# Patient Record
Sex: FEMALE | Race: WHITE | NOT HISPANIC OR LATINO | Employment: OTHER | URBAN - METROPOLITAN AREA
[De-identification: names, ages, dates, MRNs, and addresses within clinical notes are randomized per-mention and may not be internally consistent; named-entity substitution may affect disease eponyms.]

---

## 2017-01-23 DIAGNOSIS — M19.90 OSTEOARTHRITIS, UNSPECIFIED OSTEOARTHRITIS TYPE, UNSPECIFIED SITE: ICD-10-CM

## 2017-01-23 DIAGNOSIS — F32.A DEPRESSION, UNSPECIFIED DEPRESSION TYPE: ICD-10-CM

## 2017-01-23 DIAGNOSIS — K21.9 GASTROESOPHAGEAL REFLUX DISEASE, ESOPHAGITIS PRESENCE NOT SPECIFIED: ICD-10-CM

## 2017-01-23 RX ORDER — OMEPRAZOLE 40 MG/1
CAPSULE, DELAYED RELEASE ORAL
Qty: 90 CAP | Refills: 0 | Status: SHIPPED | OUTPATIENT
Start: 2017-01-23 | End: 2017-07-06 | Stop reason: SDUPTHER

## 2017-01-23 RX ORDER — ETODOLAC 400 MG/1
TABLET, EXTENDED RELEASE ORAL
Qty: 270 TAB | Refills: 0 | Status: SHIPPED | OUTPATIENT
Start: 2017-01-23 | End: 2017-07-06 | Stop reason: SDUPTHER

## 2017-01-23 RX ORDER — DULOXETIN HYDROCHLORIDE 60 MG/1
60 CAPSULE, DELAYED RELEASE ORAL 2 TIMES DAILY
Qty: 180 CAP | Refills: 0 | Status: SHIPPED | OUTPATIENT
Start: 2017-01-23 | End: 2018-09-05

## 2017-01-23 NOTE — TELEPHONE ENCOUNTER
Was the patient seen in the last year in this department? Yes 08/12/2016    Does patient have an active prescription for medications requested? No     Received Request Via: Patient

## 2017-01-24 NOTE — TELEPHONE ENCOUNTER
Was the patient seen in the last year in this department? Yes     Does patient have an active prescription for medications requested? Yes     Received Request Via: Patient      Pt met protocol?: Yes    LAST OV 08/12/16

## 2017-03-14 ENCOUNTER — OFFICE VISIT (OUTPATIENT)
Dept: MEDICAL GROUP | Facility: PHYSICIAN GROUP | Age: 62
End: 2017-03-14
Payer: COMMERCIAL

## 2017-03-14 VITALS
TEMPERATURE: 98.1 F | SYSTOLIC BLOOD PRESSURE: 120 MMHG | WEIGHT: 168 LBS | HEIGHT: 63 IN | OXYGEN SATURATION: 97 % | HEART RATE: 79 BPM | DIASTOLIC BLOOD PRESSURE: 76 MMHG | BODY MASS INDEX: 29.77 KG/M2 | RESPIRATION RATE: 12 BRPM

## 2017-03-14 DIAGNOSIS — E66.3 OVERWEIGHT (BMI 25.0-29.9): ICD-10-CM

## 2017-03-14 DIAGNOSIS — J02.9 VIRAL PHARYNGITIS: Primary | ICD-10-CM

## 2017-03-14 DIAGNOSIS — E55.9 VITAMIN D DEFICIENCY: ICD-10-CM

## 2017-03-14 DIAGNOSIS — E78.2 MIXED HYPERLIPIDEMIA: ICD-10-CM

## 2017-03-14 DIAGNOSIS — K64.4 INFLAMED EXTERNAL HEMORRHOID: ICD-10-CM

## 2017-03-14 PROCEDURE — 99214 OFFICE O/P EST MOD 30 MIN: CPT | Performed by: NURSE PRACTITIONER

## 2017-03-14 RX ORDER — HYDROCORTISONE ACETATE 25 MG/1
25 SUPPOSITORY RECTAL EVERY 12 HOURS
Qty: 12 SUPPOSITORY | Refills: 0 | Status: SHIPPED | OUTPATIENT
Start: 2017-03-14 | End: 2017-08-31

## 2017-03-14 ASSESSMENT — PATIENT HEALTH QUESTIONNAIRE - PHQ9: CLINICAL INTERPRETATION OF PHQ2 SCORE: 0

## 2017-03-14 NOTE — PROGRESS NOTES
Subjective:      Shawn Westbrook is a 61 y.o. female who presents with Pharyngitis            Pharyngitis     Shawn is here today to discuss the following new problem.  She is a patient of Dr. Crane, this is her first visit with myself.    1. Viral pharyngitis  Patient reports 2-3 days of sore throat.  Denies any significant nasal congestion.  She denies fever, chills or cough.  She was concerned about the possibility of strep throat as she was recently at a neighbor's house, who had just recently been diagnosed with strep throat.  She has been taking over-the-counter cough syrup and using saline rinse with mild to moderate temporary relief.    2. Mixed hyperlipidemia  Patient states that she lost her previous lab orders and is requesting a reorder.  She needs the paper as she gets them done at [facility].  She has personal history of elevated cholesterol values, she is not taking any over-the-counter prescription medications for management.  She reports making significant changes to her diet and has been exercising consistently for the past several months.  She is optimistic her cholesterol has improved with these changes.    3. Vitamin D deficiency    4. Inflamed external hemorrhoid  Patient reports having an external hemorrhoid that is quite inflamed.  She had a bout of constipation last week lasting a couple of days.  She reports discomfort with sitting and walking.  She has not tried any over-the-counter remedies since the onset.  Denies any bleeding.    5. Overweight (BMI 25.0-29.9)    Active Ambulatory Problems     Diagnosis Date Noted   • MVP (mitral valve prolapse)    • Chronic low back pain    • Chronic neck pain 05/30/2014   • Postmenopausal symptoms 05/30/2014   • Rosacea 05/30/2014   • Heel spur 05/30/2014   • GERD (gastroesophageal reflux disease) 05/30/2014   • Allergic rhinitis 05/30/2014   • Abscess, jaw 11/26/2015   • Depression 07/05/2016   • Osteoarthritis 07/05/2016   • Mixed hyperlipidemia  "03/14/2017     Resolved Ambulatory Problems     Diagnosis Date Noted   • No Resolved Ambulatory Problems     Past Medical History   Diagnosis Date   • Pericarditis 2010       Review of Systems   HENT:        See HPI          Objective:     /76 mmHg  Pulse 79  Temp(Src) 36.7 °C (98.1 °F)  Resp 12  Ht 1.6 m (5' 2.99\")  Wt 76.204 kg (168 lb)  BMI 29.77 kg/m2  SpO2 97%     Physical Exam   Constitutional: She appears well-developed and well-nourished.   HENT:   Right Ear: Tympanic membrane is injected.   Left Ear: Tympanic membrane is injected.   Mouth/Throat: Posterior oropharyngeal erythema present. No oropharyngeal exudate or posterior oropharyngeal edema.   Eyes: Conjunctivae and EOM are normal. Pupils are equal, round, and reactive to light.   Neck: Normal range of motion. Neck supple.   Cardiovascular: Normal rate.    Pulmonary/Chest: Effort normal and breath sounds normal.   Lymphadenopathy:     She has no cervical adenopathy.   Skin: Skin is warm and dry.   Psychiatric: She has a normal mood and affect. Her behavior is normal.   Nursing note and vitals reviewed.              Assessment/Plan:     1. Viral pharyngitis     2. Mixed hyperlipidemia  LIPID PROFILE    COMP METABOLIC PANEL   3. Vitamin D deficiency  VITAMIN D,25 HYDROXY   4. Inflamed external hemorrhoid  hydrocortisone (ANUSOL-HC) 25 MG Suppos   5. Overweight (BMI 25.0-29.9)  LIPID PROFILE    COMP METABOLIC PANEL       1.  Discussed over-the-counter remedies to help symptoms while the virus runs its course.  2.  Reorder labs, orders given  3.  Trial of suppository, discussed purpose and administration.  4.  Return to clinic after labs, sooner if the above fail to improve.      "

## 2017-03-14 NOTE — MR AVS SNAPSHOT
"        Shawn Westbrook   3/14/2017 8:20 AM   Office Visit   MRN: [id number]    Department:  [facility]   Dept Phone:  [phone]    Description:  Female : 1955   Provider:  LEXUS Hobson           Reason for Visit     Pharyngitis x 3 days      Allergies as of 3/14/2017     No Known Allergies      You were diagnosed with     Viral pharyngitis   [391684]  -  Primary     Mixed hyperlipidemia   [272.2.ICD-9-CM]       Vitamin D deficiency   [0574736]       Inflamed external hemorrhoid   [151814]       Overweight (BMI 25.0-29.9)   [910016]         Vital Signs     Blood Pressure Pulse Temperature Respirations Height Weight    120/76 mmHg 79 36.7 °C (98.1 °F) 12 1.6 m (5' 2.99\") 76.204 kg (168 lb)    Body Mass Index Oxygen Saturation Smoking Status             29.77 kg/m2 97% Former Smoker         Basic Information     Date Of Birth Sex Race Ethnicity Preferred Language    1955 Female White Non- English      Problem List              ICD-10-CM Priority Class Noted - Resolved    MVP (mitral valve prolapse) I34.1   Unknown - Present    Chronic low back pain M54.5, G89.29   Unknown - Present    Chronic neck pain M54.2, G89.29   2014 - Present    Postmenopausal symptoms N95.9   2014 - Present    Rosacea L71.9   2014 - Present    Heel spur M77.30   2014 - Present    GERD (gastroesophageal reflux disease) K21.9   2014 - Present    Allergic rhinitis J30.9   2014 - Present    Abscess, jaw M27.2   2015 - Present    Depression F32.9   2016 - Present    Osteoarthritis M19.90   2016 - Present    Mixed hyperlipidemia E78.2   3/14/2017 - Present      Health Maintenance        Date Due Completion Dates    IMM DTaP/Tdap/Td Vaccine (1 - Tdap) 1973 ---    IMM ZOSTER VACCINE 2014 ---    IMM INFLUENZA (1) 2016    MAMMOGRAM 2017, 2014    PAP SMEAR 2017    COLONOSCOPY 2020 (Prv " Comp)    Override on 5/30/2010: Previously completed            Current Immunizations     Influenza TIV (IM) 9/30/2015      Below and/or attached are the medications your provider expects you to take. Review all of your home medications and newly ordered medications with your provider and/or pharmacist. Follow medication instructions as directed by your provider and/or pharmacist. Please keep your medication list with you and share with your provider. Update the information when medications are discontinued, doses are changed, or new medications (including over-the-counter products) are added; and carry medication information at all times in the event of emergency situations     Allergies:  No Known Allergies          Medications  Valid as of: March 14, 2017 -  8:50 AM    Generic Name Brand Name Tablet Size Instructions for use    Clindamycin HCl (Cap) CLEOCIN 300 MG Take 300 mg by mouth 3 times a day.        Cyclobenzaprine HCl (Tab) FLEXERIL 10 MG Take 10 mg by mouth 2 times a day as needed for Muscle Spasms.        CycloSPORINE (Emulsion) RESTASIS 0.05 % Place 1 Drop in both eyes 2 times a day.        DULoxetine HCl (Cap DR Particles) CYMBALTA 60 MG Take 1 Cap by mouth 2 times a day.        Estradiol (Tab) ESTRACE 1 MG Take 1 Tab by mouth every 48 hours.        Etodolac (TABLET SR 24 HR) LODINE  MG Take 1 tablet by mouth 3  times a day        Fluticasone Propionate (Suspension) FLONASE 50 MCG/ACT Spray 1 Spray in nose 1 time daily as needed. Indications: Hayfever        Hydrocortisone Acetate (Suppos) ANUSOL-HC 25 MG Insert 1 Suppository in rectum every 12 hours.        Loteprednol Etabonate (Suspension) Loteprednol Etabonate 0.2 % Place 1 Drop in both eyes 2 times a day as needed. Indications: Seasonal Allergic Conjunctivitis        MetroNIDAZOLE (Gel) METROGEL 0.75 % Apply 1 Application to affected area(s) 2 times a day.        Omeprazole (CAPSULE DELAYED RELEASE) PRILOSEC 40 MG Take 1 capsule by mouth   every day        PredniSONE (Tab) DELTASONE 10 MG 40mg (4 tabs) oral daily for 4 days then  20mg (2 tabs) oral daily for 4 days then  10mg (1 tab) oral daily for 4 days        Temazepam (Cap) RESTORIL 30 MG Take 1 Cap by mouth at bedtime as needed for Sleep.        .                 Medicines prescribed today were sent to:     [id number] - [place], [place] - [address]    [address] [place] [address]    Phone: [phone] Fax: [phone]    Open 24 Hours?: No    [id number] - [place], [place] - [address]    [address] [place] [address]    Phone: [phone] Fax: [phone]    Open 24 Hours?: No      Medication refill instructions:       If your prescription bottle indicates you have medication refills left, it is not necessary to call your provider’s office. Please contact your pharmacy and they will refill your medication.    If your prescription bottle indicates you do not have any refills left, you may request refills at any time through one of the following ways: The online [employer] system (except Urgent Care), by calling your provider’s office, or by asking your pharmacy to contact your provider’s office with a refill request. Medication refills are processed only during regular business hours and may not be available until the next business day. Your provider may request additional information or to have a follow-up visit with you prior to refilling your medication.   *Please Note: Medication refills are assigned a new Rx number when refilled electronically. Your pharmacy may indicate that no refills were authorized even though a new prescription for the same medication is available at the pharmacy. Please request the medicine by name with the pharmacy before contacting your provider for a refill.        Your To Do List     Future Labs/Procedures Complete By Expires    COMP METABOLIC PANEL  As directed 3/14/2018    LIPID PROFILE  As directed 3/14/2018       VITAMIN D,25 HYDROXY  As directed 3/14/2018         [employer] Access Code: Activation code not generated  Current Tink Status: Active

## 2017-04-03 ENCOUNTER — TELEPHONE (OUTPATIENT)
Dept: MEDICAL GROUP | Facility: PHYSICIAN GROUP | Age: 62
End: 2017-04-03

## 2017-04-03 DIAGNOSIS — R60.9 PERIPHERAL EDEMA: ICD-10-CM

## 2017-04-03 NOTE — TELEPHONE ENCOUNTER
1. Caller Name: Shawn Westbrook                                           Call Back Number: [phone] (home)         Patient approves a detailed voicemail message: N\A    Pt states she has discussed her leg swelling at appointments and has been asked if she wanted to try a diuretic.  She states she is ready now to try something.  She also states she will be flying in May and is asking for a note to airline stating she needs an end seat so she can get up and walk around.  Thank you

## 2017-04-05 RX ORDER — FUROSEMIDE 20 MG/1
20 TABLET ORAL
Qty: 30 TAB | Refills: 5 | Status: SHIPPED | OUTPATIENT
Start: 2017-04-05 | End: 2017-08-31

## 2017-04-11 ENCOUNTER — TELEPHONE (OUTPATIENT)
Dept: MEDICAL GROUP | Facility: PHYSICIAN GROUP | Age: 62
End: 2017-04-11

## 2017-04-11 NOTE — TELEPHONE ENCOUNTER
1. Caller Name: Shawn Westbrook                                           Call Back Number: [phone]      Patient approves a detailed voicemail message: No    Pt had a mini face lift in [place] and has appoint Mon with Siobhan for stitch removal.  She is asking since procedure was elective and she paid out of pocket will she be responsible for payment for stitch removal here.  Please only call pt on her cell phone regarding this encounter.  [phone]

## 2017-04-11 NOTE — TELEPHONE ENCOUNTER
Most likely, Yes she would be responsible for cost of stitch removal.   She could call her insurance to find out what her out of pocket costs would be.

## 2017-04-17 ENCOUNTER — OFFICE VISIT (OUTPATIENT)
Dept: MEDICAL GROUP | Facility: PHYSICIAN GROUP | Age: 62
End: 2017-04-17
Payer: COMMERCIAL

## 2017-04-17 VITALS
HEART RATE: 78 BPM | WEIGHT: 159 LBS | BODY MASS INDEX: 29.26 KG/M2 | RESPIRATION RATE: 12 BRPM | TEMPERATURE: 99.5 F | SYSTOLIC BLOOD PRESSURE: 110 MMHG | OXYGEN SATURATION: 98 % | HEIGHT: 62 IN | DIASTOLIC BLOOD PRESSURE: 80 MMHG

## 2017-04-17 DIAGNOSIS — T81.31XA DEHISCENCE OF SURGICAL WOUND, INITIAL ENCOUNTER: Primary | ICD-10-CM

## 2017-04-17 DIAGNOSIS — Z98.890 S/P COSMETIC PLASTIC SURGERY: ICD-10-CM

## 2017-04-17 PROCEDURE — 99214 OFFICE O/P EST MOD 30 MIN: CPT | Performed by: NURSE PRACTITIONER

## 2017-04-17 NOTE — MR AVS SNAPSHOT
"        Shawn Westbrook   2017 10:40 AM   Office Visit   MRN: [id number]    Department:  [facility]   Dept Phone:  [phone]    Description:  Female : 1955   Provider:  LEXUS Hobson           Reason for Visit     Suture / Staple Removal           Allergies as of 2017     No Known Allergies      Vital Signs     Blood Pressure Pulse Temperature Respirations Height Weight    110/80 mmHg 78 37.5 °C (99.5 °F) 12 1.575 m (5' 2\") 72.122 kg (159 lb)    Body Mass Index Oxygen Saturation Smoking Status             29.07 kg/m2 98% Former Smoker         Basic Information     Date Of Birth Sex Race Ethnicity Preferred Language    1955 Female White Non- English      Problem List              ICD-10-CM Priority Class Noted - Resolved    MVP (mitral valve prolapse) I34.1   Unknown - Present    Chronic low back pain M54.5, G89.29   Unknown - Present    Chronic neck pain M54.2, G89.29   2014 - Present    Postmenopausal symptoms N95.9   2014 - Present    Rosacea L71.9   2014 - Present    Heel spur M77.30   2014 - Present    GERD (gastroesophageal reflux disease) K21.9   2014 - Present    Allergic rhinitis J30.9   2014 - Present    Abscess, jaw M27.2   2015 - Present    Depression F32.9   2016 - Present    Osteoarthritis M19.90   2016 - Present    Mixed hyperlipidemia E78.2   3/14/2017 - Present      Health Maintenance        Date Due Completion Dates    IMM DTaP/Tdap/Td Vaccine (1 - Tdap) 1973 ---    IMM ZOSTER VACCINE 2014 ---    MAMMOGRAM 2017, 2014    PAP SMEAR 2017    COLONOSCOPY 2020 (Prv Comp)    Override on 2010: Previously completed            Current Immunizations     Influenza TIV (IM) 2015      Below and/or attached are the medications your provider expects you to take. Review all of your home medications and newly ordered medications with your provider " and/or pharmacist. Follow medication instructions as directed by your provider and/or pharmacist. Please keep your medication list with you and share with your provider. Update the information when medications are discontinued, doses are changed, or new medications (including over-the-counter products) are added; and carry medication information at all times in the event of emergency situations     Allergies:  No Known Allergies          Medications  Valid as of: April 17, 2017 -  3:43 PM    Generic Name Brand Name Tablet Size Instructions for use    Clindamycin HCl (Cap) CLEOCIN 300 MG Take 300 mg by mouth 3 times a day.        Cyclobenzaprine HCl (Tab) FLEXERIL 10 MG Take 10 mg by mouth 2 times a day as needed for Muscle Spasms.        CycloSPORINE (Emulsion) RESTASIS 0.05 % Place 1 Drop in both eyes 2 times a day.        DULoxetine HCl (Cap DR Particles) CYMBALTA 60 MG Take 1 Cap by mouth 2 times a day.        Estradiol (Tab) ESTRACE 1 MG Take 1 Tab by mouth every 48 hours.        Etodolac (TABLET SR 24 HR) LODINE  MG Take 1 tablet by mouth 3  times a day        Fluticasone Propionate (Suspension) FLONASE 50 MCG/ACT Spray 1 Spray in nose 1 time daily as needed. Indications: Hayfever        Furosemide (Tab) LASIX 20 MG Take 1 Tab by mouth 1 time daily as needed (leg swelling).        Hydrocortisone Acetate (Suppos) ANUSOL-HC 25 MG Insert 1 Suppository in rectum every 12 hours.        Loteprednol Etabonate (Suspension) Loteprednol Etabonate 0.2 % Place 1 Drop in both eyes 2 times a day as needed. Indications: Seasonal Allergic Conjunctivitis        MetroNIDAZOLE (Gel) METROGEL 0.75 % Apply 1 Application to affected area(s) 2 times a day.        Omeprazole (CAPSULE DELAYED RELEASE) PRILOSEC 40 MG Take 1 capsule by mouth  every day        PredniSONE (Tab) DELTASONE 10 MG 40mg (4 tabs) oral daily for 4 days then  20mg (2 tabs) oral daily for 4 days then  10mg (1 tab) oral daily for 4 days        Temazepam (Cap)  RESTORIL 30 MG Take 1 Cap by mouth at bedtime as needed for Sleep.        .                 Medicines prescribed today were sent to:     [id number] - [place], [place] - [address]    [address] [address]    Phone: [phone] Fax: [phone]    Open 24 Hours?: No    [facility] [id number]  [place], [place] - [address]    [address] [address]    Phone: [phone] Fax: [phone]    Open 24 Hours?: No      Medication refill instructions:       If your prescription bottle indicates you have medication refills left, it is not necessary to call your provider’s office. Please contact your pharmacy and they will refill your medication.    If your prescription bottle indicates you do not have any refills left, you may request refills at any time through one of the following ways: The online [employer] system (except Urgent Care), by calling your provider’s office, or by asking your pharmacy to contact your provider’s office with a refill request. Medication refills are processed only during regular business hours and may not be available until the next business day. Your provider may request additional information or to have a follow-up visit with you prior to refilling your medication.   *Please Note: Medication refills are assigned a new Rx number when refilled electronically. Your pharmacy may indicate that no refills were authorized even though a new prescription for the same medication is available at the pharmacy. Please request the medicine by name with the pharmacy before contacting your provider for a refill.           [facility] Access Code: Activation code not generated  Current [facility] Status: Active

## 2017-04-17 NOTE — PROGRESS NOTES
"Chief Complaint   Patient presents with   • Suture / Staple Removal       HISTORY OF PRESENT ILLNESS: Patient is a 61 y.o. female established patient who presents today to discuss the followin. Dehiscence of surgical wound, initial encounter  Patient is here today to have sutures removed after undergoing a facelift procedure and Sally one week ago.  She states that the surgeon felt comfortable with \"a medical professional\" removing the sutures.  She does not have an appointment to follow-up with the surgeon.  She also mentions that she was not put under anesthesia for the procedure, rather given sedative and local pain medication to perform the procedure.  Today is her first day back into town and she was told to have the sutures removed today.  She reports some swelling and tenderness on the right side of her neck, she has been applying an ice pack.  She denies any fevers or chills although she has a low-grade temperature today.  She states that the bruising is resolving.  She denies any significant pain, she is no longer using pain medication.    2. S/P cosmetic plastic surgery    Allergies:Review of patient's allergies indicates no known allergies.    Current Outpatient Prescriptions Ordered in [facility]   Medication Sig Dispense Refill   • furosemide (LASIX) 20 MG Tab Take 1 Tab by mouth 1 time daily as needed (leg swelling). 30 Tab 5   • hydrocortisone (ANUSOL-HC) 25 MG Suppos Insert 1 Suppository in rectum every 12 hours. 12 Suppository 0   • duloxetine (CYMBALTA) 60 MG Cap DR Particles delayed-release capsule Take 1 Cap by mouth 2 times a day. 180 Cap 0   • etodolac (LODINE XL) 400 MG SR tablet Take 1 tablet by mouth 3  times a day 270 Tab 0   • omeprazole (PRILOSEC) 40 MG delayed-release capsule Take 1 capsule by mouth  every day 90 Cap 0   • cyclosporin (RESTASIS) 0.05 % ophthalmic emulsion Place 1 Drop in both eyes 2 times a day.     • metronidazole (METROGEL) 0.75 % gel Apply 1 Application to " affected area(s) 2 times a day. 1 Tube 3   • clindamycin (CLEOCIN) 300 MG Cap Take 300 mg by mouth 3 times a day.     • estradiol (ESTRACE) 1 MG Tab Take 1 Tab by mouth every 48 hours. 45 Tab 3   • Loteprednol Etabonate 0.2 % Suspension Place 1 Drop in both eyes 2 times a day as needed. Indications: Seasonal Allergic Conjunctivitis     • temazepam (RESTORIL) 30 MG capsule Take 1 Cap by mouth at bedtime as needed for Sleep. 90 Cap 0   • fluticasone (FLONASE) 50 MCG/ACT nasal spray Spray 1 Spray in nose 1 time daily as needed. Indications: Hayfever 16 g 5   • predniSONE (DELTASONE) 10 MG Tab 40mg (4 tabs) oral daily for 4 days then  20mg (2 tabs) oral daily for 4 days then  10mg (1 tab) oral daily for 4 days (Patient not taking: Reported on 3/26/2016) 30 Tab 0   • cyclobenzaprine (FLEXERIL) 10 MG Tab Take 10 mg by mouth 2 times a day as needed for Muscle Spasms.       No current Epic-ordered facility-administered medications on file.       Past Medical History   Diagnosis Date   • Pericarditis    • MVP (mitral valve prolapse)    • Chronic low back pain        Social History   Substance Use Topics   • Smoking status: Former Smoker     Quit date: 1988   • Smokeless tobacco: Never Used   • Alcohol Use: 0.0 oz/week     0 Standard drinks or equivalent per week      Comment: rare       Family Status   Relation Status Death Age   • Mother  76   • Father Alive    • Sister Alive    • Sister Alive      Family History   Problem Relation Age of Onset   • Alcohol/Drug Mother    • Heart Disease Mother    • Diabetes Sister    • Diabetes Sister        ROS: see above    Review of Systems   Constitutional: Negative for fever, chills, weight loss and malaise/fatigue.   HENT: Negative for ear pain, nosebleeds, congestion, sore throat and neck pain.    Eyes: Negative for blurred vision.   Respiratory: Negative for cough, sputum production, shortness of breath and wheezing.    Cardiovascular: Negative for chest pain,  "palpitations, orthopnea and leg swelling.   Gastrointestinal: Negative for heartburn, nausea, vomiting and abdominal pain.   Genitourinary: Negative for dysuria, urgency and frequency.   Musculoskeletal: Negative for myalgias, back pain and joint pain.   Skin: Negative for rash and itching.   Neurological: Negative for dizziness, tingling, tremors, sensory change, focal weakness and headaches.   Endo/Heme/Allergies: Does not bruise/bleed easily.   Psychiatric/Behavioral: Negative for depression, suicidal ideas and memory loss.  The patient is not nervous/anxious and does not have insomnia.        Exam:  Blood pressure 110/80, pulse 78, temperature 37.5 °C (99.5 °F), resp. rate 12, height 1.575 m (5' 2\"), weight 72.122 kg (159 lb), SpO2 98 %.  General:  Well nourished, well developed female in NAD  Head is grossly normal.  Suture lines below chin, along the front and back of both ears up into the hairline.  There is right sided cervical adenopathy with tenderness and subtle warmth.  Neck: Thyroid is not enlarged.  Pulmonary: Normal effort.   Cardiovascular: Regular rate.   Extremities: no clubbing, cyanosis, or edema.  Psych:  Mood and affect are normal.  Answering questions appropriately with good eye contact.    Sutures were removed without difficulty along the right anterior ear/tragus and up into the hairline.  There is wound dehiscence on the posterior right ear, with bleeding.  No further sutures were removed to give in the wound separation and bleeding.  There was also concern in regards to possible infection as there was a right sided adenopathy with low-grade temperature.    Please note that this dictation was created using voice recognition software. I have made every reasonable attempt to correct obvious errors, but I expect that there are errors of grammar and possibly content that I did not discover before finalizing the note.    Assessment/Plan:    1. Dehiscence of surgical wound, initial encounter   " "  2. S/P cosmetic plastic surgery          1.  The left-sided sutures were not removed.  Patient was advised to go to the emergency room or go back to the surgeon's office for further evaluation as I did not feel comfortable removing any further sutures given the status of the surgical wound and possibility of infection.  Dr. Arteaga's office was contacted locally and they did not feel comfortable assessing the situation as he was not the initial surgeon.  2.  Patient returned to the nurses station after phoning the surgeon's office stating that he was not concerned about infection and would \"walk me through removing the remainder of the sutures.\"  Once again patient was advised to be seen at his office or in the ER for further evaluation as this was not appropriate to be removing further sutures with wound dehiscence and the possibility of infection.    "

## 2017-05-02 RX ORDER — ESTRADIOL 1 MG/1
TABLET ORAL
Qty: 45 TAB | Refills: 1 | Status: SHIPPED | OUTPATIENT
Start: 2017-05-02 | End: 2018-06-17 | Stop reason: SDUPTHER

## 2017-05-03 ENCOUNTER — TELEPHONE (OUTPATIENT)
Dept: MEDICAL GROUP | Facility: PHYSICIAN GROUP | Age: 62
End: 2017-05-03

## 2017-05-03 DIAGNOSIS — N64.4 BREAST PAIN, LEFT: ICD-10-CM

## 2017-05-03 NOTE — TELEPHONE ENCOUNTER
1. Caller Name: Shawn Westbrook                                           Call Back Number: [phone] (home)         Patient approves a detailed voicemail message: N\A    Pt states she needs an order for a dx mammogram.  She is having pain in left breast

## 2017-06-08 ENCOUNTER — HOSPITAL ENCOUNTER (OUTPATIENT)
Dept: RADIOLOGY | Facility: MEDICAL CENTER | Age: 62
End: 2017-06-08
Attending: FAMILY MEDICINE
Payer: COMMERCIAL

## 2017-06-08 DIAGNOSIS — Z12.31 ENCOUNTER FOR MAMMOGRAM TO ESTABLISH BASELINE MAMMOGRAM: ICD-10-CM

## 2017-06-08 PROCEDURE — G0202 SCR MAMMO BI INCL CAD: HCPCS

## 2017-07-06 ENCOUNTER — TELEPHONE (OUTPATIENT)
Dept: MEDICAL GROUP | Facility: PHYSICIAN GROUP | Age: 62
End: 2017-07-06

## 2017-07-06 DIAGNOSIS — M25.562 CHRONIC PAIN OF LEFT KNEE: ICD-10-CM

## 2017-07-06 DIAGNOSIS — G89.29 CHRONIC PAIN OF LEFT KNEE: ICD-10-CM

## 2017-07-06 RX ORDER — ETODOLAC 400 MG/1
TABLET, EXTENDED RELEASE ORAL
Qty: 270 TAB | Refills: 0 | Status: SHIPPED | OUTPATIENT
Start: 2017-07-06 | End: 2017-12-04 | Stop reason: SDUPTHER

## 2017-07-06 RX ORDER — OMEPRAZOLE 40 MG/1
CAPSULE, DELAYED RELEASE ORAL
Qty: 90 CAP | Refills: 1 | Status: SHIPPED | OUTPATIENT
Start: 2017-07-06 | End: 2017-08-31

## 2017-07-06 NOTE — TELEPHONE ENCOUNTER
1. Caller Name: Shawn Westbrook                                           Call Back Number: [phone] (home)         Patient approves a detailed voicemail message: N\A    2. SPECIFIC Action To Be Taken: Referral pending, please sign.    3. Diagnosis/Clinical Reason for Request: left knee pain (pt states she is supposed to have knee surgery)    4. Specialty & Provider Name/Lab/Imaging Location: [facility]    5. Is appointment scheduled for requested order/referral: no    Patient informed they will receive a return phone call from the office ONLY if there are any questions before processing their request. Advised to call back if they haven't received a call from the referral department in 5 days.

## 2017-08-23 ENCOUNTER — TELEPHONE (OUTPATIENT)
Dept: MEDICAL GROUP | Facility: PHYSICIAN GROUP | Age: 62
End: 2017-08-23

## 2017-08-23 NOTE — TELEPHONE ENCOUNTER
Patient has not seen Dr. Crane since 8/2016.  Last seen for wound dehiscence.  Please schedule appt with Dr. Crane for pre-operative clearance.  Thank you

## 2017-08-23 NOTE — TELEPHONE ENCOUNTER
1. Caller Name: Shawn Westbrook                                           Call Back Number: [phone] (home)         Patient approves a detailed voicemail message: N\A    Pt states she is going to have left knee replacement with Dr Cobb at [facility] and needs surgical clearance faxed to [phone]  Atten: Velma

## 2017-08-31 ENCOUNTER — OFFICE VISIT (OUTPATIENT)
Dept: MEDICAL GROUP | Facility: PHYSICIAN GROUP | Age: 62
End: 2017-08-31
Payer: COMMERCIAL

## 2017-08-31 VITALS
RESPIRATION RATE: 14 BRPM | DIASTOLIC BLOOD PRESSURE: 68 MMHG | OXYGEN SATURATION: 95 % | HEART RATE: 86 BPM | TEMPERATURE: 98.6 F | SYSTOLIC BLOOD PRESSURE: 108 MMHG | HEIGHT: 62 IN | BODY MASS INDEX: 31.83 KG/M2 | WEIGHT: 173 LBS

## 2017-08-31 DIAGNOSIS — M17.12 PRIMARY OSTEOARTHRITIS OF LEFT KNEE: ICD-10-CM

## 2017-08-31 DIAGNOSIS — I34.1 MVP (MITRAL VALVE PROLAPSE): ICD-10-CM

## 2017-08-31 DIAGNOSIS — Z23 NEED FOR TETANUS BOOSTER: ICD-10-CM

## 2017-08-31 DIAGNOSIS — Z01.818 PREOPERATIVE CLEARANCE: ICD-10-CM

## 2017-08-31 DIAGNOSIS — E78.2 MIXED HYPERLIPIDEMIA: ICD-10-CM

## 2017-08-31 PROCEDURE — 90715 TDAP VACCINE 7 YRS/> IM: CPT | Performed by: FAMILY MEDICINE

## 2017-08-31 PROCEDURE — 93000 ELECTROCARDIOGRAM COMPLETE: CPT | Performed by: FAMILY MEDICINE

## 2017-08-31 PROCEDURE — 90471 IMMUNIZATION ADMIN: CPT | Performed by: FAMILY MEDICINE

## 2017-08-31 PROCEDURE — 99214 OFFICE O/P EST MOD 30 MIN: CPT | Mod: 25 | Performed by: FAMILY MEDICINE

## 2017-08-31 RX ORDER — OMEPRAZOLE 40 MG/1
CAPSULE, DELAYED RELEASE ORAL
COMMUNITY
Start: 2017-08-21 | End: 2018-09-05

## 2017-08-31 NOTE — LETTER
August 31, 2017        RE: Shawn Westbrook      Dear Dr. Cobb,      I had the pleasure of seeing our mutual patient Shawn Westbrook.  As you probably recall she is set up to have a left total knee arthroplasty for severe OA.  She also has history of hyperlipidemia and MVP both of which are stable.  At this time there is no concern in proceeding with surgery or general anesthesia.  If you have any questions please do not hesitate to contact us.                          Sara Crane MD

## 2017-08-31 NOTE — PROGRESS NOTES
Chief Complaint   Patient presents with   • Other     surgery clearance; lft total knee replacement       HISTORY OF PRESENT ILLNESS: Patient is a 61 y.o. female new patient who presents today to discuss the following issues:    1. Primary osteoarthritis of left knee  2. Preoperative clearance  3. MVP (mitral valve prolapse)  4. Mixed hyperlipidemia    Shawn is here today for pre-operative surgical evaluation for LEFT total knee arthroplasty.  She reports that she has been in good health other than the knee pain.  She has hx of MVP and Hyperlipidemia which has been stable and asymptomatic.  Recent labs from 3/2017 has been reviewed and are acceptable.    She has no hx of MI or stroke and has never had any VTE events.  She has had the right knee replaced with typical recovery course.      Active Ambulatory Problems     Diagnosis Date Noted   • MVP (mitral valve prolapse)    • Chronic low back pain    • Chronic neck pain 05/30/2014   • Postmenopausal symptoms 05/30/2014   • Rosacea 05/30/2014   • Heel spur 05/30/2014   • GERD (gastroesophageal reflux disease) 05/30/2014   • Allergic rhinitis 05/30/2014   • Depression 07/05/2016   • Osteoarthritis 07/05/2016   • Mixed hyperlipidemia 03/14/2017     Resolved Ambulatory Problems     Diagnosis Date Noted   • Abscess, jaw 11/26/2015     Past Medical History:   Diagnosis Date   • Chronic low back pain    • MVP (mitral valve prolapse)    • Pericarditis 2010       Allergies:Review of patient's allergies indicates no known allergies.    Current Outpatient Prescriptions   Medication Sig Dispense Refill   • etodolac (LODINE XL) 400 MG SR tablet TAKE ONE TABLET BY MOUTH THREE TIMES DAILY 270 Tab 0   • duloxetine (CYMBALTA) 60 MG Cap DR Particles delayed-release capsule Take 1 Cap by mouth 2 times a day. 180 Cap 0   • temazepam (RESTORIL) 30 MG capsule Take 1 Cap by mouth at bedtime as needed for Sleep. 90 Cap 0   • fluticasone (FLONASE) 50 MCG/ACT nasal spray Spray 1 Spray in nose 1  time daily as needed. Indications: Hayfever 16 g 5   • cyclosporin (RESTASIS) 0.05 % ophthalmic emulsion Place 1 Drop in both eyes 2 times a day.     • metronidazole (METROGEL) 0.75 % gel Apply 1 Application to affected area(s) 2 times a day. 1 Tube 3   • clindamycin (CLEOCIN) 300 MG Cap Take 300 mg by mouth 3 times a day.     • cyclobenzaprine (FLEXERIL) 10 MG Tab Take 10 mg by mouth 2 times a day as needed for Muscle Spasms.     • Loteprednol Etabonate 0.2 % Suspension Place 1 Drop in both eyes 2 times a day as needed. Indications: Seasonal Allergic Conjunctivitis     • omeprazole (PRILOSEC) 40 MG delayed-release capsule      • estradiol (ESTRACE) 1 MG Tab TAKE 1 TABLET BY MOUTH EVERY 48 HOURS 45 Tab 1     No current facility-administered medications for this visit.        Social History   Substance Use Topics   • Smoking status: Former Smoker     Quit date: 1988   • Smokeless tobacco: Never Used   • Alcohol use 0.0 oz/week      Comment: rare       Family Status   Relation Status   • Mother  at age 76   • Father Alive   • Sister Alive   • Sister Alive     Family History   Problem Relation Age of Onset   • Alcohol/Drug Mother    • Heart Disease Mother    • Diabetes Sister    • Diabetes Sister      Health Maintenance Due   Topic Date Due   • IMM DTaP/Tdap/Td Vaccine (1 - Tdap) 1973   • IMM ZOSTER VACCINE  2014   • PAP SMEAR  2017   • IMM INFLUENZA (1) 2017       ROS:  Review of Systems   Constitutional: Negative for fever, chills, weight loss and malaise/fatigue.   HENT: Negative for ear pain, nosebleeds, congestion, sore throat and neck pain.    Eyes: Negative for blurred vision.   Respiratory: Negative for cough, sputum production, shortness of breath and wheezing.    Cardiovascular: Negative for chest pain, palpitations, orthopnea and leg swelling.   Gastrointestinal: Negative for heartburn, nausea, vomiting and abdominal pain.   Genitourinary: Negative for dysuria, urgency  "and frequency.   Musculoskeletal: Negative for myalgias, back pain and joint pain.   Skin: Negative for rash and itching.   Neurological: Negative for dizziness, tingling, tremors, sensory change, focal weakness and headaches.   Endo/Heme/Allergies: Does not bruise/bleed easily.   Psychiatric/Behavioral: Negative for depression, suicidal ideas and memory loss.  The patient is not nervous/anxious and does not have insomnia.      Exam:  Blood pressure 108/68, pulse 86, temperature 37 °C (98.6 °F), resp. rate 14, height 1.575 m (5' 2\"), weight 78.5 kg (173 lb), SpO2 95 %.  General:  Well nourished, well developed female in NAD  HEENT: Normocephalic and atraumatic. TMs clear bilaterally. Oropharynx is clear      without erythema and no tonsillar exudate. Nasal mucosa pink with minimal      Rhinorrhea.  EYES: EOMI.  Conjunctiva clear.    Neck: Supple without JVD or bruit. Thyroid is not enlarged.  Pulmonary: Clear to ausculation and percussion.  Normal effort. No rales, ronchi, or wheezing.  Cardiovascular: Regular rate and rhythm without murmur, no peripheral edema  Abdomen: positive bowel sounds.  Non tender, non distended, no hepatosplenomegaly.   MSK: normal ROM in upper and lower extremities bilaterally  Psych: appropriate affect and concentration, oriented to person and place  Neuro: no unilateral weakness in upper or lower extremities.  No gait disturbance    Please note that this dictation was created using voice recognition software. I have made every reasonable attempt to correct obvious errors, but I expect that there are errors of grammar and possibly content that I did not discover before finalizing the note.    Assessment/Plan:  1. Primary osteoarthritis of left knee  - EKG  Awaiting arthroplasty.   Encouraged weight reduction  - Patient identified as having weight management issue.  Appropriate orders and counseling given.    2. Preoperative clearance    - EKG  EKG Interpretation-HR is NSR, no ST/Tw " abnormalities.   - Patient identified as having weight management issue.  Appropriate orders and counseling given.    3. MVP (mitral valve prolapse)  Stable, asymptomatic     4. Mixed hyperlipidemia  Controlled.     Update Tdap today.    Return as needed post op

## 2017-12-01 DIAGNOSIS — F51.01 PRIMARY INSOMNIA: ICD-10-CM

## 2017-12-01 RX ORDER — TEMAZEPAM 30 MG/1
30 CAPSULE ORAL NIGHTLY PRN
Qty: 90 CAP | Refills: 0 | Status: SHIPPED
Start: 2017-12-01 | End: 2018-09-05

## 2017-12-01 NOTE — TELEPHONE ENCOUNTER
Was the patient seen in the last year in this department? Yes 08/31/17    Does patient have an active prescription for medications requested? No     Received Request Via: Patient

## 2017-12-06 RX ORDER — ETODOLAC 400 MG/1
TABLET, EXTENDED RELEASE ORAL
Qty: 270 TAB | Refills: 1 | Status: SHIPPED | OUTPATIENT
Start: 2017-12-06 | End: 2018-07-22 | Stop reason: SDUPTHER

## 2017-12-06 NOTE — TELEPHONE ENCOUNTER
Was the patient seen in the last year in this department? Yes     Does patient have an active prescription for medications requested? No     Received Request Via: Pharmacy      Pt met protocol?: Yes, OV 8/17

## 2018-02-06 ENCOUNTER — TELEPHONE (OUTPATIENT)
Dept: MEDICAL GROUP | Facility: PHYSICIAN GROUP | Age: 63
End: 2018-02-06

## 2018-02-06 DIAGNOSIS — L71.9 ROSACEA: ICD-10-CM

## 2018-02-06 RX ORDER — METRONIDAZOLE 7.5 MG/G
1 GEL TOPICAL 2 TIMES DAILY
Qty: 1 TUBE | Refills: 11 | Status: SHIPPED | OUTPATIENT
Start: 2018-02-06 | End: 2018-09-05

## 2018-02-06 NOTE — TELEPHONE ENCOUNTER
1. Caller Name: Shawn Westbrook                                           Call Back Number: [phone] (home)         Patient approves a detailed voicemail message: N\A    Pt states she needs refill on metronidazole 0.75% gel but states it is not working as well as it used to.  She is asking if there is a stronger strength.

## 2018-02-12 RX ORDER — OMEPRAZOLE 40 MG/1
CAPSULE, DELAYED RELEASE ORAL
Qty: 90 CAP | Refills: 1 | Status: SHIPPED | OUTPATIENT
Start: 2018-02-12 | End: 2018-09-05

## 2018-02-12 RX ORDER — OMEPRAZOLE 40 MG/1
CAPSULE, DELAYED RELEASE ORAL
Refills: 2 | OUTPATIENT
Start: 2018-02-12

## 2018-06-19 RX ORDER — ESTRADIOL 1 MG/1
TABLET ORAL
Qty: 45 TAB | Refills: 1 | Status: SHIPPED | OUTPATIENT
Start: 2018-06-19 | End: 2019-02-27 | Stop reason: SDUPTHER

## 2018-07-23 RX ORDER — ETODOLAC 400 MG/1
TABLET, EXTENDED RELEASE ORAL
Qty: 270 TAB | Refills: 0 | Status: SHIPPED | OUTPATIENT
Start: 2018-07-23 | End: 2018-11-13 | Stop reason: SDUPTHER

## 2018-07-23 NOTE — TELEPHONE ENCOUNTER
Was the patient seen in the last year in this department? Yes     Does patient have an active prescription for medications requested? No     Received Request Via: Pharmacy    Pt met protocol?: Yes     Last OV 08/2017

## 2018-09-05 ENCOUNTER — OFFICE VISIT (OUTPATIENT)
Dept: MEDICAL GROUP | Facility: PHYSICIAN GROUP | Age: 63
End: 2018-09-05
Payer: COMMERCIAL

## 2018-09-05 VITALS
WEIGHT: 162 LBS | HEIGHT: 63 IN | BODY MASS INDEX: 28.7 KG/M2 | SYSTOLIC BLOOD PRESSURE: 104 MMHG | DIASTOLIC BLOOD PRESSURE: 64 MMHG | OXYGEN SATURATION: 97 % | RESPIRATION RATE: 14 BRPM | TEMPERATURE: 97.4 F | HEART RATE: 70 BPM

## 2018-09-05 DIAGNOSIS — Z13.6 SCREENING FOR CARDIOVASCULAR CONDITION: ICD-10-CM

## 2018-09-05 DIAGNOSIS — Z01.419 WELL WOMAN EXAM WITH ROUTINE GYNECOLOGICAL EXAM: ICD-10-CM

## 2018-09-05 DIAGNOSIS — Z12.39 SCREENING FOR BREAST CANCER: ICD-10-CM

## 2018-09-05 DIAGNOSIS — Z12.4 SCREENING FOR CERVICAL CANCER: ICD-10-CM

## 2018-09-05 PROCEDURE — 99396 PREV VISIT EST AGE 40-64: CPT | Performed by: FAMILY MEDICINE

## 2018-09-05 ASSESSMENT — PATIENT HEALTH QUESTIONNAIRE - PHQ9: CLINICAL INTERPRETATION OF PHQ2 SCORE: 0

## 2018-09-05 NOTE — PROGRESS NOTES
Chief Complaint   Patient presents with   • Gynecologic Exam       HISTORY OF PRESENT ILLNESS: Patient is a 62 y.o. female est patient who presents today to discuss the following issues:    Post-menopausal female here today for annual wellness visit with routine gyne exam.   She is s/p supracervical hysterectomy with benign pathology.  She denies any  symptoms.  In addition is due for mammography and routine labs.     Active Ambulatory Problems     Diagnosis Date Noted   • MVP (mitral valve prolapse)    • Chronic low back pain    • Chronic neck pain 2014   • Postmenopausal symptoms 2014   • Rosacea 2014   • Heel spur 2014   • GERD (gastroesophageal reflux disease) 2014   • Allergic rhinitis 2014   • Depression 2016   • Osteoarthritis 2016   • Mixed hyperlipidemia 2017     Resolved Ambulatory Problems     Diagnosis Date Noted   • Abscess, jaw 2015     Past Medical History:   Diagnosis Date   • Chronic low back pain    • MVP (mitral valve prolapse)    • Pericarditis        Allergies:Patient has no known allergies.    Current Outpatient Prescriptions   Medication Sig Dispense Refill   • etodolac (LODINE XL) 400 MG SR tablet TAKE ONE TABLET BY MOUTH THREE TIMES DAILY 270 Tab 0   • estradiol (ESTRACE) 1 MG Tab TAKE ONE TABLET BY MOUTH EVERY OTHER DAY (EVERY  48  HOURS) 45 Tab 1   • Loteprednol Etabonate 0.2 % Suspension Place 1 Drop in both eyes 2 times a day as needed. Indications: Seasonal Allergic Conjunctivitis       No current facility-administered medications for this visit.        Social History   Substance Use Topics   • Smoking status: Former Smoker     Quit date: 1988   • Smokeless tobacco: Never Used   • Alcohol use 0.0 oz/week      Comment: rare       Family Status   Relation Status   • Mo  at age 76   • Fa Alive   • Sis Alive   • Sis Alive     Family History   Problem Relation Age of Onset   • Alcohol/Drug Mother    • Heart  "Disease Mother    • Diabetes Sister    • Diabetes Sister      Health Maintenance Due   Topic Date Due   • PAP SMEAR  07/01/2017   • IMM ZOSTER VACCINES (2 of 3) 11/29/2017   • MAMMOGRAM  06/08/2018   • IMM INFLUENZA (1) 09/01/2018       ROS:  Review of Systems   Constitutional: Negative for fever, chills, weight loss and malaise/fatigue.   HENT: Negative for ear pain, nosebleeds, congestion, sore throat and neck pain.    Eyes: Negative for blurred vision.   Respiratory: Negative for cough, sputum production, shortness of breath and wheezing.    Cardiovascular: Negative for chest pain, palpitations, orthopnea and leg swelling.   Gastrointestinal: Negative for heartburn, nausea, vomiting and abdominal pain.   Genitourinary: Negative for dysuria, urgency and frequency.   Musculoskeletal: Negative for myalgias, back pain and joint pain.   Skin: Negative for rash and itching.   Neurological: Negative for dizziness, tingling, tremors, sensory change, focal weakness and headaches.   Endo/Heme/Allergies: Does not bruise/bleed easily.   Psychiatric/Behavioral: Negative for depression, suicidal ideas and memory loss.  The patient is not nervous/anxious and does not have insomnia.      Exam:  Blood pressure 104/64, pulse 70, temperature 36.3 °C (97.4 °F), resp. rate 14, height 1.594 m (5' 2.75\"), weight 73.5 kg (162 lb), SpO2 97 %.  General:  Well nourished, well developed female in NAD  HEENT: Normocephalic and atraumatic. TMs clear bilaterally. Oropharynx is clear      without erythema and no tonsillar exudate. Nasal mucosa pink with minimal      Rhinorrhea.  EYES: EOMI.  Conjunctiva clear.    Neck: Supple without JVD or bruit. Thyroid is not enlarged.  Pulmonary: Clear to ausculation and percussion.  Normal effort. No rales, ronchi, or wheezing.  Cardiovascular: Regular rate and rhythm without murmur, no peripheral edema  Abdomen: positive bowel sounds.  Non tender, non distended, no hepatosplenomegaly.   MSK: normal ROM in " upper and lower extremities bilaterally  Psych: appropriate affect and concentration, oriented to person and place  Neuro: no unilateral weakness in upper or lower extremities.  No gait disturbance  Breasts: normal appearance, no skin changes, no masses, nipple discharge, or LAD  External genitalia without lesions  Vaginal mucosa pink and smooth  Minimal cervical discharge  Pap obtained      Please note that this dictation was created using voice recognition software. I have made every reasonable attempt to correct obvious errors, but I expect that there are errors of grammar and possibly content that I did not discover before finalizing the note.    Assessment/Plan:  1. Screening for breast cancer  - MA-SCREEN MAMMO W/CAD-BILAT; Future    2. Well woman exam with routine gynecological exam  - MA-SCREEN MAMMO W/CAD-BILAT; Future  - THINPREP PAP WITH HPV; Future    3. Screening for cervical cancer  - THINPREP PAP WITH HPV; Future    4. Screening for cardiovascular condition  - COMP METABOLIC PANEL; Future  - LIPID PROFILE; Future      No Follow-up on file.

## 2018-09-14 ENCOUNTER — TELEPHONE (OUTPATIENT)
Dept: MEDICAL GROUP | Facility: PHYSICIAN GROUP | Age: 63
End: 2018-09-14

## 2019-03-14 DIAGNOSIS — Z01.419 WELL WOMAN EXAM WITH ROUTINE GYNECOLOGICAL EXAM: ICD-10-CM

## 2019-03-14 DIAGNOSIS — Z12.39 SCREENING FOR BREAST CANCER: ICD-10-CM

## 2019-03-26 RX ORDER — OMEPRAZOLE 40 MG/1
CAPSULE, DELAYED RELEASE ORAL
Qty: 90 CAP | Refills: 1 | Status: SHIPPED | OUTPATIENT
Start: 2019-03-26 | End: 2020-02-06 | Stop reason: SDUPTHER

## 2019-03-26 RX ORDER — ESTRADIOL 1 MG/1
TABLET ORAL
Qty: 90 TAB | Refills: 1 | Status: SHIPPED | OUTPATIENT
Start: 2019-03-26 | End: 2020-02-06 | Stop reason: SDUPTHER

## 2019-03-26 RX ORDER — ETODOLAC 400 MG/1
TABLET, EXTENDED RELEASE ORAL
Qty: 270 TAB | Refills: 1 | Status: SHIPPED | OUTPATIENT
Start: 2019-03-26 | End: 2019-11-26 | Stop reason: SDUPTHER

## 2019-03-26 NOTE — TELEPHONE ENCOUNTER
Was the patient seen in the last year in this department? Yes    Does patient have an active prescription for medications requested? No     Received Request Via: Pharmacy      Pt met protocol?: Yes, OV 9/18, refills due next month, pt reminded to schedule appt prior to additional refills, has not done so.

## 2019-11-27 RX ORDER — ETODOLAC 400 MG/1
TABLET, EXTENDED RELEASE ORAL
Qty: 90 TAB | Refills: 0 | Status: SHIPPED | OUTPATIENT
Start: 2019-11-27 | End: 2020-02-06

## 2019-11-27 NOTE — TELEPHONE ENCOUNTER
Pt was told 10/19 to make appt and that has not been done. Please advise pt only 30 days will be sent to pharmacy and next request will be denied.

## 2020-01-09 ENCOUNTER — PATIENT MESSAGE (OUTPATIENT)
Dept: MEDICAL GROUP | Facility: PHYSICIAN GROUP | Age: 65
End: 2020-01-09

## 2020-01-09 DIAGNOSIS — M81.0 POSTMENOPAUSAL BONE LOSS: ICD-10-CM

## 2020-01-14 ENCOUNTER — TELEPHONE (OUTPATIENT)
Dept: MEDICAL GROUP | Facility: PHYSICIAN GROUP | Age: 65
End: 2020-01-14

## 2020-01-14 NOTE — TELEPHONE ENCOUNTER
Patient called and stated that she received a letter from [employer].  She said that it stated that they needed more information from us by the 14th of January.  If they did not receive it, then it would be denied.  I advised that we already had sent over the paperwork.  I asked what the dates were on the letter that [employer] sent her, she stated that she could not find the letter.  Advised that I would fax over the paperwork again.

## 2020-02-06 ENCOUNTER — OFFICE VISIT (OUTPATIENT)
Dept: MEDICAL GROUP | Facility: PHYSICIAN GROUP | Age: 65
End: 2020-02-06
Payer: MEDICARE

## 2020-02-06 VITALS
TEMPERATURE: 99.2 F | WEIGHT: 166 LBS | OXYGEN SATURATION: 96 % | SYSTOLIC BLOOD PRESSURE: 104 MMHG | HEART RATE: 68 BPM | RESPIRATION RATE: 14 BRPM | DIASTOLIC BLOOD PRESSURE: 68 MMHG | HEIGHT: 62 IN | BODY MASS INDEX: 30.55 KG/M2

## 2020-02-06 DIAGNOSIS — Z23 NEED FOR VACCINATION: ICD-10-CM

## 2020-02-06 DIAGNOSIS — M19.90 OSTEOARTHRITIS, UNSPECIFIED OSTEOARTHRITIS TYPE, UNSPECIFIED SITE: ICD-10-CM

## 2020-02-06 DIAGNOSIS — E78.5 MILD HYPERLIPIDEMIA: ICD-10-CM

## 2020-02-06 DIAGNOSIS — L71.9 ROSACEA: ICD-10-CM

## 2020-02-06 DIAGNOSIS — Z12.39 SCREENING FOR BREAST CANCER: ICD-10-CM

## 2020-02-06 DIAGNOSIS — N95.9 POSTMENOPAUSAL SYMPTOMS: ICD-10-CM

## 2020-02-06 DIAGNOSIS — H04.129 DRY EYE: ICD-10-CM

## 2020-02-06 DIAGNOSIS — Z12.31 ENCOUNTER FOR SCREENING MAMMOGRAM FOR MALIGNANT NEOPLASM OF BREAST: ICD-10-CM

## 2020-02-06 PROCEDURE — G0009 ADMIN PNEUMOCOCCAL VACCINE: HCPCS | Performed by: FAMILY MEDICINE

## 2020-02-06 PROCEDURE — 90670 PCV13 VACCINE IM: CPT | Performed by: FAMILY MEDICINE

## 2020-02-06 PROCEDURE — 99214 OFFICE O/P EST MOD 30 MIN: CPT | Mod: 25 | Performed by: FAMILY MEDICINE

## 2020-02-06 RX ORDER — MELOXICAM 7.5 MG/1
7.5-15 TABLET ORAL DAILY
Qty: 180 TAB | Refills: 3 | Status: SHIPPED | OUTPATIENT
Start: 2020-02-06 | End: 2020-06-23 | Stop reason: SINTOL

## 2020-02-06 RX ORDER — ESTRADIOL 1 MG/1
TABLET ORAL
Qty: 90 TAB | Refills: 3 | Status: SHIPPED | OUTPATIENT
Start: 2020-02-06 | End: 2022-03-07 | Stop reason: SDUPTHER

## 2020-02-06 RX ORDER — OMEPRAZOLE 40 MG/1
CAPSULE, DELAYED RELEASE ORAL
Qty: 90 CAP | Refills: 3 | Status: SHIPPED | OUTPATIENT
Start: 2020-02-06 | End: 2020-10-13

## 2020-02-06 RX ORDER — METRONIDAZOLE 7.5 MG/G
GEL TOPICAL
Qty: 1 TUBE | Refills: 1 | Status: SHIPPED | OUTPATIENT
Start: 2020-02-06 | End: 2020-08-04

## 2020-02-06 RX ORDER — METHYLPREDNISOLONE 4 MG/1
TABLET ORAL
COMMUNITY
Start: 2020-01-16 | End: 2020-05-19 | Stop reason: SDUPTHER

## 2020-02-06 RX ORDER — CYCLOSPORINE 0.5 MG/ML
1 EMULSION OPHTHALMIC 2 TIMES DAILY
Qty: 90 EACH | Refills: 3 | Status: SHIPPED | OUTPATIENT
Start: 2020-02-06 | End: 2020-02-18 | Stop reason: SDUPTHER

## 2020-02-06 ASSESSMENT — PATIENT HEALTH QUESTIONNAIRE - PHQ9
6. FEELING BAD ABOUT YOURSELF - OR THAT YOU ARE A FAILURE OR HAVE LET YOURSELF OR YOUR FAMILY DOWN: NOT AL ALL
3. TROUBLE FALLING OR STAYING ASLEEP OR SLEEPING TOO MUCH: NOT AT ALL
1. LITTLE INTEREST OR PLEASURE IN DOING THINGS: NOT AT ALL
7. TROUBLE CONCENTRATING ON THINGS, SUCH AS READING THE NEWSPAPER OR WATCHING TELEVISION: NOT AT ALL
4. FEELING TIRED OR HAVING LITTLE ENERGY: NOT AT ALL
SUM OF ALL RESPONSES TO PHQ9 QUESTIONS 1 AND 2: 0
2. FEELING DOWN, DEPRESSED, IRRITABLE, OR HOPELESS: NOT AT ALL
5. POOR APPETITE OR OVEREATING: NOT AT ALL
8. MOVING OR SPEAKING SO SLOWLY THAT OTHER PEOPLE COULD HAVE NOTICED. OR THE OPPOSITE, BEING SO FIGETY OR RESTLESS THAT YOU HAVE BEEN MOVING AROUND A LOT MORE THAN USUAL: NOT AT ALL
9. THOUGHTS THAT YOU WOULD BE BETTER OFF DEAD, OR OF HURTING YOURSELF: NOT AT ALL
SUM OF ALL RESPONSES TO PHQ QUESTIONS 1-9: 0

## 2020-02-06 NOTE — PROGRESS NOTES
Chief Complaint   Patient presents with   • Requesting Labs       HISTORY OF PRESENT ILLNESS: Patient is a 64 y.o. female established patient here today for the following concerns:     Osteoarthritis, unspecified osteoarthritis type, unspecified site  She has a history of arthritis and has been managing her pain with antiinflammatories which has been helping. She is here wanting to discuss additional NSAIDs to manage her pain.    Postmenopausal symptoms  She has recurrent symptoms of hot flashes occurring during the night while sleeping on her estradiol. She wants to discuss dosing adjustments to better control this. She is due for her bone density scan and mammogram.     Dry eye  She has a history of this and is well controlled on Restasis. She is requesting a refill of this medication.    Rosacea  She has a history of rosacea and is well controlled on metronidazole. She uses it approximately 3-4 times per day with good results.     Mild hyperlipidemia  She will continue to manage this through her own efforts. She notes that she has not been closely watching her diet and has been eating red meat.       Past Medical, Social, and Family history reviewed and updated in EPIC    Allergies:Patient has no known allergies.    Current Outpatient Medications   Medication Sig Dispense Refill   • meloxicam (MOBIC) 7.5 MG Tab Take 1-2 Tabs by mouth every day. 180 Tab 3   • estradiol (ESTRACE) 1 MG Tab TAKE 1 TABLET BY MOUTH EVERY DAY 90 Tab 3   • omeprazole (PRILOSEC) 40 MG delayed-release capsule TAKE 1 CAPSULE BY MOUTH ONCE DAILY 90 Cap 3   • cyclosporin (RESTASIS) 0.05 % ophthalmic emulsion Place 1 Drop in both eyes 2 times a day. Individua vials 90 Each 3   • metronidazole (METROGEL) 0.75 % gel Apply a thin layer topically to affected area twice daily 1 Tube 1   • Loteprednol Etabonate 0.2 % Suspension Place 1 Drop in both eyes 2 times a day as needed. Indications: Seasonal Allergic Conjunctivitis       No current  "facility-administered medications for this visit.          ROS:  Review of Systems   Constitutional: Negative for fever, chills, weight loss and malaise/fatigue.   HENT: Negative for ear pain, nosebleeds, congestion, sore throat and neck pain.    Eyes: Negative for blurred vision.   Respiratory: Negative for cough, sputum production, shortness of breath and wheezing.    Cardiovascular: Negative for chest pain, palpitations,  and leg swelling.   Gastrointestinal: Negative for heartburn, nausea, vomiting, diarrhea and abdominal pain.   Genitourinary: Negative for dysuria, urgency and frequency.   Musculoskeletal: General arthritic pain, Negative for myalgias, back pain   Skin: Negative for rash and itching.   Neurological: Negative for dizziness, tingling, tremors, sensory change, focal weakness and headaches.   Endo/Heme/Allergies: Does not bruise/bleed easily.   Psychiatric/Behavioral: Negative for depression, anxiety, suicidal ideas, insomnia and memory loss.      Exam:  /68   Pulse 68   Temp 37.3 °C (99.2 °F)   Resp 14   Ht 1.581 m (5' 2.25\")   Wt 75.3 kg (166 lb)   SpO2 96%     General:  Well nourished, well developed in NAD  Head is grossly normal.  Neck: Supple without JVD   Pulmonary: Clear to ausculation and percussion.  Normal effort. No rales, ronchi, or wheezing.  Cardiovascular: Regular rate and rhythm without murmur  Extremities: no clubbing, cyanosis, or edema.  Psych: affect appropriate    Please note that this dictation was created using voice recognition software. I have made every reasonable attempt to correct obvious errors, but I expect that there are errors of grammar and possibly content that I did not discover before finalizing the note.    Assessment/Plan:    1. Screening for breast cancer  Patient is due. Screening lab work ordered.   - MA-SCREENING MAMMO BILAT W/TOMOSYNTHESIS W/CAD; Future    2. Need for vaccination  Vaccine was administered today without adverse event.   - " Pneumococcal Conjugate Vaccine 13-Valent IM    3. Osteoarthritis, unspecified osteoarthritis type, unspecified site  Known chronic history. Patient will start on meloxicam for better pain control as requested.  - meloxicam (MOBIC) 7.5 MG Tab; Take 1-2 Tabs by mouth every day.  Dispense: 180 Tab; Refill: 3    4. Postmenopausal symptoms  Patient has new symptoms of hot flashes occurring nightly. Will adjust her dosage to prevent this.  - estradiol (ESTRACE) 1 MG Tab; TAKE 1 TABLET BY MOUTH EVERY DAY  Dispense: 90 Tab; Refill: 3    5. Rosacea  Under good control. Continue same regimen with metrogel. Refill provided.  - metronidazole (METROGEL) 0.75 % gel; Apply a thin layer topically to affected area twice daily  Dispense: 1 Tube; Refill: 1    6. Dry eye  Under good control. Continue same regimen with Restatsis. Refill provided.  - cyclosporin (RESTASIS) 0.05 % ophthalmic emulsion; Place 1 Drop in both eyes 2 times a day. Individua vials  Dispense: 90 Each; Refill: 3    7. Encounter for screening mammogram for malignant neoplasm of breast   Patient is due. Screening lab work ordered.   - MA-SCREENING MAMMO BILAT W/TOMOSYNTHESIS W/CAD; Future    8. Mild hyperlipidemia  Her lipid panel from 3/25/2019 show an elevated cholesterol at 224 and an LDL at 129. She will continue to manage this through her own efforts. I have advised the patient to increase her exercise regimen and avoid fatty foods. Labs have been ordered for the next follow up visit.     - Comp Metabolic Panel; Future  - Lipid Profile; Future       I, Jeffrey Ricci (Ketan), am scribing for, and in the presence of, Sara Crane M.D.    Electronically signed by: Jeffrey Ricci (Ketan), 2/6/2020     Sara BANUELOS M.D. personally performed the services described in this documentation, as scribed by Jeffrey Ricci in my presence, and it is both accurate and complete.

## 2020-02-18 DIAGNOSIS — H04.129 DRY EYE: ICD-10-CM

## 2020-02-18 RX ORDER — CYCLOSPORINE 0.5 MG/ML
1 EMULSION OPHTHALMIC 2 TIMES DAILY
Qty: 180 EACH | Refills: 3 | Status: SHIPPED | OUTPATIENT
Start: 2020-02-18 | End: 2022-03-07 | Stop reason: SDUPTHER

## 2020-02-18 NOTE — TELEPHONE ENCOUNTER
[id number] - [place], [place] - [address] [address]  Phone: [phone] Fax: [phone]    Pharmacy received rx for Restasis that was for 45 days.  They are requesting a 90 day rx

## 2020-03-10 DIAGNOSIS — M81.0 POSTMENOPAUSAL BONE LOSS: ICD-10-CM

## 2020-05-13 ENCOUNTER — TELEPHONE (OUTPATIENT)
Dept: MEDICAL GROUP | Facility: PHYSICIAN GROUP | Age: 65
End: 2020-05-13

## 2020-05-13 ENCOUNTER — HOSPITAL ENCOUNTER (OUTPATIENT)
Dept: LAB | Facility: MEDICAL CENTER | Age: 65
End: 2020-05-13
Attending: FAMILY MEDICINE
Payer: MEDICARE

## 2020-05-13 DIAGNOSIS — E78.5 MILD HYPERLIPIDEMIA: ICD-10-CM

## 2020-05-13 LAB
ALBUMIN SERPL BCP-MCNC: 4 G/DL (ref 3.2–4.9)
ALBUMIN/GLOB SERPL: 1.4 G/DL
ALP SERPL-CCNC: 70 U/L (ref 30–99)
ALT SERPL-CCNC: 9 U/L (ref 2–50)
ANION GAP SERPL CALC-SCNC: 7 MMOL/L (ref 7–16)
AST SERPL-CCNC: 14 U/L (ref 12–45)
BILIRUB SERPL-MCNC: 0.5 MG/DL (ref 0.1–1.5)
BUN SERPL-MCNC: 18 MG/DL (ref 8–22)
CALCIUM SERPL-MCNC: 9.5 MG/DL (ref 8.5–10.5)
CHLORIDE SERPL-SCNC: 100 MMOL/L (ref 96–112)
CHOLEST SERPL-MCNC: 234 MG/DL (ref 100–199)
CO2 SERPL-SCNC: 27 MMOL/L (ref 20–33)
CREAT SERPL-MCNC: 0.74 MG/DL (ref 0.5–1.4)
FASTING STATUS PATIENT QL REPORTED: NORMAL
GLOBULIN SER CALC-MCNC: 2.9 G/DL (ref 1.9–3.5)
GLUCOSE SERPL-MCNC: 85 MG/DL (ref 65–99)
HDLC SERPL-MCNC: 64 MG/DL
LDLC SERPL CALC-MCNC: 158 MG/DL
POTASSIUM SERPL-SCNC: 4 MMOL/L (ref 3.6–5.5)
PROT SERPL-MCNC: 6.9 G/DL (ref 6–8.2)
SODIUM SERPL-SCNC: 134 MMOL/L (ref 135–145)
TRIGL SERPL-MCNC: 62 MG/DL (ref 0–149)

## 2020-05-13 PROCEDURE — 36415 COLL VENOUS BLD VENIPUNCTURE: CPT

## 2020-05-13 PROCEDURE — 80061 LIPID PANEL: CPT

## 2020-05-13 PROCEDURE — 80053 COMPREHEN METABOLIC PANEL: CPT

## 2020-05-14 NOTE — TELEPHONE ENCOUNTER
1. Caller Name: Shawn     Call Back Number: [phone] (home)         How would the patient prefer to be contacted with a response: Phone call do NOT leave a detailed message    Informed pt of Lab results and schedule an appt for follow up

## 2020-05-14 NOTE — TELEPHONE ENCOUNTER
----- Message from Sara Crane M.D. sent at 5/13/2020  2:05 PM PDT -----  Please call patient to ensure they received their results through My Chart.  The patient requires follow up office visit.

## 2020-05-19 ENCOUNTER — TELEMEDICINE (OUTPATIENT)
Dept: MEDICAL GROUP | Facility: PHYSICIAN GROUP | Age: 65
End: 2020-05-19
Payer: MEDICARE

## 2020-05-19 VITALS
BODY MASS INDEX: 29.88 KG/M2 | WEIGHT: 162.4 LBS | HEIGHT: 62 IN | TEMPERATURE: 98 F | DIASTOLIC BLOOD PRESSURE: 73 MMHG | SYSTOLIC BLOOD PRESSURE: 101 MMHG

## 2020-05-19 DIAGNOSIS — M54.12 CERVICAL RADICULOPATHY: ICD-10-CM

## 2020-05-19 DIAGNOSIS — E78.2 MIXED HYPERLIPIDEMIA: ICD-10-CM

## 2020-05-19 DIAGNOSIS — I73.9 PERIPHERAL ARTERY DISEASE (HCC): ICD-10-CM

## 2020-05-19 PROCEDURE — 99214 OFFICE O/P EST MOD 30 MIN: CPT | Mod: 95,CR | Performed by: FAMILY MEDICINE

## 2020-05-19 RX ORDER — SIMVASTATIN 20 MG
20 TABLET ORAL EVERY EVENING
Qty: 90 TAB | Refills: 3 | Status: SHIPPED | OUTPATIENT
Start: 2020-05-19 | End: 2021-07-28 | Stop reason: SDUPTHER

## 2020-05-19 RX ORDER — METHYLPREDNISOLONE 4 MG/1
TABLET ORAL
Qty: 21 TAB | Refills: 0 | Status: SHIPPED | OUTPATIENT
Start: 2020-05-19 | End: 2020-06-04

## 2020-05-19 NOTE — PROGRESS NOTES
Chief Complaint   Patient presents with   • Hyperlipidemia     fv labs       HISTORY OF PRESENT ILLNESS: Patient is a 64 y.o. female established patient here today for the following concerns:    This encounter was conducted via Zoom .   Verbal consent was obtained. Patient's identity was verified.    1. Mixed hyperlipidemia  This is a pleasant 64-year-old female with history of persistent mild hyperlipidemia with no previous history of MI or stroke.  She has been working on diet and exercise but admits that lately she has been eating more meat and cheese than she typically would.  Denies any chest pain.    2. Peripheral artery disease (HCC)  She is very much concerned that she is noted loss of peripheral hair over her legs.  Initially thought that this might be nice however after reading an article in [employer] she is concerned that she may have some peripheral vascular disease.  She has not noted any significant claudication.  She does not have any cramping or color change in the feet.    3. Cervical radiculopathy  In addition she reports since January has been experiencing some right shoulder pain and over the last couple weeks is started to experience numbness into the arm down to the hand.  She found that when she was drinking a glass of water and extended the neck she experiences a worsening numbness.  Denies any weakness.  There is no leg weakness.  This is been disturbing her sleep        Past Medical, Social, and Family history reviewed and updated in EPIC    Allergies:Patient has no known allergies.    Current Outpatient Medications   Medication Sig Dispense Refill   • cyclosporin (RESTASIS) 0.05 % ophthalmic emulsion Place 1 Drop in both eyes 2 times a day. Individua vials 180 Each 3   • meloxicam (MOBIC) 7.5 MG Tab Take 1-2 Tabs by mouth every day. 180 Tab 3   • estradiol (ESTRACE) 1 MG Tab TAKE 1 TABLET BY MOUTH EVERY DAY 90 Tab 3   • omeprazole (PRILOSEC) 40 MG delayed-release capsule TAKE 1 CAPSULE BY MOUTH  "ONCE DAILY 90 Cap 3   • metronidazole (METROGEL) 0.75 % gel Apply a thin layer topically to affected area twice daily 1 Tube 1   • Loteprednol Etabonate 0.2 % Suspension Place 1 Drop in both eyes 2 times a day as needed. Indications: Seasonal Allergic Conjunctivitis       No current facility-administered medications for this visit.          ROS:  Review of Systems   Constitutional: Negative for fever, chills, weight loss and malaise/fatigue.   HENT: Negative for ear pain, nosebleeds, congestion, sore throat and positive neck pain.    Eyes: Negative for blurred vision.   Respiratory: Negative for cough, sputum production, shortness of breath and wheezing.    Cardiovascular: Negative for chest pain, palpitations,  and leg swelling.   Gastrointestinal: Negative for heartburn, nausea, vomiting, diarrhea and abdominal pain.   Genitourinary: Negative for dysuria, urgency and frequency.   Musculoskeletal: Negative for myalgias, back pain and joint pain.   Skin: Negative for rash and itching.   Neurological: Negative for dizziness, positive tingling, tremors, sensory change, focal weakness and headaches.   Endo/Heme/Allergies: Does not bruise/bleed easily.   Psychiatric/Behavioral: Negative for depression, anxiety, suicidal ideas, insomnia and memory loss.      Exam:  /73   Temp 36.7 °C (98 °F)   Ht 1.581 m (5' 2.25\")   Wt 73.7 kg (162 lb 6.4 oz)     General:  Well nourished, well developed in NAD  Head is grossly normal.  Neck: Supple without JVD, Trachea midline, no thyromegaly noted  Pulmonary:  Normal effort. Speaking in full sentences  Psych: affect appropriate  Skin: no Jaundice noted or facial rashes    Please note that this dictation was created using voice recognition software. I have made every reasonable attempt to correct obvious errors, but I expect that there are errors of grammar and possibly content that I did not discover before finalizing the note.    Assessment/Plan:  1. Mixed " hyperlipidemia  Start Simvastatin 20 mg daily   Recheck labs in 3months.   - Comp Metabolic Panel; Future  - Lipid Profile; Future    2. Peripheral artery disease (HCC)  Concerning for PAD, check   - US-EXTREMITY ARTERY LOWER BILAT W/EMI (COMBO); Future    3. Cervical radiculopathy  - REFERRAL TO PHYSIATRY (PMR)  - DX-CERVICAL SPINE-WITH FLEX-EXT   7+; Future  Trial of Medrol Dosepak to help with the cervical impingement    Follow-up in 3 months sooner as needed above findings

## 2020-05-20 ENCOUNTER — PATIENT MESSAGE (OUTPATIENT)
Dept: MEDICAL GROUP | Facility: PHYSICIAN GROUP | Age: 65
End: 2020-05-20

## 2020-05-25 ENCOUNTER — HOSPITAL ENCOUNTER (OUTPATIENT)
Dept: RADIOLOGY | Facility: MEDICAL CENTER | Age: 65
End: 2020-05-25
Attending: FAMILY MEDICINE
Payer: MEDICARE

## 2020-05-25 DIAGNOSIS — I73.9 PERIPHERAL ARTERY DISEASE (HCC): ICD-10-CM

## 2020-05-25 PROCEDURE — 93922 UPR/L XTREMITY ART 2 LEVELS: CPT

## 2020-05-25 PROCEDURE — 93922 UPR/L XTREMITY ART 2 LEVELS: CPT | Mod: 26 | Performed by: INTERNAL MEDICINE

## 2020-06-01 NOTE — PROGRESS NOTES
New patient note    Physiatry (physical medicine and  Rehabilitation), interventional spine and sports medicine    Date of service: See epic    Chief complaint:   Chief Complaint   Patient presents with   • New Patient     Neck pain        Referring provider: Sara Crane M.D.     HISTORY    HPI: Shawn Westbrook 64 y.o. female who presents today with Diagnoses of Cervical radiculopathy, Chronic neck pain, Right arm pain, Right arm numbness, Carpal tunnel syndrome of right wrist, and Claustrophobia were pertinent to this visit.    HPI    The patient is been having right-sided neck pain radiating down through the right arm with associated numbness.  Worse with neck movements.  Worse with neck extension.  5/10 at rest, gets up to 7/10 with neck movements.  functional deficits and she has been unable to do her home exercise program and weight training because of the pain. She has tried ice, NSAIDs, tylenol, medrol dosepak with no improvement.           Medical records review:  I reviewed the note from the referring provider Sara Crane M.D. including the note dated 5/19/2020.  X-ray ordered for cervical radiculopathy and patient was given a trial of Medrol Dosepak.  History of peripheral artery disease with ultrasound and EMI ordered.  Hyperlipidemia on simvastatin with labs ordered.      ROS:   Red Flags ROS:   Fever, Chills, Sweats: Denies  Involuntary Weight Loss: Denies  Bladder Incontinence: Denies  Bowel Incontinence: denies  Saddle Anesthesia: Denies    All other systems reviewed and negative.       PMHx:   Past Medical History:   Diagnosis Date   • Chronic low back pain    • MVP (mitral valve prolapse)    • Pericarditis 2010         Current Outpatient Medications on File Prior to Visit   Medication Sig Dispense Refill   • simvastatin (ZOCOR) 20 MG Tab Take 1 Tab by mouth every evening. 90 Tab 3   • cyclosporin (RESTASIS) 0.05 % ophthalmic emulsion Place 1 Drop in both eyes 2 times a day. Individua  vials 180 Each 3   • meloxicam (MOBIC) 7.5 MG Tab Take 1-2 Tabs by mouth every day. 180 Tab 3   • estradiol (ESTRACE) 1 MG Tab TAKE 1 TABLET BY MOUTH EVERY DAY 90 Tab 3   • omeprazole (PRILOSEC) 40 MG delayed-release capsule TAKE 1 CAPSULE BY MOUTH ONCE DAILY 90 Cap 3   • metronidazole (METROGEL) 0.75 % gel Apply a thin layer topically to affected area twice daily 1 Tube 1   • Loteprednol Etabonate 0.2 % Suspension Place 1 Drop in both eyes 2 times a day as needed. Indications: Seasonal Allergic Conjunctivitis       No current facility-administered medications on file prior to visit.         PSHx:   Past Surgical History:   Procedure Laterality Date   • ABDOMINAL HYSTERECTOMY TOTAL      cervix remains and one ovary.    • ARTHROPLASTY      knee, right   • LAMINOTOMY      L4-5, L5-S1 fusion   • OTHER ORTHOPEDIC SURGERY      spinal fusion   • OTHER ORTHOPEDIC SURGERY      right knee replacement       Family history   Family History   Problem Relation Age of Onset   • Alcohol/Drug Mother    • Heart Disease Mother    • Diabetes Sister    • Diabetes Sister          Medications: reviewed on epic.   Outpatient Medications Marked as Taking for the 6/4/20 encounter (Office Visit) with Marco Rios M.D.   Medication Sig Dispense Refill   • ALPRAZolam (XANAX) 1 MG Tab Take 1 Tab by mouth as needed for Anxiety (take one tab 1 hour prior to MRI. OK to repeat x 1. do not drive on this med) for up to 1 day. 2 Tab 0   • gabapentin (NEURONTIN) 100 MG Cap Take 1-3 Caps by mouth at bedtime as needed (pain). 90 Cap 3   • simvastatin (ZOCOR) 20 MG Tab Take 1 Tab by mouth every evening. 90 Tab 3   • cyclosporin (RESTASIS) 0.05 % ophthalmic emulsion Place 1 Drop in both eyes 2 times a day. Individua vials 180 Each 3   • meloxicam (MOBIC) 7.5 MG Tab Take 1-2 Tabs by mouth every day. 180 Tab 3   • estradiol (ESTRACE) 1 MG Tab TAKE 1 TABLET BY MOUTH EVERY DAY 90 Tab 3   • omeprazole (PRILOSEC) 40 MG delayed-release capsule TAKE 1 CAPSULE  BY MOUTH ONCE DAILY 90 Cap 3   • metronidazole (METROGEL) 0.75 % gel Apply a thin layer topically to affected area twice daily 1 Tube 1   • Loteprednol Etabonate 0.2 % Suspension Place 1 Drop in both eyes 2 times a day as needed. Indications: Seasonal Allergic Conjunctivitis          Allergies:   No Known Allergies    Social Hx:   Social History     Socioeconomic History   • Marital status:      Spouse name: Not on file   • Number of children: Not on file   • Years of education: Not on file   • Highest education level: Not on file   Occupational History     Employer: OTHER     Comment: retired   Social Needs   • Financial resource strain: Not on file   • Food insecurity     Worry: Not on file     Inability: Not on file   • Transportation needs     Medical: Not on file     Non-medical: Not on file   Tobacco Use   • Smoking status: Former Smoker     Last attempt to quit: 1988     Years since quittin.0   • Smokeless tobacco: Never Used   Substance and Sexual Activity   • Alcohol use: Yes     Alcohol/week: 0.0 oz     Comment: rare   • Drug use: No   • Sexual activity: Not Currently     Comment:    Lifestyle   • Physical activity     Days per week: Not on file     Minutes per session: Not on file   • Stress: Not on file   Relationships   • Social connections     Talks on phone: Not on file     Gets together: Not on file     Attends Islam service: Not on file     Active member of club or organization: Not on file     Attends meetings of clubs or organizations: Not on file     Relationship status: Not on file   • Intimate partner violence     Fear of current or ex partner: Not on file     Emotionally abused: Not on file     Physically abused: Not on file     Forced sexual activity: Not on file   Other Topics Concern   •  Service No   • Blood Transfusions No   • Caffeine Concern No   • Occupational Exposure No   • Hobby Hazards No   • Sleep Concern Yes     Comment: due to pain   • Stress  "Concern Yes   • Weight Concern No   • Special Diet No   • Back Care No   • Exercise Yes   • Bike Helmet No   • Seat Belt Yes   • Self-Exams Yes   Social History Narrative   • Not on file         EXAMINATION     Physical Exam:   Vitals: /72 (BP Location: Left arm, Patient Position: Sitting, BP Cuff Size: Adult)   Pulse 77   Temp 37.5 °C (99.5 °F) (Temporal)   Ht 1.6 m (5' 3\")   Wt 75.3 kg (166 lb 0.1 oz)   SpO2 96%     Constitutional:   Body Habitus: Body mass index is 29.41 kg/m².  Cooperation: Fully cooperates with exam  Appearance: Well-groomed, well-nourished, not disheveled     Eyes: No scleral icterus to suggest severe liver disease, no proptosis to suggest severe hyperthyroid    ENT -no obvious auditory deficits, no obvious tongue lesions, tongue midline, no facial droop     Skin -no rashes or lesions noted     Respiratory-  breathing comfortable on room air, no audible wheezing    Cardiovascular- capillary refills less than 2 seconds. No lower extremity edema is noted.     Gastrointestinal - no obvious abdominal masses, No tenderness to palpation in the abdomen    Psychiatric- alert and oriented ×3. Normal affect.     Gait - normal gait, no use of ambulatory device, nonantalgic.     Musculoskeletal and Neuro -     Bilateral hands:   Inspection: No swelling,  Deformities or rashes. Symmetric appearing thenar and hyperthenar regions bilaterally.  Palpation no significant tenderness to palpation throughout the bilateral hands  Range of motion is within normal limits throughout bilateral hands, fingers and wrist.  Special tests:  Carpal tunnel compression: Negative bilaterally  Phalen's test: Negative bilaterally  Finkelstein's test: Negative bilaterally     right Shoulder  Inspection: No rashes are noted on the bilateral shoulders. Humerus is not grossly high riding when comparing the right and left sides.  Palpation: No tenderness to palpation over the biceps tendon, acromioclavicular joint, scapular " spine, supraspinous, infraspinatus, greater tuberosity, lesser tuberosity, trapezius.  Range of motion: Active range of motion forward ark and lateral arc within normal limits. The patient is able to lower his arm slowly with no drop arm.  Special tests:  Neers: negative  Urbina: negative  Speeds: Negative  Jürgensen signs: Negative  Empty can: negative  O'Briens test: Negative  Crossarm test: Negative  Resisted internal rotation: Negative  Resisted external rotation: neagtive  Resisted elbow extension: Negative       Cervical spine   Inspection: No deformities of the skin over the cervical spine. No rashes or lesions.    decreased  active range of motion in all directions, with  pain      Spurling’s sign: positive right, negative left    No signs of muscular atrophy in bilateral upper extremities     Positive for tenderness to palpation on the RIGHT side in the cervical facets.       Key points for the international standards for neurological classification of spinal cord injury (ISNCSCI) to light touch.     Dermatome R L   C4 2 2   C5 2 2   C6 1 2   C7 1 2   C8 2 2   T1 2 2   T2 2 2                                     Motor Exam Upper Extremities   ? Myotome R L   Shoulder flexion C5 5 5   Elbow flexion C5 5 5   Wrist extension C6 5 5   Elbow extension C7 5 5   Finger flexion C8 5 5   Finger abduction T1 5 5     Reflexes  ?  R L   Biceps  2+ 2+   Brachioradialis  2+ 2+     Moralez’s sign negative bilaterally                MEDICAL DECISION MAKING    Medical records review: see under HPI section.     DATA    Labs:   Lab Results   Component Value Date/Time    SODIUM 134 (L) 05/13/2020 09:21 AM    POTASSIUM 4.0 05/13/2020 09:21 AM    CHLORIDE 100 05/13/2020 09:21 AM    CO2 27 05/13/2020 09:21 AM    ANION 7.0 05/13/2020 09:21 AM    GLUCOSE 85 05/13/2020 09:21 AM    BUN 18 05/13/2020 09:21 AM    CREATININE 0.74 05/13/2020 09:21 AM    CALCIUM 9.5 05/13/2020 09:21 AM    ASTSGOT 14 05/13/2020 09:21 AM    ALTSGPT 9  05/13/2020 09:21 AM    TBILIRUBIN 0.5 05/13/2020 09:21 AM    ALBUMIN 4.0 05/13/2020 09:21 AM    TOTPROTEIN 6.9 05/13/2020 09:21 AM    GLOBULIN 2.9 05/13/2020 09:21 AM    AGRATIO 1.4 05/13/2020 09:21 AM   ]    No results found for: PROTHROMBTM, INR     Lab Results   Component Value Date/Time    WBC 8.2 11/27/2015 03:39 AM    RBC 4.26 11/27/2015 03:39 AM    HEMOGLOBIN 12.8 11/27/2015 03:39 AM    HEMATOCRIT 40.1 11/27/2015 03:39 AM    MCV 94.1 11/27/2015 03:39 AM    MCH 30.0 11/27/2015 03:39 AM    MCHC 31.9 (L) 11/27/2015 03:39 AM    MPV 11.6 11/27/2015 03:39 AM    NEUTSPOLYS 82.80 (H) 11/27/2015 03:39 AM    LYMPHOCYTES 13.20 (L) 11/27/2015 03:39 AM    MONOCYTES 3.40 11/27/2015 03:39 AM    EOSINOPHILS 0.00 11/27/2015 03:39 AM    BASOPHILS 0.10 11/27/2015 03:39 AM        Lab Results   Component Value Date/Time    HBA1C 5.6 06/25/2014 11:27 AM        Imaging:   I personally reviewed following images, these are my reads  X-ray cervical spine 6/2/2020 with degenerative changes worst in the mid cervical spine at C3-4, C4-5, C5-6 with significant facet arthropathy.  No acute fractures.          IMAGING radiology reads. I reviewed the following radiology reads                                                                                           X-ray cervical spine 6/ 2/2020  1.  No compression deformity or acute fracture.     2.  There is degenerative disc disease and facet arthropathy with bilateral osseous neural foraminal narrowing.     3.  No focal instability is noted on flexion extension views.              I reviewed the imaging report from [facility] from 7/28/2011 from Dr. Abraham Matamoros.  There was noted to be multilevel posterior bulging and herniated disks with mild spinal cord impingement anteriorly with a left posterior lateral osteophyte complex at C3-4 where there was most prominent and this was mild.  No right-sided herniations or neuroforaminal stenosis were noted.  These images were not  available for my interpretation at this time.                                                                                               Diagnosis   Visit Diagnoses     ICD-10-CM   1. Cervical radiculopathy  M54.12   2. Chronic neck pain  M54.2    G89.29   3. Right arm pain  M79.601   4. Right arm numbness  R20.0   5. Carpal tunnel syndrome of right wrist  G56.01   6. Claustrophobia  F40.240           ASSESSMENT AND PLAN:  Shawn Westbrook 64 y.o. female      Shawn was seen today for new patient.    Diagnoses and all orders for this visit:    Cervical radiculopathy  -     MR-CERVICAL SPINE-W/O; Future  -     gabapentin (NEURONTIN) 100 MG Cap; Take 1-3 Caps by mouth at bedtime as needed (pain).    Chronic neck pain  -     MR-CERVICAL SPINE-W/O; Future  -     gabapentin (NEURONTIN) 100 MG Cap; Take 1-3 Caps by mouth at bedtime as needed (pain).    Right arm pain  -     MR-CERVICAL SPINE-W/O; Future  -     gabapentin (NEURONTIN) 100 MG Cap; Take 1-3 Caps by mouth at bedtime as needed (pain).    Right arm numbness  -     MR-CERVICAL SPINE-W/O; Future  -     gabapentin (NEURONTIN) 100 MG Cap; Take 1-3 Caps by mouth at bedtime as needed (pain).    Carpal tunnel syndrome of right wrist  -     gabapentin (NEURONTIN) 100 MG Cap; Take 1-3 Caps by mouth at bedtime as needed (pain).    Claustrophobia  -     ALPRAZolam (XANAX) 1 MG Tab; Take 1 Tab by mouth as needed for Anxiety (take one tab 1 hour prior to MRI. OK to repeat x 1. do not drive on this med) for up to 1 day.    #2 pills.         The patient does have positive diagnostic findings of carpal tunnel syndrome but this would not fully explain her symptoms.  I believe the majority of her pain is coming from a cervical radiculopathy superimposed on cervical facet mediated pain with the majority of her pain coming from her cervical radiculopathy likely on the right of the lower cervical spine.  There are paresthesias and pain of the C6 and C7 distribution.  There is no  weakness on exam.    PLAN  home exercise program: I provided the patient with a strengthening and stretching with a home exercise program     Diagnostic workup: 6/4/2020 I performed a limited diagnostic ultrasound of the RIGHT median nerve which shows a flattened morphology cross-sectional area of 18 mm² at the level of the carpal tunnel outlet and 7 mm² at the level of the pronator quadratus. This is consistent with carpal tunnel syndrome.  The images were uploaded to the medical record.      Medications: Gabapentin as above    I do not recommend oral steroids.  There have been several studies done show no efficacy and low back pain and oral steroids have significant side effects.  There was no pain relief in this large randomized controlled study.  The [place] medical journal did a population-based study with a significant increase in sepsis, venous thromboembolism, fractures with the use of oral steroids.  The functional changes were statistically significant but not clinically significant.  Also when there is a disc herniation and radiculopathy these areas typically have a poor blood supply so they respond better to epidurals and local steroids.    Https://[email or web address]/journals/jane/fullarticle/4888481  BMJ 2017; 357 doi: https://doi.org/10.1136/bmj.j1415 (Published 12 April 2017)      Interventional program: To be considered after the diagnostic studies      I prescribed a right neutral wrist plan to be worn nightly    Outside records requested:  The patient signed outside records request form for her outside records including outside images. This includes the records from Request images from [facility] including the MRI cervical spine.      Follow-up: After the above diagnostic studies           Please note that this dictation was created using voice recognition software. I have made every reasonable attempt to correct obvious errors but there may be errors of grammar and content that I may have  overlooked prior to finalization of this note.      Marco Rios MD  Physical Medicine and Rehabilitation  Interventional Spine and Sports Physiatry  [facility]            Sara Crane M.D.

## 2020-06-02 ENCOUNTER — HOSPITAL ENCOUNTER (OUTPATIENT)
Dept: RADIOLOGY | Facility: MEDICAL CENTER | Age: 65
End: 2020-06-02
Attending: FAMILY MEDICINE
Payer: MEDICARE

## 2020-06-02 ENCOUNTER — TELEPHONE (OUTPATIENT)
Dept: MEDICAL GROUP | Facility: PHYSICIAN GROUP | Age: 65
End: 2020-06-02

## 2020-06-02 DIAGNOSIS — M54.12 CERVICAL RADICULOPATHY: ICD-10-CM

## 2020-06-02 PROCEDURE — 72052 X-RAY EXAM NECK SPINE 6/>VWS: CPT

## 2020-06-02 NOTE — TELEPHONE ENCOUNTER
----- Message from Sara Crane M.D. sent at 6/2/2020 12:24 PM PDT -----  Lots of arthritis, degeneration, and neuro foraminal stenosis of the cervical spine.  Recommend physiatry consult.

## 2020-06-04 ENCOUNTER — OFFICE VISIT (OUTPATIENT)
Dept: PHYSICAL MEDICINE AND REHAB | Facility: MEDICAL CENTER | Age: 65
End: 2020-06-04
Payer: MEDICARE

## 2020-06-04 VITALS
TEMPERATURE: 99.5 F | BODY MASS INDEX: 29.41 KG/M2 | WEIGHT: 166.01 LBS | HEART RATE: 77 BPM | SYSTOLIC BLOOD PRESSURE: 110 MMHG | HEIGHT: 63 IN | DIASTOLIC BLOOD PRESSURE: 72 MMHG | OXYGEN SATURATION: 96 %

## 2020-06-04 DIAGNOSIS — M54.12 CERVICAL RADICULOPATHY: ICD-10-CM

## 2020-06-04 DIAGNOSIS — M54.2 CHRONIC NECK PAIN: ICD-10-CM

## 2020-06-04 DIAGNOSIS — F40.240 CLAUSTROPHOBIA: ICD-10-CM

## 2020-06-04 DIAGNOSIS — M79.601 RIGHT ARM PAIN: ICD-10-CM

## 2020-06-04 DIAGNOSIS — G56.01 CARPAL TUNNEL SYNDROME OF RIGHT WRIST: ICD-10-CM

## 2020-06-04 DIAGNOSIS — G89.29 CHRONIC NECK PAIN: ICD-10-CM

## 2020-06-04 DIAGNOSIS — R20.0 RIGHT ARM NUMBNESS: ICD-10-CM

## 2020-06-04 PROCEDURE — 99205 OFFICE O/P NEW HI 60 MIN: CPT | Mod: 25 | Performed by: PHYSICAL MEDICINE & REHABILITATION

## 2020-06-04 PROCEDURE — 76882 US LMTD JT/FCL EVL NVASC XTR: CPT | Mod: RT | Performed by: PHYSICAL MEDICINE & REHABILITATION

## 2020-06-04 RX ORDER — ALPRAZOLAM 1 MG/1
1 TABLET ORAL PRN
Qty: 2 TAB | Refills: 0 | Status: SHIPPED | OUTPATIENT
Start: 2020-06-04 | End: 2020-06-05

## 2020-06-04 RX ORDER — ALPRAZOLAM 1 MG/1
1 TABLET ORAL PRN
Qty: 2 TAB | Refills: 0 | Status: SHIPPED | OUTPATIENT
Start: 2020-06-04 | End: 2020-06-04

## 2020-06-04 RX ORDER — GABAPENTIN 100 MG/1
100-300 CAPSULE ORAL NIGHTLY PRN
Qty: 90 CAP | Refills: 3 | Status: SHIPPED | OUTPATIENT
Start: 2020-06-04 | End: 2020-08-12

## 2020-06-04 ASSESSMENT — PAIN SCALES - GENERAL: PAINLEVEL: 7=MODERATE-SEVERE PAIN

## 2020-06-04 ASSESSMENT — PATIENT HEALTH QUESTIONNAIRE - PHQ9: CLINICAL INTERPRETATION OF PHQ2 SCORE: 0

## 2020-06-17 ENCOUNTER — HOSPITAL ENCOUNTER (OUTPATIENT)
Dept: RADIOLOGY | Facility: MEDICAL CENTER | Age: 65
End: 2020-06-17
Attending: PHYSICAL MEDICINE & REHABILITATION
Payer: MEDICARE

## 2020-06-17 DIAGNOSIS — M54.12 CERVICAL RADICULOPATHY: ICD-10-CM

## 2020-06-17 DIAGNOSIS — M54.2 CHRONIC NECK PAIN: ICD-10-CM

## 2020-06-17 DIAGNOSIS — G89.29 CHRONIC NECK PAIN: ICD-10-CM

## 2020-06-17 DIAGNOSIS — M79.601 RIGHT ARM PAIN: ICD-10-CM

## 2020-06-17 DIAGNOSIS — R20.0 RIGHT ARM NUMBNESS: ICD-10-CM

## 2020-06-17 PROCEDURE — 72141 MRI NECK SPINE W/O DYE: CPT

## 2020-06-22 NOTE — TELEPHONE ENCOUNTER
----- Message from Shawn Westbrook sent at 6/21/2020  1:59 PM PDT -----  Regarding: Prescription Question  Contact: [phone]  I've been taking Meloxicam 7.5 MG. I don't like this medication. It doesn't work as well as the Etodolac did.  Can you please send in a prescription to [facility] for Etodolac 400 MG 3x daily.   Thank you  Shawn Westbrook

## 2020-06-23 RX ORDER — ETODOLAC 400 MG/1
400 TABLET, EXTENDED RELEASE ORAL 3 TIMES DAILY
Qty: 90 TAB | Refills: 0 | Status: SHIPPED | OUTPATIENT
Start: 2020-06-23 | End: 2020-07-21

## 2020-07-15 ENCOUNTER — OFFICE VISIT (OUTPATIENT)
Dept: PHYSICAL MEDICINE AND REHAB | Facility: MEDICAL CENTER | Age: 65
End: 2020-07-15
Payer: MEDICARE

## 2020-07-15 VITALS
SYSTOLIC BLOOD PRESSURE: 112 MMHG | BODY MASS INDEX: 29.65 KG/M2 | HEART RATE: 69 BPM | HEIGHT: 63 IN | DIASTOLIC BLOOD PRESSURE: 74 MMHG | WEIGHT: 167.33 LBS | TEMPERATURE: 99 F | OXYGEN SATURATION: 95 %

## 2020-07-15 DIAGNOSIS — M54.12 CERVICAL RADICULOPATHY: ICD-10-CM

## 2020-07-15 DIAGNOSIS — M54.2 CHRONIC NECK PAIN: ICD-10-CM

## 2020-07-15 DIAGNOSIS — M48.02 CERVICAL STENOSIS OF SPINAL CANAL: ICD-10-CM

## 2020-07-15 DIAGNOSIS — G89.29 CHRONIC NECK PAIN: ICD-10-CM

## 2020-07-15 DIAGNOSIS — R20.0 RIGHT ARM NUMBNESS: ICD-10-CM

## 2020-07-15 DIAGNOSIS — G56.01 CARPAL TUNNEL SYNDROME OF RIGHT WRIST: ICD-10-CM

## 2020-07-15 DIAGNOSIS — M79.601 RIGHT ARM PAIN: ICD-10-CM

## 2020-07-15 PROCEDURE — 99214 OFFICE O/P EST MOD 30 MIN: CPT | Performed by: PHYSICAL MEDICINE & REHABILITATION

## 2020-07-15 ASSESSMENT — PAIN SCALES - GENERAL: PAINLEVEL: 4=SLIGHT-MODERATE PAIN

## 2020-07-15 NOTE — PROGRESS NOTES
Follow up patient note  Interventional spine and sports physiatry, Physical medicine rehabilitation      Chief complaint:   Chief Complaint   Patient presents with   • Follow-Up     neck          HISTORY    Please see new patient note by Dr Rios,  for more details.     HPI  Patient identification: Shawn Westbrook 64 y.o. female  With Diagnoses of Cervical radiculopathy, Right arm numbness, Right arm pain, Chronic neck pain, Carpal tunnel syndrome of right wrist, and Cervical stenosis of spinal canal were pertinent to this visit.       -Still with pain radiating down from the neck to the right scapula and down the right arm with associated numbness 4-5/10 in intensity aching and burning in quality, constant, worse with neck movements.  Denies recent injuries.       ROS Red Flags :   Fever, Chills, Sweats: Denies  Involuntary Weight Loss: Denies  Bowel/Bladder Incontinence: Denies  Saddle Anesthesia: Denies        PMHx:   Past Medical History:   Diagnosis Date   • Chronic low back pain    • MVP (mitral valve prolapse)    • Pericarditis 2010       PSHx:   Past Surgical History:   Procedure Laterality Date   • ABDOMINAL HYSTERECTOMY TOTAL      cervix remains and one ovary.    • ARTHROPLASTY      knee, right   • LAMINOTOMY      L4-5, L5-S1 fusion   • OTHER ORTHOPEDIC SURGERY      spinal fusion   • OTHER ORTHOPEDIC SURGERY      right knee replacement       Family history   Family History   Problem Relation Age of Onset   • Alcohol/Drug Mother    • Heart Disease Mother    • Diabetes Sister    • Diabetes Sister          Medications:   Outpatient Medications Marked as Taking for the 7/15/20 encounter (Office Visit) with Marco Rios M.D.   Medication Sig Dispense Refill   • etodolac (LODINE XL) 400 MG SR tablet Take 1 Tab by mouth 3 times a day. 90 Tab 0   • gabapentin (NEURONTIN) 100 MG Cap Take 1-3 Caps by mouth at bedtime as needed (pain). 90 Cap 3   • simvastatin (ZOCOR) 20 MG Tab Take 1 Tab by mouth every  evening. 90 Tab 3   • cyclosporin (RESTASIS) 0.05 % ophthalmic emulsion Place 1 Drop in both eyes 2 times a day. Individua vials 180 Each 3   • estradiol (ESTRACE) 1 MG Tab TAKE 1 TABLET BY MOUTH EVERY DAY 90 Tab 3   • omeprazole (PRILOSEC) 40 MG delayed-release capsule TAKE 1 CAPSULE BY MOUTH ONCE DAILY 90 Cap 3   • metronidazole (METROGEL) 0.75 % gel Apply a thin layer topically to affected area twice daily 1 Tube 1   • Loteprednol Etabonate 0.2 % Suspension Place 1 Drop in both eyes 2 times a day as needed. Indications: Seasonal Allergic Conjunctivitis          Current Outpatient Medications on File Prior to Visit   Medication Sig Dispense Refill   • etodolac (LODINE XL) 400 MG SR tablet Take 1 Tab by mouth 3 times a day. 90 Tab 0   • gabapentin (NEURONTIN) 100 MG Cap Take 1-3 Caps by mouth at bedtime as needed (pain). 90 Cap 3   • simvastatin (ZOCOR) 20 MG Tab Take 1 Tab by mouth every evening. 90 Tab 3   • cyclosporin (RESTASIS) 0.05 % ophthalmic emulsion Place 1 Drop in both eyes 2 times a day. Individua vials 180 Each 3   • estradiol (ESTRACE) 1 MG Tab TAKE 1 TABLET BY MOUTH EVERY DAY 90 Tab 3   • omeprazole (PRILOSEC) 40 MG delayed-release capsule TAKE 1 CAPSULE BY MOUTH ONCE DAILY 90 Cap 3   • metronidazole (METROGEL) 0.75 % gel Apply a thin layer topically to affected area twice daily 1 Tube 1   • Loteprednol Etabonate 0.2 % Suspension Place 1 Drop in both eyes 2 times a day as needed. Indications: Seasonal Allergic Conjunctivitis       No current facility-administered medications on file prior to visit.          Allergies:   No Known Allergies    Social Hx:   Social History     Socioeconomic History   • Marital status:      Spouse name: Not on file   • Number of children: Not on file   • Years of education: Not on file   • Highest education level: Not on file   Occupational History     Employer: OTHER     Comment: retired   Social Needs   • Financial resource strain: Not on file   • Food insecurity  "    Worry: Not on file     Inability: Not on file   • Transportation needs     Medical: Not on file     Non-medical: Not on file   Tobacco Use   • Smoking status: Former Smoker     Last attempt to quit: 1988     Years since quittin.1   • Smokeless tobacco: Never Used   Substance and Sexual Activity   • Alcohol use: Yes     Alcohol/week: 0.0 oz     Comment: rare   • Drug use: No   • Sexual activity: Not Currently     Comment:    Lifestyle   • Physical activity     Days per week: Not on file     Minutes per session: Not on file   • Stress: Not on file   Relationships   • Social connections     Talks on phone: Not on file     Gets together: Not on file     Attends Christian service: Not on file     Active member of club or organization: Not on file     Attends meetings of clubs or organizations: Not on file     Relationship status: Not on file   • Intimate partner violence     Fear of current or ex partner: Not on file     Emotionally abused: Not on file     Physically abused: Not on file     Forced sexual activity: Not on file   Other Topics Concern   •  Service No   • Blood Transfusions No   • Caffeine Concern No   • Occupational Exposure No   • Hobby Hazards No   • Sleep Concern Yes     Comment: due to pain   • Stress Concern Yes   • Weight Concern No   • Special Diet No   • Back Care No   • Exercise Yes   • Bike Helmet No   • Seat Belt Yes   • Self-Exams Yes   Social History Narrative   • Not on file         EXAMINATION     Physical Exam:   Vitals: /74 (BP Location: Left arm, Patient Position: Sitting, BP Cuff Size: Adult)   Pulse 69   Temp 37.2 °C (99 °F) (Temporal)   Ht 1.6 m (5' 3\")   Wt 75.9 kg (167 lb 5.3 oz)   SpO2 95%     Constitutional:   Body Habitus: Body mass index is 29.64 kg/m².  Cooperation: Fully cooperates with exam  Appearance: Well-groomed no disheveled    Respiratory-  breathing comfortable on room air, no audible wheezing  Cardiovascular- capillary refills " less than 2 seconds. No lower extremity edema is noted.   Psychiatric- alert and oriented ×3. Normal affect.    MSK and Neuro: -    There are no signs of infection around the injection sites.  Spurling's maneuver is positive on the right, negative on the left.  No tenderness palpation throughout the cervical spine        Key points for the international standards for neurological classification of spinal cord injury (ISNCSCI) to light touch.     Dermatome R L   C4 2 2   C5 2 2   C6 1 2   C7 1 2   C8 2 2   T1 2 2   T2 2 2                                         Motor Exam Upper Extremities   ? Myotome R L   Shoulder flexion C5 5 5   Elbow flexion C5 5 5   Wrist extension C6 5 5   Elbow extension C7 5 5   Finger flexion C8 5 5   Finger abduction T1 5 5       There is minimal leg swelling with no tenderness and no discoloration.          MEDICAL DECISION MAKING    DATA    Labs:   Lab Results   Component Value Date/Time    SODIUM 134 (L) 05/13/2020 09:21 AM    POTASSIUM 4.0 05/13/2020 09:21 AM    CHLORIDE 100 05/13/2020 09:21 AM    CO2 27 05/13/2020 09:21 AM    GLUCOSE 85 05/13/2020 09:21 AM    BUN 18 05/13/2020 09:21 AM    CREATININE 0.74 05/13/2020 09:21 AM        No results found for: PROTHROMBTM, INR     Lab Results   Component Value Date/Time    WBC 8.2 11/27/2015 03:39 AM    RBC 4.26 11/27/2015 03:39 AM    HEMOGLOBIN 12.8 11/27/2015 03:39 AM    HEMATOCRIT 40.1 11/27/2015 03:39 AM    MCV 94.1 11/27/2015 03:39 AM    MCH 30.0 11/27/2015 03:39 AM    MCHC 31.9 (L) 11/27/2015 03:39 AM    MPV 11.6 11/27/2015 03:39 AM    NEUTSPOLYS 82.80 (H) 11/27/2015 03:39 AM    LYMPHOCYTES 13.20 (L) 11/27/2015 03:39 AM    MONOCYTES 3.40 11/27/2015 03:39 AM    EOSINOPHILS 0.00 11/27/2015 03:39 AM    BASOPHILS 0.10 11/27/2015 03:39 AM        Lab Results   Component Value Date/Time    HBA1C 5.6 06/25/2014 11:27 AM          Imaging:   I personally reviewed following images    MRI cervical spine 6/ 17/2020  There is mild to moderate central  canal stenosis at C5-6 with severe left and moderate right neuroforaminal stenosis.  There is mild central canal stenosis at C6-7 with moderate bilateral neuroforaminal stenosis.  There is facet arthropathy right at C2-3.  Also facet arthropathy right worse than left at C3-4, C4-5, C5-6.    I reviewed the following radiology reports                                 Results for orders placed during the hospital encounter of 06/17/20   MR-CERVICAL SPINE-W/O    Impression 1.  Mild spinal canal narrowing at C5-6 and C6-7    2.  Foraminal stenoses at C5-6 and C6-7    3.  Multilevel disc bulge, endplate spurring, and facet arthropathy                     Results for orders placed during the hospital encounter of 06/02/20   DX-CERVICAL SPINE-WITH FLEX-EXT   7+    Impression 1.  No compression deformity or acute fracture.    2.  There is degenerative disc disease and facet arthropathy with bilateral osseous neural foraminal narrowing.    3.  No focal instability is noted on flexion extension views.                                                                                     DIAGNOSIS   Visit Diagnoses     ICD-10-CM   1. Cervical radiculopathy  M54.12   2. Right arm numbness  R20.0   3. Right arm pain  M79.601   4. Chronic neck pain  M54.2    G89.29   5. Carpal tunnel syndrome of right wrist  G56.01   6. Cervical stenosis of spinal canal  M48.02         ASSESSMENT and PLAN:     Shawn Westbrook 64 y.o. female      Shawn was seen today for follow-up.    Diagnoses and all orders for this visit:    Cervical radiculopathy  -     REFERRAL TO PHYSICAL MEDICINE REHAB    Right arm numbness    Right arm pain    Chronic neck pain    Carpal tunnel syndrome of right wrist    Cervical stenosis of spinal canal          The patient can continue gabapentin.  She is complaining of leg swelling and with the dose being so low as she is typically using 100-200 mg of gabapentin once daily it is possible that her leg swelling is coming from  another etiology.  I advised her to follow-up with her PCP in regards to this.  I also advised her to use compression stockings nightly.  There are no signs of a DVT at this point    I have ordered a C7-T1 interlaminar epidural steroid injection    The risks benefits and alternatives to this procedure were discussed and the patient wishes to proceed with the procedure. Risks include but are not limited to damage to surrounding structures, infection, bleeding, worsening of pain which can be permanent, weakness which can be permanent. Benefits include pain relief, improved function. Alternatives includes not doing the procedure.        Follow up: After the above procedure    Thank you for allowing me to participate in the care of this patient. If you have any questions please not hesitate to contact me.             Please note that this dictation was created using voice recognition software. I have made every reasonable attempt to correct obvious errors but there may be errors of grammar and content that I may have overlooked prior to finalization of this note.      Marco Rios MD  Interventional Spine and Sports Physiatry  Physical Medicine and Rehabilitation  [facility]

## 2020-07-15 NOTE — Clinical Note
She is having leg swelling which is typical with gabapentin being such low doses.  I advised her to wear compression stockings and follow-up with you.  No signs of DVT at this time.

## 2020-07-15 NOTE — PATIENT INSTRUCTIONS
Your procedure will be at the [facility] special procedure suite.    [facility] [place] [address]       PRE-PROCEDURE INSTRUCTIONS  You may take your regular medications except:   · No Anti-inflammatories 5 days prior to your procedure. Anti-inflammatories include medicines such as  ibuprofen (Motrin, Advil), Excedrin, Naproxen (Aleve, Anaprox, Naprelan, Naprosyn), Celecoxib (Celebrex), Diclofenac (Voltaren-XR tab), and Meloxicam (Mobic).   · No Glucophage or Metformin 24 hours before your procedure. You may resume next day after your procedure.  · Call the physiatry office if you are taking or prescribed anti-biotics within five days of procedure.  · Please ask provider if you are taking any new diabetes medication.  · CONTINUE TAKING BLOOD PRESSURE MEDICATIONS AS PRESCRIBED.  · Pain medications will not be prescribed on the procedure day. Procedural pain medication may be used by your provider   · Call your doctor's office performing the procedure if you have a fever, chills, rash or new illness prior to your procedure    Anticoagulation/antiplatelet medications  No Blood thinning medications such as Coumadin or Plavix 5 days prior to procedure unless your doctor said to continue these medications. Call your doctor if a new medication is prescribed in this class.     Restrictions for eating before procedure:   · If you are getting procedural sedation, then do not eat to for 8 hours prior to procedure appointment time. Do not drink fluids for four hours prior to your procedure time.   · If you are not having procedural sedation, then Skip the meal prior to your procedure. If you have a morning procedure then skip breakfast. If you have an afternoon procedure then skip lunch.   · You may drink clear liquids up to 2 hours prior to your procedure  · You must have a  the day of procedure to accompany you home.      POST PROCEDURE INSTRUCTIONS   · No heavy lifting, strenuous bending or  strenuous exercise for 3 days after your procedure.  · No hot tubs, baths, swimming for 3 days after your procedure  · You can remove the bandage the day after the procedure.  · IF YOU RECEIVED A STEROID INJECTION. PLEASE NOTE THAT THERE MAY BE A DELAY FOR THE INJECTION TO START WORKING, THE DELAY MAY BE UP TO TWO WEEKS. IF YOU HAVE DIABETES, PLEASE NOTE THAT YOUR SUGAR LEVELS MAY BE ELEVATED FOR 1-2 DAYS AFTER A STEROID INJECTION.  THE STEROID MAY CAUSE TEMPORARY SYMPTOMS WHICH USUALLY RESOLVE ON THEIR OWN WITHIN 1 TO 2 DAYS INCLUDING FACIAL FLUSHING OR A FEELING OF WARMTH ON THE FACE, TEMPORARY INCREASES IN BLOOD SUGAR, INSOMNIA, INCREASED HUNGER  · IF YOU EXPERIENCE PROLONGED WEAKNESS LONGER THAN ONE DAY, BOWEL OR BLADDER INCONTINENCE THEN PLEASE CALL THE PHYSIATRY OFFICE.  · Your leg may feel heavy, weak and numb for up to 1-2 days. Be very careful walking.   ·  You may resume normal activities 3 days after procedure.

## 2020-07-16 NOTE — NON-PROVIDER
PAT note: PAT call made 07/16/2020 at 1319. Spoke with Shawn. Health history, allergies, medications and pre-procedure/sedation instructions reviewed with patient. Pre-procedure respiratory screening completed. Pt denies being sick/symptomatic(fever, cough, shortness of breath)  within 72 hours or having been in close contact with symptomatic individuals in the past 2 weeks.Pt was informed to contact her physician should she or any close personal contact become symptomatic prior to the procedure. Pt was told to bring a mask as she would be wearing it during the entire stay. It was recommended that the pt self isolate until the procedure and that her  would have to remain on site in vehicle til pt is d/sky. Pt verbalized understanding of instructions.

## 2020-07-20 ENCOUNTER — HOSPITAL ENCOUNTER (OUTPATIENT)
Dept: RADIOLOGY | Facility: MEDICAL CENTER | Age: 65
End: 2020-07-20
Payer: MEDICARE

## 2020-07-21 ENCOUNTER — HOSPITAL ENCOUNTER (OUTPATIENT)
Dept: PAIN MANAGEMENT | Facility: REHABILITATION | Age: 65
End: 2020-07-21
Attending: PHYSICAL MEDICINE & REHABILITATION
Payer: MEDICARE

## 2020-07-21 ENCOUNTER — HOSPITAL ENCOUNTER (OUTPATIENT)
Dept: RADIOLOGY | Facility: REHABILITATION | Age: 65
End: 2020-07-21
Attending: PHYSICAL MEDICINE & REHABILITATION
Payer: MEDICARE

## 2020-07-21 VITALS
SYSTOLIC BLOOD PRESSURE: 105 MMHG | RESPIRATION RATE: 16 BRPM | BODY MASS INDEX: 29.41 KG/M2 | HEART RATE: 72 BPM | OXYGEN SATURATION: 95 % | DIASTOLIC BLOOD PRESSURE: 66 MMHG | WEIGHT: 166.01 LBS | HEIGHT: 63 IN | TEMPERATURE: 97.2 F

## 2020-07-21 PROCEDURE — 700111 HCHG RX REV CODE 636 W/ 250 OVERRIDE (IP)

## 2020-07-21 PROCEDURE — 700117 HCHG RX CONTRAST REV CODE 255

## 2020-07-21 PROCEDURE — 62321 NJX INTERLAMINAR CRV/THRC: CPT

## 2020-07-21 RX ORDER — LIDOCAINE HYDROCHLORIDE 10 MG/ML
INJECTION, SOLUTION EPIDURAL; INFILTRATION; INTRACAUDAL; PERINEURAL
Status: COMPLETED
Start: 2020-07-21 | End: 2020-07-21

## 2020-07-21 RX ORDER — ETODOLAC 400 MG/1
TABLET, EXTENDED RELEASE ORAL
Qty: 90 TAB | Refills: 0 | Status: SHIPPED | OUTPATIENT
Start: 2020-07-21 | End: 2020-08-11

## 2020-07-21 RX ORDER — DEXAMETHASONE SODIUM PHOSPHATE 10 MG/ML
INJECTION, SOLUTION INTRAMUSCULAR; INTRAVENOUS
Status: COMPLETED
Start: 2020-07-21 | End: 2020-07-21

## 2020-07-21 RX ADMIN — DEXAMETHASONE SODIUM PHOSPHATE 10 MG: 10 INJECTION, SOLUTION INTRAMUSCULAR; INTRAVENOUS at 09:00

## 2020-07-21 RX ADMIN — LIDOCAINE HYDROCHLORIDE 10 ML: 10 INJECTION, SOLUTION EPIDURAL; INFILTRATION; INTRACAUDAL; PERINEURAL at 09:00

## 2020-07-21 RX ADMIN — IOHEXOL 3 ML: 240 INJECTION, SOLUTION INTRATHECAL; INTRAVASCULAR; INTRAVENOUS; ORAL at 09:00

## 2020-07-21 NOTE — PROCEDURES
Date of Service: 7/21/2020    Physician/s: Marco Rios MD    Pre-operative Diagnosis: Cervical radiculopathy    Post-operative Diagnosis: Cervical radiculopathy    Procedure: C7-T1  Cervical interlaminar epidural steroid injection    Description of procedure:    The risks, benefits, and alternatives of the procedure were reviewed and discussed with the patient.  Written informed consent was freely obtained. A pre-procedural time-out was conducted by the physician verifying patient’s identity, procedure to be performed, procedure site and side, and allergy verification. Appropriate equipment was determined to be in place for the procedure.       The patient's vital signs were carefully monitored before, throughout, and after the procedure.     In the fluoroscopy suite the patient was placed in a prone position, a pillow placed underneath their chest. The skin was prepped and draped in the usual sterile fashion. The fluoroscope was placed over the cervical neck at the appropriate injection AP angle view, and the target for injection was marked. A 25g needle was placed into the marked site, and approx 2cc of 1% Lidocaine was injected subcutaneously into the epidermal and dermal layers. The needle was removed. A 20g Tuohy needle was then placed and advanced under fluoroscopic guidance into the RIGHT  C7-T1 interlaminar space at both the initial position AP view and contralateral oblique at a lateral view to ensure proper location of the needle tip at all times.  The needle was advanced with fluoroscopic guidance to the superior aspect of the T1 lamina.  Then a contralateral oblique view was used to advance the needle slightly towards the epidural space, A loss-of-resistance technique was used to guide the needle into the epidural space in a lateral fluoroscopic view and confirmed with loss of resistance with sterile normal saline. In the AP and lateral views, contrast dye was used.  to highlight the epidural space  spread while the fluoroscope was running live. Following negative aspiration, 1mL of 10mg/mL of dexamethasone followed by 2 mL of sterile saline . The needle was removed intact after restyleted. The patient's back was covered with a 4x4 gauze, the area was cleansed with sterile normal saline, and a dressing was applied. There were no complications noted.     The patient was then evaluated post-procedure, and was hemodynamically stable prior to leaving the post-operative care unit.     Marco Rios MD  Physical Medicine and Rehabilitation  Interventional Spine and Sports Physiatry  [facility]        CPT  interlaminar cervical epidural: [id number]

## 2020-07-21 NOTE — NON-PROVIDER
Current meds. See medication reconciliation form. Reviewed with pt. Pt denies taking ASA,other blood thinners or anti-inflammatories. Pre-procedure assessment completed and information  communicated to procedure room RN during handoff. Pt has a ride post-procedure ( is ). Printed and verbal discharge instructions as well as education regarding infection prevention given to pt/pt's family who verbalized understanding.

## 2020-07-21 NOTE — NON-PROVIDER
Attempt 2 to schedule surgery. [employer] message sent to patient as phone is not currently taking phone calls (busy signal when calling)   Preprocedure assessment completed.  Pertinent health information MVP, Gerd communicated to physician and staff during time out.  Patient positioned preprocedure by RN, CST, and XRAY.  Pillow under knees for support.

## 2020-07-21 NOTE — NON-PROVIDER
Pt tolerated po fluid post procedure without nausea or vomiting. Did c/o slight headache. No other s/s. VSS Ambulated without difficulty. Discharged into care of responsible adult.

## 2020-07-22 ENCOUNTER — TELEPHONE (OUTPATIENT)
Dept: PHYSICAL MEDICINE AND REHAB | Facility: MEDICAL CENTER | Age: 65
End: 2020-07-22

## 2020-07-22 NOTE — TELEPHONE ENCOUNTER
Spoke to Pt in regards to her SP that was done with Dr. Rios dated 7/21/2020 for her C7-T1 interlaminar epidural steroid injection and she mentioned that she is fine.    Thank you  Maddy

## 2020-08-03 DIAGNOSIS — L71.9 ROSACEA: ICD-10-CM

## 2020-08-04 RX ORDER — METRONIDAZOLE 7.5 MG/G
GEL TOPICAL
Qty: 45 G | Refills: 5 | Status: SHIPPED | OUTPATIENT
Start: 2020-08-04 | End: 2021-10-04 | Stop reason: SDUPTHER

## 2020-08-11 ENCOUNTER — TELEPHONE (OUTPATIENT)
Dept: MEDICAL GROUP | Facility: PHYSICIAN GROUP | Age: 65
End: 2020-08-11

## 2020-08-11 RX ORDER — ETODOLAC 400 MG/1
TABLET, EXTENDED RELEASE ORAL
Qty: 90 TAB | Refills: 0 | Status: SHIPPED | OUTPATIENT
Start: 2020-08-11 | End: 2020-09-08

## 2020-08-11 NOTE — TELEPHONE ENCOUNTER
----- Message from Shawn Westbrook sent at 8/11/2020  9:21 AM PDT -----  Regarding: Prescription Question  Contact: [phone]  From [facility] I have been receiving a 1 month supply of Etodolac. Could you please change it to a 3 month supply 400 mg 3x daily.   Thank you

## 2020-08-12 ENCOUNTER — TELEPHONE (OUTPATIENT)
Dept: PHYSICAL MEDICINE AND REHAB | Facility: MEDICAL CENTER | Age: 65
End: 2020-08-12

## 2020-08-12 ENCOUNTER — OFFICE VISIT (OUTPATIENT)
Dept: PHYSICAL MEDICINE AND REHAB | Facility: MEDICAL CENTER | Age: 65
End: 2020-08-12
Payer: MEDICARE

## 2020-08-12 VITALS
HEIGHT: 63 IN | TEMPERATURE: 97.7 F | HEART RATE: 71 BPM | BODY MASS INDEX: 29.77 KG/M2 | DIASTOLIC BLOOD PRESSURE: 70 MMHG | OXYGEN SATURATION: 95 % | WEIGHT: 167.99 LBS | SYSTOLIC BLOOD PRESSURE: 110 MMHG

## 2020-08-12 DIAGNOSIS — R20.0 RIGHT ARM NUMBNESS: ICD-10-CM

## 2020-08-12 DIAGNOSIS — G89.29 CHRONIC LEFT-SIDED LOW BACK PAIN WITH LEFT-SIDED SCIATICA: ICD-10-CM

## 2020-08-12 DIAGNOSIS — G56.01 CARPAL TUNNEL SYNDROME OF RIGHT WRIST: ICD-10-CM

## 2020-08-12 DIAGNOSIS — M54.16 LUMBAR RADICULOPATHY: ICD-10-CM

## 2020-08-12 DIAGNOSIS — M54.12 CERVICAL RADICULOPATHY: ICD-10-CM

## 2020-08-12 DIAGNOSIS — M79.601 RIGHT ARM PAIN: ICD-10-CM

## 2020-08-12 DIAGNOSIS — G89.29 CHRONIC NECK PAIN: ICD-10-CM

## 2020-08-12 DIAGNOSIS — M54.42 CHRONIC LEFT-SIDED LOW BACK PAIN WITH LEFT-SIDED SCIATICA: ICD-10-CM

## 2020-08-12 DIAGNOSIS — M54.2 CHRONIC NECK PAIN: ICD-10-CM

## 2020-08-12 PROCEDURE — 99214 OFFICE O/P EST MOD 30 MIN: CPT | Performed by: PHYSICAL MEDICINE & REHABILITATION

## 2020-08-12 RX ORDER — GABAPENTIN 100 MG/1
100-400 CAPSULE ORAL NIGHTLY PRN
Qty: 120 CAP | Refills: 11 | Status: SHIPPED | OUTPATIENT
Start: 2020-08-12 | End: 2022-09-13

## 2020-08-12 RX ORDER — GABAPENTIN 100 MG/1
100-400 CAPSULE ORAL NIGHTLY PRN
Qty: 120 CAP | Refills: 11 | Status: SHIPPED | OUTPATIENT
Start: 2020-08-12 | End: 2020-08-12

## 2020-08-12 ASSESSMENT — PAIN SCALES - GENERAL: PAINLEVEL: 7=MODERATE-SEVERE PAIN

## 2020-08-12 ASSESSMENT — PATIENT HEALTH QUESTIONNAIRE - PHQ9: CLINICAL INTERPRETATION OF PHQ2 SCORE: 0

## 2020-08-12 NOTE — PROGRESS NOTES
Follow up patient note  Interventional spine and sports physiatry, Physical medicine rehabilitation      Chief complaint:   Chief Complaint   Patient presents with   • Follow-Up     Back pain          HISTORY    Please see new patient note by Dr Rios,  for more details.     HPI  Patient identification: Shawn Westbroko  1955,   female  With Diagnoses of Cervical radiculopathy, Chronic neck pain, Right arm pain, Right arm numbness, Carpal tunnel syndrome of right wrist, Lumbar radiculopathy, and Chronic left-sided low back pain with left-sided sciatica were pertinent to this visit.     Procedures:  2020 C7-T1 interlaminar epidural steroid injection with 100% improvement in pain and function in regards to the neck and arm pain after the injection follow-up visit on 2020    The patient now has pain in the left low back radiating down the left leg 7/10 in intensity aching and shooting in quality constant worse with bending forward.    She does get pain numbness and burning in the right hand in the palmar aspect of the first three digits. She has been wearing her wrist splints and having improvement of this. This is intermittent and mild. Denies weakness.        ROS Red Flags :   Fever, Chills, Sweats: Denies  Involuntary Weight Loss: Denies  Bowel/Bladder Incontinence: Denies  Saddle Anesthesia: Denies        PMHx:   Past Medical History:   Diagnosis Date   • Chronic low back pain    • MVP (mitral valve prolapse)    • Pericarditis        PSHx:   Past Surgical History:   Procedure Laterality Date   • ABDOMINAL HYSTERECTOMY TOTAL      cervix remains and one ovary.    • ARTHROPLASTY      knee, right   • LAMINOTOMY      L4-5, L5-S1 fusion   • OTHER ORTHOPEDIC SURGERY      spinal fusion   • OTHER ORTHOPEDIC SURGERY      right knee replacement       Family history   Family History   Problem Relation Age of Onset   • Alcohol/Drug Mother    • Heart Disease Mother    • Diabetes Sister    • Diabetes  Sister          Medications:   Outpatient Medications Marked as Taking for the 8/12/20 encounter (Office Visit) with Marco Rios M.D.   Medication Sig Dispense Refill   • gabapentin (NEURONTIN) 100 MG Cap Take 1-4 Caps by mouth at bedtime as needed (pain). 120 Cap 11   • etodolac (LODINE XL) 400 MG SR tablet TAKE 1 TABLET BY MOUTH 3  TIMES DAILY 90 Tab 0   • metronidazole (METROGEL) 0.75 % gel APPLY A THIN LAYER  TOPICALLY TO AFFECTED AREA  TWICE DAILY 45 g 5   • simvastatin (ZOCOR) 20 MG Tab Take 1 Tab by mouth every evening. 90 Tab 3   • cyclosporin (RESTASIS) 0.05 % ophthalmic emulsion Place 1 Drop in both eyes 2 times a day. [facility] vials 180 Each 3   • estradiol (ESTRACE) 1 MG Tab TAKE 1 TABLET BY MOUTH EVERY DAY 90 Tab 3   • omeprazole (PRILOSEC) 40 MG delayed-release capsule TAKE 1 CAPSULE BY MOUTH ONCE DAILY 90 Cap 3   • Loteprednol Etabonate 0.2 % Suspension Place 1 Drop in both eyes 2 times a day as needed. Indications: Seasonal Allergic Conjunctivitis          Current Outpatient Medications on File Prior to Visit   Medication Sig Dispense Refill   • etodolac (LODINE XL) 400 MG SR tablet TAKE 1 TABLET BY MOUTH 3  TIMES DAILY 90 Tab 0   • metronidazole (METROGEL) 0.75 % gel APPLY A THIN LAYER  TOPICALLY TO AFFECTED AREA  TWICE DAILY 45 g 5   • simvastatin (ZOCOR) 20 MG Tab Take 1 Tab by mouth every evening. 90 Tab 3   • cyclosporin (RESTASIS) 0.05 % ophthalmic emulsion Place 1 Drop in both eyes 2 times a day. [facility]ivid[facility] vials 180 Each 3   • estradiol (ESTRACE) 1 MG Tab TAKE 1 TABLET BY MOUTH EVERY DAY 90 Tab 3   • omeprazole (PRILOSEC) 40 MG delayed-release capsule TAKE 1 CAPSULE BY MOUTH ONCE DAILY 90 Cap 3   • Loteprednol Etabonate 0.2 % Suspension Place 1 Drop in both eyes 2 times a day as needed. Indications: Seasonal Allergic Conjunctivitis       No current facility-administered medications on file prior to visit.          Allergies:   Allergies   Allergen Reactions   • Other Drug Unspecified      Steroid used with a previous epidural caused hot flashes and flushing. Exact one unknown.       Social Hx:   Social History     Socioeconomic History   • Marital status:      Spouse name: Not on file   • Number of children: Not on file   • Years of education: Not on file   • Highest education level: Not on file   Occupational History     Employer: OTHER     Comment: retired   Social Needs   • Financial resource strain: Not on file   • Food insecurity     Worry: Not on file     Inability: Not on file   • Transportation needs     Medical: Not on file     Non-medical: Not on file   Tobacco Use   • Smoking status: Former Smoker     Quit date: 1988     Years since quittin.2   • Smokeless tobacco: Never Used   Substance and Sexual Activity   • Alcohol use: Yes     Alcohol/week: 0.0 oz     Comment: rare   • Drug use: No   • Sexual activity: Not Currently     Comment:    Lifestyle   • Physical activity     Days per week: Not on file     Minutes per session: Not on file   • Stress: Not on file   Relationships   • Social connections     Talks on phone: Not on file     Gets together: Not on file     Attends Buddhism service: Not on file     Active member of club or organization: Not on file     Attends meetings of clubs or organizations: Not on file     Relationship status: Not on file   • Intimate partner violence     Fear of current or ex partner: Not on file     Emotionally abused: Not on file     Physically abused: Not on file     Forced sexual activity: Not on file   Other Topics Concern   •  Service No   • Blood Transfusions No   • Caffeine Concern No   • Occupational Exposure No   • Hobby Hazards No   • Sleep Concern Yes     Comment: due to pain   • Stress Concern Yes   • Weight Concern No   • Special Diet No   • Back Care No   • Exercise Yes   • Bike Helmet No   • Seat Belt Yes   • Self-Exams Yes   Social History Narrative   • Not on file         EXAMINATION     Physical Exam:   Vitals:  "/70 (BP Location: Left arm, Patient Position: Sitting, BP Cuff Size: Adult)   Pulse 71   Temp 36.5 °C (97.7 °F) (Temporal)   Ht 1.6 m (5' 3\")   Wt 76.2 kg (167 lb 15.9 oz)   SpO2 95%     Constitutional:   Body Habitus: Body mass index is 29.76 kg/m².  Cooperation: Fully cooperates with exam  Appearance: Well-groomed no disheveled    Respiratory-  breathing comfortable on room air, no audible wheezing  Cardiovascular- capillary refills less than 2 seconds. No lower extremity edema is noted.   Psychiatric- alert and oriented ×3. Normal affect.    MSK and Neuro: -    Bilateral hands:   Inspection: No swelling,  Deformities or rashes. Symmetric appearing thenar and hyperthenar regions bilaterally.  Palpation no significant tenderness to palpation throughout the bilateral hands  Range of motion is within normal limits throughout bilateral hands, fingers and wrist.  Special tests:  Carpal tunnel compression: Negative bilaterally  Phalen's test: Negative bilaterally  Finkelstein's test: Negative bilaterally     There are no signs of infection around the injection sites.       Key points for the international standards for neurological classification of spinal cord injury (ISNCSCI) to light touch.     Dermatome R L   C4 2 2   C5 2 2   C6 2 2   C7 2 2   C8 2 2   T1 2 2   T2 2 2                                         Motor Exam Upper Extremities   ? Myotome R L   Shoulder flexion C5 5 5   Elbow flexion C5 5 5   Wrist extension C6 5 5   Elbow extension C7 5 5   Finger flexion C8 5 5   Finger abduction T1 5 5       There are no signs of infection around the injection sites.   decreased active range of motion with flexion, lateral flexion, and rotation bilaterally.   There is decreased active range of motion with lumbar extension.      Palpation:   No tenderness to palpation in midline at T1-T12 levels. No tenderness to palpation in the left and right of the midline T1-L5, NEGATIVE for tenderness to palpation to the " para-midline region in the lower lumbar levels.  palpation over SI joint: negative bilaterally    palpation in hip or over the greater trochanters: negative bilaterally      Lumbar spine Special tests  Neuro tension  Straight leg test negative right, positive left    Slump test negative right, positive left      Key points for the international standards for neurological classification of spinal cord injury (ISNCSCI) to light touch.     Dermatome R L                                      L2 2 2   L3 2 2   L4 2 2   L5 2 2   S1 2 2   S2 2 2         Motor Exam Lower Extremities    ? Myotome R L   Hip flexion L2 5 5   Knee extension L3 5 5   Ankle dorsiflexion L4 5 5   Toe extension L5 5 5   Ankle plantarflexion S1 5 5             MEDICAL DECISION MAKING    DATA    Labs:   Lab Results   Component Value Date/Time    SODIUM 134 (L) 05/13/2020 09:21 AM    POTASSIUM 4.0 05/13/2020 09:21 AM    CHLORIDE 100 05/13/2020 09:21 AM    CO2 27 05/13/2020 09:21 AM    GLUCOSE 85 05/13/2020 09:21 AM    BUN 18 05/13/2020 09:21 AM    CREATININE 0.74 05/13/2020 09:21 AM        No results found for: PROTHROMBTM, INR     Lab Results   Component Value Date/Time    WBC 8.2 11/27/2015 03:39 AM    RBC 4.26 11/27/2015 03:39 AM    HEMOGLOBIN 12.8 11/27/2015 03:39 AM    HEMATOCRIT 40.1 11/27/2015 03:39 AM    MCV 94.1 11/27/2015 03:39 AM    MCH 30.0 11/27/2015 03:39 AM    MCHC 31.9 (L) 11/27/2015 03:39 AM    MPV 11.6 11/27/2015 03:39 AM    NEUTSPOLYS 82.80 (H) 11/27/2015 03:39 AM    LYMPHOCYTES 13.20 (L) 11/27/2015 03:39 AM    MONOCYTES 3.40 11/27/2015 03:39 AM    EOSINOPHILS 0.00 11/27/2015 03:39 AM    BASOPHILS 0.10 11/27/2015 03:39 AM        Lab Results   Component Value Date/Time    HBA1C 5.6 06/25/2014 11:27 AM          Imaging:   I personally reviewed following images    MRI cervical spine 6/ 17/2020  There is mild to moderate central canal stenosis at C5-6 with severe left and moderate right neuroforaminal stenosis.  There is mild central  canal stenosis at C6-7 with moderate bilateral neuroforaminal stenosis.  There is facet arthropathy right at C2-3.  Also facet arthropathy right worse than left at C3-4, C4-5, C5-6.    I reviewed the following radiology reports                                 Results for orders placed during the hospital encounter of 20   MR-CERVICAL SPINE-W/O    Impression 1.  Mild spinal canal narrowing at C5-6 and C6-7    2.  Foraminal stenoses at C5-6 and C6-7    3.  Multilevel disc bulge, endplate spurring, and facet arthropathy                     Results for orders placed during the hospital encounter of 20   DX-CERVICAL SPINE-WITH FLEX-EXT   7+    Impression 1.  No compression deformity or acute fracture.    2.  There is degenerative disc disease and facet arthropathy with bilateral osseous neural foraminal narrowing.    3.  No focal instability is noted on flexion extension views.                                                                                     DIAGNOSIS   Visit Diagnoses     ICD-10-CM   1. Cervical radiculopathy  M54.12   2. Chronic neck pain  M54.2    G89.29   3. Right arm pain  M79.601   4. Right arm numbness  R20.0   5. Carpal tunnel syndrome of right wrist  G56.01   6. Lumbar radiculopathy  M54.16   7. Chronic left-sided low back pain with left-sided sciatica  M54.42    G89.29         ASSESSMENT and PLAN:     Shawn Westbrook  1955,   female      Shawn was seen today for follow-up.    Diagnoses and all orders for this visit:    Cervical radiculopathy  -     gabapentin (NEURONTIN) 100 MG Cap; Take 1-4 Caps by mouth at bedtime as needed (pain).    Chronic neck pain  -     gabapentin (NEURONTIN) 100 MG Cap; Take 1-4 Caps by mouth at bedtime as needed (pain).    Right arm pain  -     gabapentin (NEURONTIN) 100 MG Cap; Take 1-4 Caps by mouth at bedtime as needed (pain).    Right arm numbness  -     gabapentin (NEURONTIN) 100 MG Cap; Take 1-4 Caps by mouth at bedtime as needed  (pain).    Carpal tunnel syndrome of right wrist  -     gabapentin (NEURONTIN) 100 MG Cap; Take 1-4 Caps by mouth at bedtime as needed (pain).    Lumbar radiculopathy  -     gabapentin (NEURONTIN) 100 MG Cap; Take 1-4 Caps by mouth at bedtime as needed (pain).    Chronic left-sided low back pain with left-sided sciatica  -     gabapentin (NEURONTIN) 100 MG Cap; Take 1-4 Caps by mouth at bedtime as needed (pain).        Back/hip exercises prescribed for the patient.     The patient had an outstanding response with near complete resolution of her pain and dramatic improvement in function with notion of her ADLs in regards to her cervical radiculopathy.    She still does have issues with her lumbar radiculopathy.  We discussed an epidural but decided to proceed conservatively with this first with a new home exercise program.  I would consider patient for a lumbar epidural at the most affected level depending on her results to conservative treatment.    For the patient's carpal tunnel syndrome continue neutral wrist splints nightly.      Follow up: 3-6 months PRN    Thank you for allowing me to participate in the care of this patient. If you have any questions please not hesitate to contact me.             Please note that this dictation was created using voice recognition software. I have made every reasonable attempt to correct obvious errors but there may be errors of grammar and content that I may have overlooked prior to finalization of this note.      Marco Rios MD  Interventional Spine and Sports Physiatry  Physical Medicine and Rehabilitation  [facility]

## 2020-09-08 RX ORDER — ETODOLAC 400 MG/1
400 TABLET, EXTENDED RELEASE ORAL DAILY
Qty: 90 TAB | Refills: 1 | Status: SHIPPED | OUTPATIENT
Start: 2020-09-08 | End: 2020-09-28

## 2020-09-28 DIAGNOSIS — M19.90 OSTEOARTHRITIS, UNSPECIFIED OSTEOARTHRITIS TYPE, UNSPECIFIED SITE: ICD-10-CM

## 2020-09-28 RX ORDER — MELOXICAM 15 MG/1
7.5-15 TABLET ORAL DAILY
Qty: 90 TAB | Refills: 0 | Status: SHIPPED | OUTPATIENT
Start: 2020-09-28 | End: 2020-09-28 | Stop reason: SDUPTHER

## 2020-09-28 RX ORDER — MELOXICAM 15 MG/1
7.5-15 TABLET ORAL DAILY
Qty: 90 TAB | Refills: 0 | Status: SHIPPED | OUTPATIENT
Start: 2020-09-28 | End: 2021-07-28

## 2020-10-13 RX ORDER — OMEPRAZOLE 40 MG/1
CAPSULE, DELAYED RELEASE ORAL
Qty: 90 CAP | Refills: 1 | Status: SHIPPED | OUTPATIENT
Start: 2020-10-13 | End: 2022-03-07 | Stop reason: SDUPTHER

## 2021-03-03 DIAGNOSIS — Z23 NEED FOR VACCINATION: ICD-10-CM

## 2021-07-28 ENCOUNTER — OFFICE VISIT (OUTPATIENT)
Dept: MEDICAL GROUP | Facility: PHYSICIAN GROUP | Age: 66
End: 2021-07-28
Payer: MEDICARE

## 2021-07-28 VITALS
HEART RATE: 72 BPM | RESPIRATION RATE: 16 BRPM | WEIGHT: 147.8 LBS | OXYGEN SATURATION: 99 % | BODY MASS INDEX: 26.19 KG/M2 | HEIGHT: 63 IN | SYSTOLIC BLOOD PRESSURE: 104 MMHG | TEMPERATURE: 99 F | DIASTOLIC BLOOD PRESSURE: 72 MMHG

## 2021-07-28 DIAGNOSIS — L71.9 ROSACEA: ICD-10-CM

## 2021-07-28 DIAGNOSIS — Z12.31 ENCOUNTER FOR SCREENING MAMMOGRAM FOR MALIGNANT NEOPLASM OF BREAST: ICD-10-CM

## 2021-07-28 DIAGNOSIS — Z00.00 WELLNESS EXAMINATION: ICD-10-CM

## 2021-07-28 DIAGNOSIS — E78.2 MIXED HYPERLIPIDEMIA: ICD-10-CM

## 2021-07-28 DIAGNOSIS — I34.1 MVP (MITRAL VALVE PROLAPSE): ICD-10-CM

## 2021-07-28 DIAGNOSIS — L98.9 SKIN LESION: ICD-10-CM

## 2021-07-28 DIAGNOSIS — N95.9 POSTMENOPAUSAL SYMPTOMS: ICD-10-CM

## 2021-07-28 DIAGNOSIS — G89.29 CHRONIC NECK PAIN: ICD-10-CM

## 2021-07-28 DIAGNOSIS — M54.2 CHRONIC NECK PAIN: ICD-10-CM

## 2021-07-28 PROCEDURE — 99214 OFFICE O/P EST MOD 30 MIN: CPT | Performed by: FAMILY MEDICINE

## 2021-07-28 RX ORDER — SIMVASTATIN 20 MG
20 TABLET ORAL EVERY EVENING
Qty: 90 TABLET | Refills: 0 | Status: SHIPPED | OUTPATIENT
Start: 2021-07-28 | End: 2021-09-14 | Stop reason: SDUPTHER

## 2021-07-28 NOTE — PROGRESS NOTES
Subjective:     CC:   Chief Complaint   Patient presents with   • Establish Care       HPI:   Shawn presents today to establish care.  Patient does have an area on her right forearm that has darkened in color in the last couple of months.  Patient is also about to run out of her simvastatin and wanted to see if she should just not take it at all.  Reviewing patient's she was placed on simvastatin after  the last cholesterol which was elevated.  I would recommend checking her lipid panel while she is on the simvastatin it may take a week for her to get a new prescription would recommend rechecking her lab work in a month.  Patient is agreeable to get her mammogram done patient continue monthly self breast exam this.  Patient thinks she may have had a colonoscopy in  unfortunately I am unable to find the results this was done sometime someplace in [place].  I would recommend if she would like to try Cologuard instead and patient was very agreeable with this.    Past Medical History:   Diagnosis Date   • Chronic low back pain    • MVP (mitral valve prolapse)    • Pericarditis        Social History     Tobacco Use   • Smoking status: Former Smoker     Years: 8.00     Quit date: 1988     Years since quittin.1   • Smokeless tobacco: Never Used   Vaping Use   • Vaping Use: Never used   Substance Use Topics   • Alcohol use: Yes     Alcohol/week: 0.0 oz     Comment: rare   • Drug use: No       Current Outpatient Medications Ordered in Epic   Medication Sig Dispense Refill   • simvastatin (ZOCOR) 20 MG Tab Take 1 tablet by mouth every evening. 90 tablet 0   • omeprazole (PRILOSEC) 40 MG delayed-release capsule TAKE 1 CAPSULE BY MOUTH  ONCE DAILY 90 Cap 1   • gabapentin (NEURONTIN) 100 MG Cap Take 1-4 Caps by mouth at bedtime as needed (pain). 120 Cap 11   • metronidazole (METROGEL) 0.75 % gel APPLY A THIN LAYER  TOPICALLY TO AFFECTED AREA  TWICE DAILY 45 g 5   • cyclosporin (RESTASIS) 0.05 % ophthalmic  "emulsion Place 1 Drop in both eyes 2 times a day. [facility]ivid[facility] vials 180 Each 3   • estradiol (ESTRACE) 1 MG Tab TAKE 1 TABLET BY MOUTH EVERY DAY 90 Tab 3     No current Epic-ordered facility-administered medications on file.       Allergies:  Other drug    Health Maintenance: Completed    ROS:  Gen: no fevers/chills, patient is working on weight loss and has lost weight since she was last seen  Eyes: no changes in vision  ENT: no sore throat, no hearing loss, no bloody nose  Pulm: no sob, no cough  CV: no chest pain, no palpitations  GI: no nausea/vomiting, no diarrhea  : no dysuria  MSk: no myalgias  Skin: no rash  Neuro: no headaches, no numbness/tingling  Heme/Lymph: no easy bruising    Objective:     Exam:  /72   Pulse 72   Temp 37.2 °C (99 °F)   Resp 16   Ht 1.6 m (5' 3\")   Wt 67 kg (147 lb 12.8 oz)   SpO2 99%   BMI 26.18 kg/m²  Body mass index is 26.18 kg/m².    Gen: Alert and oriented, No apparent distress.  Skin: Warm and dry.  No obvious lesions.  Eyes: Sclera wnl Pupils normal in size  Lungs: Normal effort, CTA bilaterally, no wheezes, rhonchi, or rales  CV: Regular rate and rhythm. No murmurs, rubs, or gallops.  ABD: Soft non-tender no organomegaly  Musculoskeletal: Normal gait. No extremity cyanosis, clubbing, or edema.  Neuro: Oriented to person, place and time  Psych: Mood is wnl     Labs: We will order fasting lab work patient get it done in 1 month.  Patient wanted me just to go ahead and order it now for    Assessment & Plan:     65 y.o. female with the following -     1. MVP (mitral valve prolapse)  Patient states she was seen cardiology long time ago and everything was fine states she has no need to follow-up with them this is a chronic problem  2. Rosacea  Patient wanted to try and topical ointment that has clindamycin (zilix) recommend she check and make sure that Medicare will cover for it.  This is a chronic problem    3. Chronic neck pain  Patient will be trying to take " over-the-counter ibuprofen and let me know how it goes this is a chronic problem    4. Postmenopausal symptoms  Patient is taking estrogen every other day for hot flashes.  This is a  Chronic problem    5. Mixed hyperlipidemia  We will do her lab in 1 month this is a chronic problem    6. Wellness examination  Patient did not think she still has a cervix but will given her last Pap patient will need follow-up for a Pap smear  - COLOGUARD (FIT DNA)    7. Encounter for screening mammogram for malignant neoplasm of breast  - MA-SCREENING MAMMO BILAT W/TOMOSYNTHESIS W/CAD; Future  8. Skin lesion    Referral to dermatology did mention to patient she may want to check with the outside dermatology clinics they may be able to see her sooner if that is the case more than happy to write a referral for her patient to let me know which dermatology she will be going to  Other orders  - simvastatin (ZOCOR) 20 MG Tab; Take 1 tablet by mouth every evening.  Dispense: 90 tablet; Refill: 0       Return in about 4 weeks (around 8/25/2021).    Please note that this dictation was created using voice recognition software. I have made every reasonable attempt to correct obvious errors, but I expect that there are errors of grammar and possibly content that I did not discover before finalizing the note.

## 2021-08-16 ENCOUNTER — HOSPITAL ENCOUNTER (OUTPATIENT)
Dept: RADIOLOGY | Facility: MEDICAL CENTER | Age: 66
End: 2021-08-16
Payer: MEDICARE

## 2021-09-14 ENCOUNTER — HOSPITAL ENCOUNTER (OUTPATIENT)
Dept: LAB | Facility: MEDICAL CENTER | Age: 66
End: 2021-09-14
Attending: FAMILY MEDICINE
Payer: MEDICARE

## 2021-09-14 DIAGNOSIS — L71.9 ROSACEA: ICD-10-CM

## 2021-09-14 DIAGNOSIS — E78.2 MIXED HYPERLIPIDEMIA: ICD-10-CM

## 2021-09-14 LAB
ALBUMIN SERPL BCP-MCNC: 3.8 G/DL (ref 3.2–4.9)
ALBUMIN/GLOB SERPL: 1.3 G/DL
ALP SERPL-CCNC: 67 U/L (ref 30–99)
ALT SERPL-CCNC: 10 U/L (ref 2–50)
ANION GAP SERPL CALC-SCNC: 8 MMOL/L (ref 7–16)
AST SERPL-CCNC: 17 U/L (ref 12–45)
BASOPHILS # BLD AUTO: 1.3 % (ref 0–1.8)
BASOPHILS # BLD: 0.06 K/UL (ref 0–0.12)
BILIRUB SERPL-MCNC: 0.3 MG/DL (ref 0.1–1.5)
BUN SERPL-MCNC: 15 MG/DL (ref 8–22)
CALCIUM SERPL-MCNC: 9.3 MG/DL (ref 8.5–10.5)
CHLORIDE SERPL-SCNC: 107 MMOL/L (ref 96–112)
CHOLEST SERPL-MCNC: 173 MG/DL (ref 100–199)
CO2 SERPL-SCNC: 27 MMOL/L (ref 20–33)
CREAT SERPL-MCNC: 0.75 MG/DL (ref 0.5–1.4)
EOSINOPHIL # BLD AUTO: 0.09 K/UL (ref 0–0.51)
EOSINOPHIL NFR BLD: 2 % (ref 0–6.9)
ERYTHROCYTE [DISTWIDTH] IN BLOOD BY AUTOMATED COUNT: 48.4 FL (ref 35.9–50)
FASTING STATUS PATIENT QL REPORTED: NORMAL
GLOBULIN SER CALC-MCNC: 2.9 G/DL (ref 1.9–3.5)
GLUCOSE SERPL-MCNC: 92 MG/DL (ref 65–99)
HCT VFR BLD AUTO: 42.2 % (ref 37–47)
HDLC SERPL-MCNC: 66 MG/DL
HGB BLD-MCNC: 13.3 G/DL (ref 12–16)
IMM GRANULOCYTES # BLD AUTO: 0.01 K/UL (ref 0–0.11)
IMM GRANULOCYTES NFR BLD AUTO: 0.2 % (ref 0–0.9)
LDLC SERPL CALC-MCNC: 88 MG/DL
LYMPHOCYTES # BLD AUTO: 1.31 K/UL (ref 1–4.8)
LYMPHOCYTES NFR BLD: 28.4 % (ref 22–41)
MCH RBC QN AUTO: 30.5 PG (ref 27–33)
MCHC RBC AUTO-ENTMCNC: 31.5 G/DL (ref 33.6–35)
MCV RBC AUTO: 96.8 FL (ref 81.4–97.8)
MONOCYTES # BLD AUTO: 0.33 K/UL (ref 0–0.85)
MONOCYTES NFR BLD AUTO: 7.2 % (ref 0–13.4)
NEUTROPHILS # BLD AUTO: 2.81 K/UL (ref 2–7.15)
NEUTROPHILS NFR BLD: 60.9 % (ref 44–72)
NRBC # BLD AUTO: 0 K/UL
NRBC BLD-RTO: 0 /100 WBC
PLATELET # BLD AUTO: 227 K/UL (ref 164–446)
PMV BLD AUTO: 12.9 FL (ref 9–12.9)
POTASSIUM SERPL-SCNC: 4.3 MMOL/L (ref 3.6–5.5)
PROT SERPL-MCNC: 6.7 G/DL (ref 6–8.2)
RBC # BLD AUTO: 4.36 M/UL (ref 4.2–5.4)
SODIUM SERPL-SCNC: 142 MMOL/L (ref 135–145)
TRIGL SERPL-MCNC: 95 MG/DL (ref 0–149)
WBC # BLD AUTO: 4.6 K/UL (ref 4.8–10.8)

## 2021-09-14 PROCEDURE — 80053 COMPREHEN METABOLIC PANEL: CPT

## 2021-09-14 PROCEDURE — 36415 COLL VENOUS BLD VENIPUNCTURE: CPT

## 2021-09-14 PROCEDURE — 80061 LIPID PANEL: CPT

## 2021-09-14 PROCEDURE — 85025 COMPLETE CBC W/AUTO DIFF WBC: CPT

## 2021-10-04 DIAGNOSIS — L71.9 ROSACEA: ICD-10-CM

## 2021-10-04 NOTE — TELEPHONE ENCOUNTER
----- Message from Shawn Westbrook sent at 10/4/2021 12:34 PM PDT -----  Regarding: Metronidazole for rosacea   Please call in the above medication to [facility]. I tried to order it from [facility] however it won’t allow me to do so.   Thank you  Shawn Westbrook

## 2021-10-06 RX ORDER — METRONIDAZOLE 7.5 MG/G
GEL TOPICAL
Qty: 45 G | Refills: 5 | Status: SHIPPED | OUTPATIENT
Start: 2021-10-06 | End: 2022-09-26

## 2021-12-15 ENCOUNTER — OFFICE VISIT (OUTPATIENT)
Dept: MEDICAL GROUP | Facility: PHYSICIAN GROUP | Age: 66
End: 2021-12-15
Payer: MEDICARE

## 2021-12-15 ENCOUNTER — HOSPITAL ENCOUNTER (OUTPATIENT)
Facility: MEDICAL CENTER | Age: 66
End: 2021-12-15
Attending: FAMILY MEDICINE
Payer: MEDICARE

## 2021-12-15 VITALS
DIASTOLIC BLOOD PRESSURE: 76 MMHG | BODY MASS INDEX: 27.04 KG/M2 | SYSTOLIC BLOOD PRESSURE: 108 MMHG | HEART RATE: 69 BPM | HEIGHT: 63 IN | WEIGHT: 152.6 LBS | OXYGEN SATURATION: 97 % | RESPIRATION RATE: 16 BRPM | TEMPERATURE: 98.7 F

## 2021-12-15 DIAGNOSIS — M25.511 CHRONIC RIGHT SHOULDER PAIN: ICD-10-CM

## 2021-12-15 DIAGNOSIS — G89.29 CHRONIC RIGHT SHOULDER PAIN: ICD-10-CM

## 2021-12-15 DIAGNOSIS — G47.09 OTHER INSOMNIA: ICD-10-CM

## 2021-12-15 DIAGNOSIS — Z01.419 WELL FEMALE EXAM WITH ROUTINE GYNECOLOGICAL EXAM: ICD-10-CM

## 2021-12-15 PROCEDURE — 87624 HPV HI-RISK TYP POOLED RSLT: CPT

## 2021-12-15 PROCEDURE — 99214 OFFICE O/P EST MOD 30 MIN: CPT | Performed by: FAMILY MEDICINE

## 2021-12-15 PROCEDURE — 88175 CYTOPATH C/V AUTO FLUID REDO: CPT

## 2021-12-15 RX ORDER — TEMAZEPAM 30 MG/1
30 CAPSULE ORAL NIGHTLY PRN
Qty: 30 CAPSULE | Refills: 0 | Status: SHIPPED | OUTPATIENT
Start: 2021-12-15 | End: 2021-12-16 | Stop reason: SDUPTHER

## 2021-12-15 ASSESSMENT — FIBROSIS 4 INDEX: FIB4 SCORE: 1.56

## 2021-12-15 ASSESSMENT — PATIENT HEALTH QUESTIONNAIRE - PHQ9: CLINICAL INTERPRETATION OF PHQ2 SCORE: 0

## 2021-12-15 NOTE — PROGRESS NOTES
Subjective:     CC:   Chief Complaint   Patient presents with   • Annual Exam       HPI:   Shawn presents today for a female exam.  Patient is also here due to the fact that she is having problems with her right shoulder she stopped doing  her upper body strengthening exercises for about a year due to the shoulder pain.  Patient is having some left shoulder pain but the right is worse.  Due to the fact that she has her right shoulder pain patient is having a hard time sleeping at night she is requesting a repeat fill of the Restoril which she only takes as needed and it is not night nightly.     Past Medical History:   Diagnosis Date   • Chronic low back pain    • MVP (mitral valve prolapse)    • Pericarditis        Social History     Tobacco Use   • Smoking status: Former Smoker     Years: 8.00     Quit date: 1988     Years since quittin.5   • Smokeless tobacco: Never Used   Vaping Use   • Vaping Use: Never used   Substance Use Topics   • Alcohol use: Yes     Alcohol/week: 0.0 oz     Comment: rare   • Drug use: No       Current Outpatient Medications Ordered in Epic   Medication Sig Dispense Refill   • temazepam (RESTORIL) 30 MG capsule Take 1 Capsule by mouth at bedtime as needed for Sleep for up to 20 days. 30 Capsule 0   • metronidazole (METROGEL) 0.75 % gel APPLY A THIN LAYER  TOPICALLY TO AFFECTED AREA  TWICE DAILY 45 g 5   • omeprazole (PRILOSEC) 40 MG delayed-release capsule TAKE 1 CAPSULE BY MOUTH  ONCE DAILY 90 Cap 1   • gabapentin (NEURONTIN) 100 MG Cap Take 1-4 Caps by mouth at bedtime as needed (pain). 120 Cap 11   • cyclosporin (RESTASIS) 0.05 % ophthalmic emulsion Place 1 Drop in both eyes 2 times a day. [facility]ividua vials 180 Each 3   • estradiol (ESTRACE) 1 MG Tab TAKE 1 TABLET BY MOUTH EVERY DAY 90 Tab 3     No current Epic-ordered facility-administered medications on file.       Allergies:  Other drug    Health Maintenance: Completed    ROS:  Gen: no fevers/chills  Eyes: no changes in  "vision  ENT: no sore throat, no hearing loss, no bloody nose  Pulm: no sob, no cough  CV: no chest pain, no palpitations  GI: no nausea/vomiting, no diarrhea  : no dysuria  MSk: no myalgias  Skin: no rash  Neuro: no headaches, no numbness/tingling  Heme/Lymph: no easy bruising    Objective:     Exam:  /76   Pulse 69   Temp 37.1 °C (98.7 °F)   Resp 16   Ht 1.6 m (5' 3\")   Wt 69.2 kg (152 lb 9.6 oz)   SpO2 97%   BMI 27.03 kg/m²  Body mass index is 27.03 kg/m².    Gen: Alert and oriented, No apparent distress.  Skin: Warm and dry.  No obvious lesions.  Eyes: Sclera wnl Pupils normal in size    Breasts: Patient does have some fibrocystic changes that are bilateral.  No axillary  tenderness or fullness noted no skin retraction or dimpling.  No nipple discharge noted  Lungs: Normal effort, CTA bilaterally, no wheezes, rhonchi, or rales  CV: Regular rate and rhythm. No murmurs, rubs, or gallops.  ABD: Soft non-tender no organomegaly  Genitalia: External genitalia is within normal limits insertion of speculum vaginal area is atrophic cervix was noted Pap done uterus none present I do not feel any adnexal tenderness or fullness rectal exam guaiac negative.  Musculoskeletal: Normal gait. No extremity cyanosis, clubbing, or edema.  Patient is able to raise arms above her head bilaterally and touch her fingers to her neck and also mid back  Neuro: Oriented to person, place and time  Psych: Mood is wnl     A chaperone was offered to the patient during today's exam. Chaperone name: Tita Perdue MA was present.      Assessment & Plan:     65 y.o. female with the following -     1. Chronic right shoulder pain  We will go ahead and do a right shoulder x-ray also recommend referral to Ortho patient was agreeable with this plan this is a chronic problem  - DX-SHOULDER 2+ RIGHT; Future    2. Well female exam with routine gynecological exam  Patient to do her monthly self breast exams if she feels anything of concern " patient should follow-up with me.  I discussed with patient that another Pap should be done in about 5 years assuming this Pap is normal    3. Other insomnia  Patient knows that she should not take this nightly and would her right shoulder pain she needs to discuss this with Ortho if they recommend another medication she should stop to Restoril patient expressed understanding of this.  This is a chronic problem  - temazepam (RESTORIL) 30 MG capsule; Take 1 Capsule by mouth at bedtime as needed for Sleep for up to 20 days.  Dispense: 30 Capsule; Refill: 0       Return in about 1 year (around 12/15/2022), or if symptoms worsen or fail to improve.    Please note that this dictation was created using voice recognition software. I have made every reasonable attempt to correct obvious errors, but I expect that there are errors of grammar and possibly content that I did not discover before finalizing the note.

## 2021-12-16 ENCOUNTER — TELEPHONE (OUTPATIENT)
Dept: MEDICAL GROUP | Facility: PHYSICIAN GROUP | Age: 66
End: 2021-12-16

## 2021-12-16 DIAGNOSIS — Z01.419 WELL FEMALE EXAM WITH ROUTINE GYNECOLOGICAL EXAM: ICD-10-CM

## 2021-12-16 DIAGNOSIS — G47.09 OTHER INSOMNIA: ICD-10-CM

## 2021-12-16 LAB
CYTOLOGY REG CYTOL: NORMAL
HPV HR 12 DNA CVX QL NAA+PROBE: NEGATIVE
HPV16 DNA SPEC QL NAA+PROBE: NEGATIVE
HPV18 DNA SPEC QL NAA+PROBE: NEGATIVE
SPECIMEN SOURCE: NORMAL

## 2021-12-16 RX ORDER — TEMAZEPAM 30 MG/1
30 CAPSULE ORAL NIGHTLY PRN
Qty: 30 CAPSULE | Refills: 0 | Status: SHIPPED | OUTPATIENT
Start: 2021-12-16 | End: 2022-02-14

## 2021-12-16 NOTE — TELEPHONE ENCOUNTER
[facility] - [place], [place] - [address] [facility], [address]  [address] [facility], [address]  [facility] [address]  Phone: [phone] Fax: [phone]    Pharmacy is asking to verify rx for temazepam that states to take for up to 20 days and has #30

## 2021-12-22 ENCOUNTER — HOSPITAL ENCOUNTER (OUTPATIENT)
Dept: RADIOLOGY | Facility: MEDICAL CENTER | Age: 66
End: 2021-12-22
Attending: FAMILY MEDICINE
Payer: MEDICARE

## 2021-12-22 DIAGNOSIS — M25.511 CHRONIC RIGHT SHOULDER PAIN: ICD-10-CM

## 2021-12-22 DIAGNOSIS — G89.29 CHRONIC RIGHT SHOULDER PAIN: ICD-10-CM

## 2021-12-22 PROCEDURE — 73030 X-RAY EXAM OF SHOULDER: CPT | Mod: RT

## 2022-02-10 DIAGNOSIS — M25.50 ARTHRALGIA, UNSPECIFIED JOINT: ICD-10-CM

## 2022-02-23 ENCOUNTER — HOSPITAL ENCOUNTER (OUTPATIENT)
Dept: LAB | Facility: MEDICAL CENTER | Age: 67
End: 2022-02-23
Attending: FAMILY MEDICINE
Payer: MEDICARE

## 2022-02-23 DIAGNOSIS — M25.50 ARTHRALGIA, UNSPECIFIED JOINT: ICD-10-CM

## 2022-02-23 LAB
ALBUMIN SERPL BCP-MCNC: 4.5 G/DL (ref 3.2–4.9)
ALBUMIN/GLOB SERPL: 1.7 G/DL
ALP SERPL-CCNC: 73 U/L (ref 30–99)
ALT SERPL-CCNC: 11 U/L (ref 2–50)
ANION GAP SERPL CALC-SCNC: 11 MMOL/L (ref 7–16)
AST SERPL-CCNC: 17 U/L (ref 12–45)
BASOPHILS # BLD AUTO: 0.8 % (ref 0–1.8)
BASOPHILS # BLD: 0.04 K/UL (ref 0–0.12)
BILIRUB SERPL-MCNC: 0.2 MG/DL (ref 0.1–1.5)
BUN SERPL-MCNC: 23 MG/DL (ref 8–22)
CALCIUM SERPL-MCNC: 9.8 MG/DL (ref 8.5–10.5)
CHLORIDE SERPL-SCNC: 103 MMOL/L (ref 96–112)
CO2 SERPL-SCNC: 25 MMOL/L (ref 20–33)
CREAT SERPL-MCNC: 0.65 MG/DL (ref 0.5–1.4)
CRP SERPL HS-MCNC: <0.3 MG/DL (ref 0–0.75)
EOSINOPHIL # BLD AUTO: 0.06 K/UL (ref 0–0.51)
EOSINOPHIL NFR BLD: 1.1 % (ref 0–6.9)
ERYTHROCYTE [DISTWIDTH] IN BLOOD BY AUTOMATED COUNT: 45.3 FL (ref 35.9–50)
ERYTHROCYTE [SEDIMENTATION RATE] IN BLOOD BY WESTERGREN METHOD: 10 MM/HOUR (ref 0–25)
FASTING STATUS PATIENT QL REPORTED: NORMAL
GLOBULIN SER CALC-MCNC: 2.7 G/DL (ref 1.9–3.5)
GLUCOSE SERPL-MCNC: 91 MG/DL (ref 65–99)
HCT VFR BLD AUTO: 41.3 % (ref 37–47)
HGB BLD-MCNC: 13.7 G/DL (ref 12–16)
IMM GRANULOCYTES # BLD AUTO: 0.02 K/UL (ref 0–0.11)
IMM GRANULOCYTES NFR BLD AUTO: 0.4 % (ref 0–0.9)
LYMPHOCYTES # BLD AUTO: 1.61 K/UL (ref 1–4.8)
LYMPHOCYTES NFR BLD: 30.5 % (ref 22–41)
MCH RBC QN AUTO: 30.9 PG (ref 27–33)
MCHC RBC AUTO-ENTMCNC: 33.2 G/DL (ref 33.6–35)
MCV RBC AUTO: 93.2 FL (ref 81.4–97.8)
MONOCYTES # BLD AUTO: 0.3 K/UL (ref 0–0.85)
MONOCYTES NFR BLD AUTO: 5.7 % (ref 0–13.4)
NEUTROPHILS # BLD AUTO: 3.25 K/UL (ref 2–7.15)
NEUTROPHILS NFR BLD: 61.5 % (ref 44–72)
NRBC # BLD AUTO: 0 K/UL
NRBC BLD-RTO: 0 /100 WBC
PLATELET # BLD AUTO: 284 K/UL (ref 164–446)
PMV BLD AUTO: 12.3 FL (ref 9–12.9)
POTASSIUM SERPL-SCNC: 3.5 MMOL/L (ref 3.6–5.5)
PROT SERPL-MCNC: 7.2 G/DL (ref 6–8.2)
RBC # BLD AUTO: 4.43 M/UL (ref 4.2–5.4)
RHEUMATOID FACT SER IA-ACNC: <10 IU/ML (ref 0–14)
SODIUM SERPL-SCNC: 139 MMOL/L (ref 135–145)
WBC # BLD AUTO: 5.3 K/UL (ref 4.8–10.8)

## 2022-02-23 PROCEDURE — 36415 COLL VENOUS BLD VENIPUNCTURE: CPT

## 2022-02-23 PROCEDURE — 86140 C-REACTIVE PROTEIN: CPT

## 2022-02-23 PROCEDURE — 86038 ANTINUCLEAR ANTIBODIES: CPT

## 2022-02-23 PROCEDURE — 86431 RHEUMATOID FACTOR QUANT: CPT

## 2022-02-23 PROCEDURE — 85025 COMPLETE CBC W/AUTO DIFF WBC: CPT

## 2022-02-23 PROCEDURE — 85652 RBC SED RATE AUTOMATED: CPT

## 2022-02-23 PROCEDURE — 80053 COMPREHEN METABOLIC PANEL: CPT

## 2022-02-23 PROCEDURE — 86200 CCP ANTIBODY: CPT

## 2022-02-25 LAB
CCP IGG SERPL-ACNC: 5 UNITS (ref 0–19)
NUCLEAR IGG SER QL IA: NORMAL

## 2022-02-28 DIAGNOSIS — E87.6 HYPOKALEMIA: ICD-10-CM

## 2022-03-07 DIAGNOSIS — H04.129 DRY EYE: ICD-10-CM

## 2022-03-07 DIAGNOSIS — N95.9 POSTMENOPAUSAL SYMPTOMS: ICD-10-CM

## 2022-03-08 RX ORDER — CYCLOSPORINE 0.5 MG/ML
1 EMULSION OPHTHALMIC 2 TIMES DAILY
Qty: 5.5 ML | Refills: 2 | Status: SHIPPED | OUTPATIENT
Start: 2022-03-08 | End: 2022-03-11 | Stop reason: SDUPTHER

## 2022-03-08 RX ORDER — OMEPRAZOLE 40 MG/1
40 CAPSULE, DELAYED RELEASE ORAL DAILY
Qty: 90 CAPSULE | Refills: 1 | Status: SHIPPED | OUTPATIENT
Start: 2022-03-08 | End: 2022-09-02

## 2022-03-08 RX ORDER — IBUPROFEN 800 MG/1
800 TABLET ORAL EVERY 8 HOURS PRN
Qty: 60 TABLET | Refills: 2 | Status: SHIPPED | OUTPATIENT
Start: 2022-03-08 | End: 2022-03-09 | Stop reason: SDUPTHER

## 2022-03-08 RX ORDER — ESTRADIOL 1 MG/1
TABLET ORAL
Qty: 90 TABLET | Refills: 3 | Status: SHIPPED | OUTPATIENT
Start: 2022-03-08

## 2022-03-09 RX ORDER — IBUPROFEN 800 MG/1
800 TABLET ORAL EVERY 8 HOURS PRN
Qty: 60 TABLET | Refills: 2 | Status: SHIPPED | OUTPATIENT
Start: 2022-03-09 | End: 2022-03-11 | Stop reason: SDUPTHER

## 2022-03-10 DIAGNOSIS — H04.129 DRY EYE: ICD-10-CM

## 2022-03-11 DIAGNOSIS — H04.129 DRY EYE: ICD-10-CM

## 2022-03-11 RX ORDER — CYCLOSPORINE 0.5 MG/ML
1 EMULSION OPHTHALMIC 2 TIMES DAILY
Qty: 5.5 ML | Refills: 2 | Status: SHIPPED | OUTPATIENT
Start: 2022-03-11 | End: 2022-03-11 | Stop reason: SDUPTHER

## 2022-03-11 RX ORDER — CYCLOSPORINE 0.5 MG/ML
1 EMULSION OPHTHALMIC 2 TIMES DAILY
Qty: 5.5 ML | Refills: 2 | Status: SHIPPED | OUTPATIENT
Start: 2022-03-11 | End: 2022-05-17 | Stop reason: SDUPTHER

## 2022-03-11 RX ORDER — CYCLOBENZAPRINE HCL 10 MG
10 TABLET ORAL 3 TIMES DAILY PRN
Qty: 30 TABLET | Refills: 0 | Status: SHIPPED | OUTPATIENT
Start: 2022-03-11 | End: 2024-01-16

## 2022-03-11 RX ORDER — IBUPROFEN 800 MG/1
800 TABLET ORAL EVERY 8 HOURS PRN
Qty: 270 TABLET | Refills: 0 | Status: SHIPPED | OUTPATIENT
Start: 2022-03-11 | End: 2022-06-09

## 2022-03-11 RX ORDER — CYCLOSPORINE 0.5 MG/ML
1 EMULSION OPHTHALMIC 2 TIMES DAILY
Qty: 5.5 ML | Refills: 2 | OUTPATIENT
Start: 2022-03-11

## 2022-05-17 ENCOUNTER — PATIENT MESSAGE (OUTPATIENT)
Dept: MEDICAL GROUP | Facility: PHYSICIAN GROUP | Age: 67
End: 2022-05-17
Payer: MEDICARE

## 2022-05-17 DIAGNOSIS — H04.129 DRY EYE: ICD-10-CM

## 2022-05-17 DIAGNOSIS — E78.2 MIXED HYPERLIPIDEMIA: ICD-10-CM

## 2022-05-17 RX ORDER — CYCLOSPORINE 0.5 MG/ML
1 EMULSION OPHTHALMIC 2 TIMES DAILY
Qty: 5.5 ML | Refills: 2 | Status: SHIPPED | OUTPATIENT
Start: 2022-05-17 | End: 2022-11-09 | Stop reason: SDUPTHER

## 2022-05-17 RX ORDER — SIMVASTATIN 20 MG
20 TABLET ORAL EVERY EVENING
Qty: 90 TABLET | Refills: 0 | Status: SHIPPED | OUTPATIENT
Start: 2022-05-17 | End: 2022-08-17

## 2022-05-17 NOTE — PATIENT COMMUNICATION
Received request via: Patient    Was the patient seen in the last year in this department? Yes  Last OV 12/15/21  Last labs 9/14/21  Lab Results   Component Value Date/Time    CHOLSTRLTOT 173 09/14/2021 09:07 AM    LDL 88 09/14/2021 09:07 AM    HDL 66 09/14/2021 09:07 AM    TRIGLYCERIDE 95 09/14/2021 09:07 AM       Does the patient have an active prescription (recently filled or refills available) for medication(s) requested? No    Requested Prescriptions     Pending Prescriptions Disp Refills   • simvastatin (ZOCOR) 20 MG Tab 90 Tablet 0     Sig: Take 1 Tablet by mouth every evening.

## 2022-05-27 RX ORDER — LOTEPREDNOL ETABONATE 5 MG/ML
SUSPENSION/ DROPS OPHTHALMIC
COMMUNITY
Start: 2022-03-11 | End: 2022-12-06

## 2022-08-16 DIAGNOSIS — E78.2 MIXED HYPERLIPIDEMIA: ICD-10-CM

## 2022-08-17 DIAGNOSIS — E78.2 MIXED HYPERLIPIDEMIA: ICD-10-CM

## 2022-08-17 RX ORDER — SIMVASTATIN 20 MG
20 TABLET ORAL EVERY EVENING
Qty: 90 TABLET | Refills: 0 | Status: SHIPPED | OUTPATIENT
Start: 2022-08-17 | End: 2022-11-11

## 2022-08-26 ENCOUNTER — HOSPITAL ENCOUNTER (OUTPATIENT)
Dept: LAB | Facility: MEDICAL CENTER | Age: 67
End: 2022-08-26
Attending: FAMILY MEDICINE
Payer: MEDICARE

## 2022-08-26 DIAGNOSIS — E78.2 MIXED HYPERLIPIDEMIA: ICD-10-CM

## 2022-08-26 LAB
ALBUMIN SERPL BCP-MCNC: 4.4 G/DL (ref 3.2–4.9)
ALBUMIN/GLOB SERPL: 1.7 G/DL
ALP SERPL-CCNC: 66 U/L (ref 30–99)
ALT SERPL-CCNC: 17 U/L (ref 2–50)
ANION GAP SERPL CALC-SCNC: 10 MMOL/L (ref 7–16)
AST SERPL-CCNC: 18 U/L (ref 12–45)
BILIRUB SERPL-MCNC: 0.4 MG/DL (ref 0.1–1.5)
BUN SERPL-MCNC: 20 MG/DL (ref 8–22)
CALCIUM SERPL-MCNC: 9.4 MG/DL (ref 8.5–10.5)
CHLORIDE SERPL-SCNC: 105 MMOL/L (ref 96–112)
CHOLEST SERPL-MCNC: 178 MG/DL (ref 100–199)
CO2 SERPL-SCNC: 25 MMOL/L (ref 20–33)
CREAT SERPL-MCNC: 0.75 MG/DL (ref 0.5–1.4)
FASTING STATUS PATIENT QL REPORTED: NORMAL
GFR SERPLBLD CREATININE-BSD FMLA CKD-EPI: 87 ML/MIN/1.73 M 2
GLOBULIN SER CALC-MCNC: 2.6 G/DL (ref 1.9–3.5)
GLUCOSE SERPL-MCNC: 86 MG/DL (ref 65–99)
HDLC SERPL-MCNC: 72 MG/DL
LDLC SERPL CALC-MCNC: 96 MG/DL
POTASSIUM SERPL-SCNC: 4.4 MMOL/L (ref 3.6–5.5)
PROT SERPL-MCNC: 7 G/DL (ref 6–8.2)
SODIUM SERPL-SCNC: 140 MMOL/L (ref 135–145)
TRIGL SERPL-MCNC: 52 MG/DL (ref 0–149)

## 2022-08-26 PROCEDURE — 80061 LIPID PANEL: CPT

## 2022-08-26 PROCEDURE — 80053 COMPREHEN METABOLIC PANEL: CPT

## 2022-08-26 PROCEDURE — 36415 COLL VENOUS BLD VENIPUNCTURE: CPT

## 2022-09-02 RX ORDER — OMEPRAZOLE 40 MG/1
40 CAPSULE, DELAYED RELEASE ORAL DAILY
Qty: 90 CAPSULE | Refills: 1 | Status: SHIPPED | OUTPATIENT
Start: 2022-09-02 | End: 2023-03-06

## 2022-09-13 ENCOUNTER — OFFICE VISIT (OUTPATIENT)
Dept: PHYSICAL MEDICINE AND REHAB | Facility: MEDICAL CENTER | Age: 67
End: 2022-09-13
Payer: MEDICARE

## 2022-09-13 VITALS
HEART RATE: 69 BPM | OXYGEN SATURATION: 97 % | WEIGHT: 160.94 LBS | TEMPERATURE: 96.8 F | SYSTOLIC BLOOD PRESSURE: 112 MMHG | HEIGHT: 63 IN | BODY MASS INDEX: 28.52 KG/M2 | DIASTOLIC BLOOD PRESSURE: 68 MMHG

## 2022-09-13 DIAGNOSIS — G89.29 CHRONIC NECK PAIN: ICD-10-CM

## 2022-09-13 DIAGNOSIS — M54.12 CERVICAL RADICULOPATHY: ICD-10-CM

## 2022-09-13 DIAGNOSIS — R20.0 RIGHT ARM NUMBNESS: ICD-10-CM

## 2022-09-13 DIAGNOSIS — M47.812 CERVICAL SPONDYLOSIS: ICD-10-CM

## 2022-09-13 DIAGNOSIS — M79.601 RIGHT ARM PAIN: ICD-10-CM

## 2022-09-13 DIAGNOSIS — M54.2 CHRONIC NECK PAIN: ICD-10-CM

## 2022-09-13 PROCEDURE — 99214 OFFICE O/P EST MOD 30 MIN: CPT | Performed by: PHYSICAL MEDICINE & REHABILITATION

## 2022-09-13 RX ORDER — PREGABALIN 75 MG/1
75 CAPSULE ORAL 3 TIMES DAILY
COMMUNITY
End: 2022-12-06

## 2022-09-13 ASSESSMENT — PAIN SCALES - GENERAL: PAINLEVEL: 7=MODERATE-SEVERE PAIN

## 2022-09-13 ASSESSMENT — PATIENT HEALTH QUESTIONNAIRE - PHQ9
SUM OF ALL RESPONSES TO PHQ QUESTIONS 1-9: 4
5. POOR APPETITE OR OVEREATING: 1 - SEVERAL DAYS
CLINICAL INTERPRETATION OF PHQ2 SCORE: 1

## 2022-09-13 ASSESSMENT — FIBROSIS 4 INDEX: FIB4 SCORE: 1.03

## 2022-09-13 NOTE — PATIENT INSTRUCTIONS
Your procedure will be at the [facility] special procedure suite.    [facility] [place] [address]       PRE-PROCEDURE INSTRUCTIONS  You may take your regular medications except:   No Anti-inflammatories 5 days prior to your procedure. Anti-inflammatories include medicines such as  ibuprofen (Motrin, Advil), Excedrin, Naproxen (Aleve, Anaprox, Naprelan, Naprosyn), Celecoxib (Celebrex), Diclofenac (Voltaren-XR tab), and Meloxicam (Mobic).   You can take the remainder of your pain medications as prescribed.   If you are having a diagnostic procedure such as a medial branch block, do not use her pain meds on the day of the procedure  No Glucophage or Metformin 24 hours before your procedure. You may resume next day after your procedure.  Call the physiatry office if you are taking or prescribed anti-biotics within five days of procedure.  Please ask provider if you are taking any new diabetes medication.  CONTINUE TAKING BLOOD PRESSURE MEDICATIONS AS PRESCRIBED.  Pain medications will not be prescribed on the procedure day. Procedural pain medication may be used by your provider   Call your doctor's office performing the procedure if you have a fever, chills, rash or new illness prior to your procedure    Anticoagulation/antiplatelet medications  No Blood thinning medications such as Coumadin, Xarelto, aspirin or Plavix 5 days prior to procedure unless your doctor said to continue these medications. Call your doctor if a new medication is prescribed in this class.     Restrictions for eating before procedure:   If you are getting procedural sedation, then do not eat to for 8 hours prior to procedure appointment time. Do not drink fluids for four hours prior to your procedure time.   If you are not having procedural sedation, then Skip the meal prior to your procedure. If you have a morning procedure then skip breakfast. If you have an afternoon procedure then skip lunch.   You may drink clear liquids  up to 2 hours prior to your procedure  You must have a  the day of procedure to accompany you home.      POST PROCEDURE INSTRUCTIONS   No heavy lifting, strenuous bending or strenuous exercise for 3 days after your procedure.  No hot tubs, baths, swimming for 3 days after your procedure  You can remove the bandage the day after the procedure.  IF YOU RECEIVED A STEROID INJECTION. PLEASE NOTE THAT THERE MAY BE A DELAY FOR THE INJECTION TO START WORKING, THE DELAY MAY BE UP TO TWO WEEKS. IF YOU HAVE DIABETES, PLEASE NOTE THAT YOUR SUGAR LEVELS MAY BE ELEVATED FOR 1-2 DAYS AFTER A STEROID INJECTION.  THE STEROID MAY CAUSE TEMPORARY SYMPTOMS WHICH USUALLY RESOLVE ON THEIR OWN WITHIN 1 TO 2 DAYS INCLUDING FACIAL FLUSHING OR A FEELING OF WARMTH ON THE FACE, TEMPORARY INCREASES IN BLOOD SUGAR, INSOMNIA, INCREASED HUNGER  IF YOU EXPERIENCE PROLONGED WEAKNESS LONGER THAN ONE DAY, BOWEL OR BLADDER INCONTINENCE THEN PLEASE CALL THE PHYSIATRY OFFICE.  Your leg may feel heavy, weak and numb for up to 1-2 days. Be very careful walking.    You may resume normal activities 3 days after procedure.

## 2022-09-13 NOTE — H&P (VIEW-ONLY)
Follow up patient note  Interventional spine and sports physiatry, Physical medicine rehabilitation      Chief complaint:   Chief Complaint   Patient presents with    Follow-Up     Neck pain          HISTORY    Please see new patient note by Dr Rios,  for more details.     HPI  Patient identification: Shawn Westbrook  1955,   female  With Diagnoses of Cervical radiculopathy, Cervical spondylosis, Chronic neck pain, Right arm pain, and Right arm numbness were pertinent to this visit.     Procedures:  2020 C7-T1 interlaminar epidural steroid injection with 100% improvement in pain and function in regards to the neck and arm pain after the injection follow-up visit on 2020    Acute on chronic right-sided neck pain rating down the right arm with associated tingling sensation 7 out of 10 intensity constant with neck and arm pain intermittent for back pain.    Failed home exercise program.     Medications tried include gabapentin which had to be stopped because of side effects.  Patient is on Lyrica now. She is also use meloxicam and Flexeril.           ROS Red Flags :   Fever, Chills, Sweats: Denies  Involuntary Weight Loss: Denies  Bowel/Bladder Incontinence: Denies  Saddle Anesthesia: Denies        PMHx:   Past Medical History:   Diagnosis Date    Chronic low back pain     Hypertension     MVP (mitral valve prolapse)     Pericarditis        PSHx:   Past Surgical History:   Procedure Laterality Date    ABDOMINAL HYSTERECTOMY TOTAL      cervix remains and one ovary.     ARTHROPLASTY Bilateral     Total knee replacements    LAMINOTOMY      L4-5, L5-S1 fusion    OTHER ORTHOPEDIC SURGERY      spinal fusion    OTHER ORTHOPEDIC SURGERY      right knee replacement       Family history   Family History   Problem Relation Age of Onset    Heart Disease Mother     Heart Disease Father     Cancer Father     Alcohol abuse Father     Diabetes Sister     Diabetes Sister     Breast Cancer Maternal  Grandmother     Dementia Paternal Grandmother          Medications:   Outpatient Medications Marked as Taking for the 9/13/22 encounter (Office Visit) with Marco Rios M.D.   Medication Sig Dispense Refill    pregabalin (LYRICA) 75 MG Cap Take 75 mg by mouth 3 times a day.      omeprazole (PRILOSEC) 40 MG delayed-release capsule TAKE 1 CAPSULE BY MOUTH EVERY DAY 90 Capsule 1    simvastatin (ZOCOR) 20 MG Tab TAKE 1 TABLET BY MOUTH EVERY EVENING 90 Tablet 0    meloxicam (MOBIC) 15 MG tablet 1 tablet PO daily with food. No advil/aleve/motrin/ibuprofen on same day. 60 Tablet 1    loteprednol etabonate (LOTEMAX) 0.5 % ophthalmic suspension INSTILL 1 DROP IN BOTH EYES TWICE DAILY      meloxicam (MOBIC) 15 MG tablet 1 tablet PO daily with food. No advil/aleve/motrin/ibuprofen on same day. 60 Tablet 1    cyclobenzaprine (FLEXERIL) 10 mg Tab Take 1 Tablet by mouth 3 times a day as needed. 30 Tablet 0    estradiol (ESTRACE) 1 MG Tab TAKE 1 TABLET BY MOUTH EVERY DAY 90 Tablet 3        Current Outpatient Medications on File Prior to Visit   Medication Sig Dispense Refill    pregabalin (LYRICA) 75 MG Cap Take 75 mg by mouth 3 times a day.      omeprazole (PRILOSEC) 40 MG delayed-release capsule TAKE 1 CAPSULE BY MOUTH EVERY DAY 90 Capsule 1    simvastatin (ZOCOR) 20 MG Tab TAKE 1 TABLET BY MOUTH EVERY EVENING 90 Tablet 0    meloxicam (MOBIC) 15 MG tablet 1 tablet PO daily with food. No advil/aleve/motrin/ibuprofen on same day. 60 Tablet 1    loteprednol etabonate (LOTEMAX) 0.5 % ophthalmic suspension INSTILL 1 DROP IN BOTH EYES TWICE DAILY      cyclosporin (RESTASIS) 0.05 % ophthalmic emulsion Administer 1 Drop into both eyes 2 times a day. Individua vials 5.5 mL 2    meloxicam (MOBIC) 15 MG tablet 1 tablet PO daily with food. No advil/aleve/motrin/ibuprofen on same day. 60 Tablet 1    cyclobenzaprine (FLEXERIL) 10 mg Tab Take 1 Tablet by mouth 3 times a day as needed. 30 Tablet 0    estradiol (ESTRACE) 1 MG Tab TAKE 1  "TABLET BY MOUTH EVERY DAY 90 Tablet 3    metronidazole (METROGEL) 0.75 % gel APPLY A THIN LAYER  TOPICALLY TO AFFECTED AREA  TWICE DAILY 45 g 5     No current facility-administered medications on file prior to visit.         Allergies:   Allergies   Allergen Reactions    Other Drug Unspecified     Steroid used with a previous epidural caused hot flashes and flushing. Exact one unknown.       Social Hx:   Social History     Socioeconomic History    Marital status:      Spouse name: Not on file    Number of children: Not on file    Years of education: Not on file    Highest education level: Not on file   Occupational History     Employer: OTHER     Comment: retired   Tobacco Use    Smoking status: Former     Years: 8.00     Types: Cigarettes     Quit date: 1988     Years since quittin.3    Smokeless tobacco: Never   Vaping Use    Vaping Use: Never used   Substance and Sexual Activity    Alcohol use: Yes     Alcohol/week: 0.0 oz     Comment: rare    Drug use: No    Sexual activity: Not Currently     Comment:    Other Topics Concern     Service No    Blood Transfusions No    Caffeine Concern No    Occupational Exposure No    Hobby Hazards No    Sleep Concern Yes     Comment: due to pain    Stress Concern Yes    Weight Concern No    Special Diet No    Back Care No    Exercise Yes    Bike Helmet No    Seat Belt Yes    Self-Exams Yes   Social History Narrative    Not on file     Social Determinants of Health     Financial Resource Strain: Not on file   Food Insecurity: Not on file   Transportation Needs: Not on file   Physical Activity: Not on file   Stress: Not on file   Social Connections: Not on file   Intimate Partner Violence: Not on file   Housing Stability: Not on file         EXAMINATION     Physical Exam:   Vitals: /68 (BP Location: Right arm, Patient Position: Sitting, BP Cuff Size: Adult)   Pulse 69   Temp 36 °C (96.8 °F) (Temporal)   Ht 1.6 m (5' 3\")   Wt 73 kg (160 lb " 15 oz)   SpO2 97%     Constitutional:   Body Habitus: Body mass index is 28.51 kg/m².  Cooperation: Fully cooperates with exam  Appearance: Well-groomed no disheveled    Respiratory-  breathing comfortable on room air, no audible wheezing  Cardiovascular- capillary refills less than 2 seconds. No lower extremity edema is noted.   Psychiatric- alert and oriented ×3. Normal affect.    MSK and Neuro: -    Cervical spine  decreased active range of motion with flexion, lateral flexion, and rotation bilaterally.   There is decreased active range of motion with cervical extension.      Palpation:   Tenderness to palpation throughout the cervical spine: negative bilaterally        Cervical spine Special tests  Neuro tension  Spurling's maneuver positive right, negative left           Key points for the international standards for neurological classification of spinal cord injury (ISNCSCI) to light touch.     Dermatome R L   C4 2 2   C5 2 2   C6 1 2   C7 1 2   C8 2 2   T1 2 2   T2 2 2                                         Motor Exam Upper Extremities   ? Myotome R L   Shoulder flexion C5 5 5   Elbow flexion C5 5 5   Wrist extension C6 5 5   Elbow extension C7 5 5   Finger flexion C8 5 5   Finger abduction T1 5 5           MEDICAL DECISION MAKING    DATA    Labs:   Lab Results   Component Value Date/Time    SODIUM 140 08/26/2022 11:58 AM    POTASSIUM 4.4 08/26/2022 11:58 AM    CHLORIDE 105 08/26/2022 11:58 AM    CO2 25 08/26/2022 11:58 AM    GLUCOSE 86 08/26/2022 11:58 AM    BUN 20 08/26/2022 11:58 AM    CREATININE 0.75 08/26/2022 11:58 AM        No results found for: PROTHROMBTM, INR     Lab Results   Component Value Date/Time    WBC 5.3 02/23/2022 01:24 PM    RBC 4.43 02/23/2022 01:24 PM    HEMOGLOBIN 13.7 02/23/2022 01:24 PM    HEMATOCRIT 41.3 02/23/2022 01:24 PM    MCV 93.2 02/23/2022 01:24 PM    MCH 30.9 02/23/2022 01:24 PM    MCHC 33.2 (L) 02/23/2022 01:24 PM    MPV 12.3 02/23/2022 01:24 PM    NEUTSPOLYS 61.50  2022 01:24 PM    LYMPHOCYTES 30.50 2022 01:24 PM    MONOCYTES 5.70 2022 01:24 PM    EOSINOPHILS 1.10 2022 01:24 PM    BASOPHILS 0.80 2022 01:24 PM        Lab Results   Component Value Date/Time    HBA1C 5.6 2014 11:27 AM          Imaging:   I personally reviewed following images    MRI cervical spine   There is mild to moderate central canal stenosis at C5-6 with severe left and moderate right neuroforaminal stenosis.  There is mild central canal stenosis at C6-7 with moderate bilateral neuroforaminal stenosis.  There is facet arthropathy right at C2-3.  Also facet arthropathy right worse than left at C3-4, C4-5, C5-6.    I reviewed the following radiology reports                                 Results for orders placed during the hospital encounter of 20   MR-CERVICAL SPINE-W/O    Impression 1.  Mild spinal canal narrowing at C5-6 and C6-7    2.  Foraminal stenoses at C5-6 and C6-7    3.  Multilevel disc bulge, endplate spurring, and facet arthropathy                     Results for orders placed during the hospital encounter of 20   DX-CERVICAL SPINE-WITH FLEX-EXT   7+    Impression 1.  No compression deformity or acute fracture.    2.  There is degenerative disc disease and facet arthropathy with bilateral osseous neural foraminal narrowing.    3.  No focal instability is noted on flexion extension views.                                                                                     DIAGNOSIS   Visit Diagnoses     ICD-10-CM   1. Cervical radiculopathy  M54.12   2. Cervical spondylosis  M47.812   3. Chronic neck pain  M54.2    G89.29   4. Right arm pain  M79.601   5. Right arm numbness  R20.0         ASSESSMENT and PLAN:     Shawn Westbrook  1955,   female      Shawn was seen today for follow-up.    Diagnoses and all orders for this visit:    Cervical radiculopathy  -     Referral to Physical Medicine Rehab    Cervical spondylosis    Chronic  neck pain    Right arm pain    Right arm numbness    I have ordered a C7-T1 cervical interlaminar    The risks benefits and alternatives to this procedure were discussed and the patient wishes to proceed with the procedure. Risks include but are not limited to damage to surrounding structures, infection, bleeding, worsening of pain which can be permanent, weakness which can be permanent. Benefits include pain relief, improved function. Alternatives includes not doing the procedure.      Medications: Continue Flexeril and Lyrica.  Stop meloxicam 5 days prior to procedure above.    Follow up: after the above procedure    Thank you for allowing me to participate in the care of this patient. If you have any questions please not hesitate to contact me.             Please note that this dictation was created using voice recognition software. I have made every reasonable attempt to correct obvious errors but there may be errors of grammar and content that I may have overlooked prior to finalization of this note.      Marco Rios MD  Interventional Spine and Sports Physiatry  Physical Medicine and Rehabilitation  [facility]

## 2022-09-13 NOTE — PROGRESS NOTES
Follow up patient note  Interventional spine and sports physiatry, Physical medicine rehabilitation      Chief complaint:   Chief Complaint   Patient presents with    Follow-Up     Neck pain          HISTORY    Please see new patient note by Dr Rios,  for more details.     HPI  Patient identification: Shawn Westbrook  1955,   female  With Diagnoses of Cervical radiculopathy, Cervical spondylosis, Chronic neck pain, Right arm pain, and Right arm numbness were pertinent to this visit.     Procedures:  2020 C7-T1 interlaminar epidural steroid injection with 100% improvement in pain and function in regards to the neck and arm pain after the injection follow-up visit on 2020    Acute on chronic right-sided neck pain rating down the right arm with associated tingling sensation 7 out of 10 intensity constant with neck and arm pain intermittent for back pain.    Failed home exercise program.     Medications tried include gabapentin which had to be stopped because of side effects.  Patient is on Lyrica now. She is also use meloxicam and Flexeril.           ROS Red Flags :   Fever, Chills, Sweats: Denies  Involuntary Weight Loss: Denies  Bowel/Bladder Incontinence: Denies  Saddle Anesthesia: Denies        PMHx:   Past Medical History:   Diagnosis Date    Chronic low back pain     Hypertension     MVP (mitral valve prolapse)     Pericarditis        PSHx:   Past Surgical History:   Procedure Laterality Date    ABDOMINAL HYSTERECTOMY TOTAL      cervix remains and one ovary.     ARTHROPLASTY Bilateral     Total knee replacements    LAMINOTOMY      L4-5, L5-S1 fusion    OTHER ORTHOPEDIC SURGERY      spinal fusion    OTHER ORTHOPEDIC SURGERY      right knee replacement       Family history   Family History   Problem Relation Age of Onset    Heart Disease Mother     Heart Disease Father     Cancer Father     Alcohol abuse Father     Diabetes Sister     Diabetes Sister     Breast Cancer Maternal  Grandmother     Dementia Paternal Grandmother          Medications:   Outpatient Medications Marked as Taking for the 9/13/22 encounter (Office Visit) with Marco Rios M.D.   Medication Sig Dispense Refill    pregabalin (LYRICA) 75 MG Cap Take 75 mg by mouth 3 times a day.      omeprazole (PRILOSEC) 40 MG delayed-release capsule TAKE 1 CAPSULE BY MOUTH EVERY DAY 90 Capsule 1    simvastatin (ZOCOR) 20 MG Tab TAKE 1 TABLET BY MOUTH EVERY EVENING 90 Tablet 0    meloxicam (MOBIC) 15 MG tablet 1 tablet PO daily with food. No advil/aleve/motrin/ibuprofen on same day. 60 Tablet 1    loteprednol etabonate (LOTEMAX) 0.5 % ophthalmic suspension INSTILL 1 DROP IN BOTH EYES TWICE DAILY      meloxicam (MOBIC) 15 MG tablet 1 tablet PO daily with food. No advil/aleve/motrin/ibuprofen on same day. 60 Tablet 1    cyclobenzaprine (FLEXERIL) 10 mg Tab Take 1 Tablet by mouth 3 times a day as needed. 30 Tablet 0    estradiol (ESTRACE) 1 MG Tab TAKE 1 TABLET BY MOUTH EVERY DAY 90 Tablet 3        Current Outpatient Medications on File Prior to Visit   Medication Sig Dispense Refill    pregabalin (LYRICA) 75 MG Cap Take 75 mg by mouth 3 times a day.      omeprazole (PRILOSEC) 40 MG delayed-release capsule TAKE 1 CAPSULE BY MOUTH EVERY DAY 90 Capsule 1    simvastatin (ZOCOR) 20 MG Tab TAKE 1 TABLET BY MOUTH EVERY EVENING 90 Tablet 0    meloxicam (MOBIC) 15 MG tablet 1 tablet PO daily with food. No advil/aleve/motrin/ibuprofen on same day. 60 Tablet 1    loteprednol etabonate (LOTEMAX) 0.5 % ophthalmic suspension INSTILL 1 DROP IN BOTH EYES TWICE DAILY      cyclosporin (RESTASIS) 0.05 % ophthalmic emulsion Administer 1 Drop into both eyes 2 times a day. Individua vials 5.5 mL 2    meloxicam (MOBIC) 15 MG tablet 1 tablet PO daily with food. No advil/aleve/motrin/ibuprofen on same day. 60 Tablet 1    cyclobenzaprine (FLEXERIL) 10 mg Tab Take 1 Tablet by mouth 3 times a day as needed. 30 Tablet 0    estradiol (ESTRACE) 1 MG Tab TAKE 1  "TABLET BY MOUTH EVERY DAY 90 Tablet 3    metronidazole (METROGEL) 0.75 % gel APPLY A THIN LAYER  TOPICALLY TO AFFECTED AREA  TWICE DAILY 45 g 5     No current facility-administered medications on file prior to visit.         Allergies:   Allergies   Allergen Reactions    Other Drug Unspecified     Steroid used with a previous epidural caused hot flashes and flushing. Exact one unknown.       Social Hx:   Social History     Socioeconomic History    Marital status:      Spouse name: Not on file    Number of children: Not on file    Years of education: Not on file    Highest education level: Not on file   Occupational History     Employer: OTHER     Comment: retired   Tobacco Use    Smoking status: Former     Years: 8.00     Types: Cigarettes     Quit date: 1988     Years since quittin.3    Smokeless tobacco: Never   Vaping Use    Vaping Use: Never used   Substance and Sexual Activity    Alcohol use: Yes     Alcohol/week: 0.0 oz     Comment: rare    Drug use: No    Sexual activity: Not Currently     Comment:    Other Topics Concern     Service No    Blood Transfusions No    Caffeine Concern No    Occupational Exposure No    Hobby Hazards No    Sleep Concern Yes     Comment: due to pain    Stress Concern Yes    Weight Concern No    Special Diet No    Back Care No    Exercise Yes    Bike Helmet No    Seat Belt Yes    Self-Exams Yes   Social History Narrative    Not on file     Social Determinants of Health     Financial Resource Strain: Not on file   Food Insecurity: Not on file   Transportation Needs: Not on file   Physical Activity: Not on file   Stress: Not on file   Social Connections: Not on file   Intimate Partner Violence: Not on file   Housing Stability: Not on file         EXAMINATION     Physical Exam:   Vitals: /68 (BP Location: Right arm, Patient Position: Sitting, BP Cuff Size: Adult)   Pulse 69   Temp 36 °C (96.8 °F) (Temporal)   Ht 1.6 m (5' 3\")   Wt 73 kg (160 lb " 15 oz)   SpO2 97%     Constitutional:   Body Habitus: Body mass index is 28.51 kg/m².  Cooperation: Fully cooperates with exam  Appearance: Well-groomed no disheveled    Respiratory-  breathing comfortable on room air, no audible wheezing  Cardiovascular- capillary refills less than 2 seconds. No lower extremity edema is noted.   Psychiatric- alert and oriented ×3. Normal affect.    MSK and Neuro: -    Cervical spine  decreased active range of motion with flexion, lateral flexion, and rotation bilaterally.   There is decreased active range of motion with cervical extension.      Palpation:   Tenderness to palpation throughout the cervical spine: negative bilaterally        Cervical spine Special tests  Neuro tension  Spurling's maneuver positive right, negative left           Key points for the international standards for neurological classification of spinal cord injury (ISNCSCI) to light touch.     Dermatome R L   C4 2 2   C5 2 2   C6 1 2   C7 1 2   C8 2 2   T1 2 2   T2 2 2                                         Motor Exam Upper Extremities   ? Myotome R L   Shoulder flexion C5 5 5   Elbow flexion C5 5 5   Wrist extension C6 5 5   Elbow extension C7 5 5   Finger flexion C8 5 5   Finger abduction T1 5 5           MEDICAL DECISION MAKING    DATA    Labs:   Lab Results   Component Value Date/Time    SODIUM 140 08/26/2022 11:58 AM    POTASSIUM 4.4 08/26/2022 11:58 AM    CHLORIDE 105 08/26/2022 11:58 AM    CO2 25 08/26/2022 11:58 AM    GLUCOSE 86 08/26/2022 11:58 AM    BUN 20 08/26/2022 11:58 AM    CREATININE 0.75 08/26/2022 11:58 AM        No results found for: PROTHROMBTM, INR     Lab Results   Component Value Date/Time    WBC 5.3 02/23/2022 01:24 PM    RBC 4.43 02/23/2022 01:24 PM    HEMOGLOBIN 13.7 02/23/2022 01:24 PM    HEMATOCRIT 41.3 02/23/2022 01:24 PM    MCV 93.2 02/23/2022 01:24 PM    MCH 30.9 02/23/2022 01:24 PM    MCHC 33.2 (L) 02/23/2022 01:24 PM    MPV 12.3 02/23/2022 01:24 PM    NEUTSPOLYS 61.50  2022 01:24 PM    LYMPHOCYTES 30.50 2022 01:24 PM    MONOCYTES 5.70 2022 01:24 PM    EOSINOPHILS 1.10 2022 01:24 PM    BASOPHILS 0.80 2022 01:24 PM        Lab Results   Component Value Date/Time    HBA1C 5.6 2014 11:27 AM          Imaging:   I personally reviewed following images    MRI cervical spine   There is mild to moderate central canal stenosis at C5-6 with severe left and moderate right neuroforaminal stenosis.  There is mild central canal stenosis at C6-7 with moderate bilateral neuroforaminal stenosis.  There is facet arthropathy right at C2-3.  Also facet arthropathy right worse than left at C3-4, C4-5, C5-6.    I reviewed the following radiology reports                                 Results for orders placed during the hospital encounter of 20   MR-CERVICAL SPINE-W/O    Impression 1.  Mild spinal canal narrowing at C5-6 and C6-7    2.  Foraminal stenoses at C5-6 and C6-7    3.  Multilevel disc bulge, endplate spurring, and facet arthropathy                     Results for orders placed during the hospital encounter of 20   DX-CERVICAL SPINE-WITH FLEX-EXT   7+    Impression 1.  No compression deformity or acute fracture.    2.  There is degenerative disc disease and facet arthropathy with bilateral osseous neural foraminal narrowing.    3.  No focal instability is noted on flexion extension views.                                                                                     DIAGNOSIS   Visit Diagnoses     ICD-10-CM   1. Cervical radiculopathy  M54.12   2. Cervical spondylosis  M47.812   3. Chronic neck pain  M54.2    G89.29   4. Right arm pain  M79.601   5. Right arm numbness  R20.0         ASSESSMENT and PLAN:     Shawn Westbrook  1955,   female      Shawn was seen today for follow-up.    Diagnoses and all orders for this visit:    Cervical radiculopathy  -     Referral to Physical Medicine Rehab    Cervical spondylosis    Chronic  neck pain    Right arm pain    Right arm numbness    I have ordered a C7-T1 cervical interlaminar    The risks benefits and alternatives to this procedure were discussed and the patient wishes to proceed with the procedure. Risks include but are not limited to damage to surrounding structures, infection, bleeding, worsening of pain which can be permanent, weakness which can be permanent. Benefits include pain relief, improved function. Alternatives includes not doing the procedure.      Medications: Continue Flexeril and Lyrica.  Stop meloxicam 5 days prior to procedure above.    Follow up: after the above procedure    Thank you for allowing me to participate in the care of this patient. If you have any questions please not hesitate to contact me.             Please note that this dictation was created using voice recognition software. I have made every reasonable attempt to correct obvious errors but there may be errors of grammar and content that I may have overlooked prior to finalization of this note.      Marco Rios MD  Interventional Spine and Sports Physiatry  Physical Medicine and Rehabilitation  [facility]

## 2022-09-27 ENCOUNTER — HOSPITAL ENCOUNTER (OUTPATIENT)
Facility: REHABILITATION | Age: 67
End: 2022-09-27
Attending: PHYSICAL MEDICINE & REHABILITATION | Admitting: PHYSICAL MEDICINE & REHABILITATION
Payer: MEDICARE

## 2022-09-27 ENCOUNTER — APPOINTMENT (OUTPATIENT)
Dept: RADIOLOGY | Facility: REHABILITATION | Age: 67
End: 2022-09-27
Attending: PHYSICAL MEDICINE & REHABILITATION
Payer: MEDICARE

## 2022-09-27 VITALS
HEART RATE: 70 BPM | BODY MASS INDEX: 28.67 KG/M2 | TEMPERATURE: 98.7 F | WEIGHT: 161.82 LBS | RESPIRATION RATE: 16 BRPM | DIASTOLIC BLOOD PRESSURE: 72 MMHG | OXYGEN SATURATION: 95 % | HEIGHT: 63 IN | SYSTOLIC BLOOD PRESSURE: 107 MMHG

## 2022-09-27 PROCEDURE — 700111 HCHG RX REV CODE 636 W/ 250 OVERRIDE (IP)

## 2022-09-27 PROCEDURE — 62321 NJX INTERLAMINAR CRV/THRC: CPT

## 2022-09-27 PROCEDURE — 700117 HCHG RX CONTRAST REV CODE 255

## 2022-09-27 RX ORDER — DEXAMETHASONE SODIUM PHOSPHATE 10 MG/ML
INJECTION, SOLUTION INTRAMUSCULAR; INTRAVENOUS
Status: COMPLETED
Start: 2022-09-27 | End: 2022-09-27

## 2022-09-27 RX ORDER — LIDOCAINE HYDROCHLORIDE 10 MG/ML
INJECTION, SOLUTION EPIDURAL; INFILTRATION; INTRACAUDAL; PERINEURAL
Status: COMPLETED
Start: 2022-09-27 | End: 2022-09-27

## 2022-09-27 RX ADMIN — DEXAMETHASONE SODIUM PHOSPHATE 10 MG: 10 INJECTION INTRAMUSCULAR; INTRAVENOUS at 10:16

## 2022-09-27 RX ADMIN — LIDOCAINE HYDROCHLORIDE 10 ML: 10 INJECTION, SOLUTION EPIDURAL; INFILTRATION; INTRACAUDAL; PERINEURAL at 10:14

## 2022-09-27 RX ADMIN — IOHEXOL 5 ML: 240 INJECTION, SOLUTION INTRATHECAL; INTRAVASCULAR; INTRAVENOUS; ORAL at 10:15

## 2022-09-27 ASSESSMENT — FIBROSIS 4 INDEX: FIB4 SCORE: 1.03

## 2022-09-27 ASSESSMENT — PAIN DESCRIPTION - PAIN TYPE
TYPE: CHRONIC PAIN

## 2022-09-27 NOTE — OP REPORT
Date of Service: 9/27/2022    Physician/s: Marco Rios MD    Pre-operative Diagnosis: Cervical radiculopathy    Post-operative Diagnosis: Cervical radiculopathy    Procedure: C7-T1  Cervical interlaminar epidural steroid injection    Description of procedure:    The risks, benefits, and alternatives of the procedure were reviewed and discussed with the patient.  Written informed consent was freely obtained. A pre-procedural time-out was conducted by the physician verifying patient’s identity, procedure to be performed, procedure site and side, and allergy verification. Appropriate equipment was determined to be in place for the procedure.         The patient's vital signs were carefully monitored before, throughout, and after the procedure.     In the fluoroscopy suite the patient was placed in a prone position, a pillow placed underneath their chest. The skin was prepped and draped in the usual sterile fashion. The fluoroscope was placed over the cervical neck at the appropriate injection AP angle view, and the target for injection was marked. A 25g needle was placed into the marked site, and approx 2cc of 1% Lidocaine was injected subcutaneously into the epidermal and dermal layers. The needle was removed. A 20g Tuohy needle was then placed and advanced under fluoroscopic guidance into the  C7-T1 interlaminar space at both the initial position AP view and contralateral oblique at a lateral view to ensure proper location of the needle tip at all times.  The needle was advanced with fluoroscopic guidance to the superior aspect of the T1 lamina.  Then a contralateral oblique view was used to advance the needle slightly towards the epidural space, A loss-of-resistance technique was used to guide the needle into the epidural space in a lateral fluoroscopic view and confirmed with loss of resistance with sterile normal saline. contrast dye was used to highlight the epidural space spread while the fluoroscope was  running live.  Multiple views were used to confirm epidural flow.  Following negative aspiration, 1mL of 10mg/mL of dexamethasone followed by 2 mL of sterile saline . The needle was removed intact after restyleted. The patient's back was covered with a 4x4 gauze, the area was cleansed with sterile normal saline, and a dressing was applied. There were no complications noted.     The patient was then evaluated post-procedure, and was hemodynamically stable prior to leaving the post-operative care unit.     Follow-up as scheduled    Marco Rios MD  Interventional Spine and Pain  Physical Medicine and Rehabilitation  [facility]        CPT  interlaminar cervical/thoracic epidural: [id number]

## 2022-09-27 NOTE — INTERVAL H&P NOTE
H&P reviewed. The patient was examined and there are no changes to the H&P     Lungs clear to auscultation bilaterally.  No abdominal tenderness.  Heart regular rate and rhythm.  Vitals reviewed.    Proceed with the originally scheduled procedure.  The risks, benefits and alternatives were discussed.  The patient understands these.    Pre-Op Diagnosis Codes:     * Cervical radiculopathy [M54.12]  Procedure(s):  C7-T1 interlaminar epidural steroid injection    Marco Rios MD  Physical Medicine and Rehabilitation  Interventional Spine and Sports Physiatry  [facility]

## 2022-09-27 NOTE — PROGRESS NOTES
0920 Pt admitted to Pre Procedure area.  Consent signed and post op instructions reviewed with pt, all questions answered.    0953Dr. Rios in to see pt.       1019 Pt received to Post Procedure area.   Pt provided with snack.    1020 Pt seen by Dr. Rios post procedure, orders received for discharge.    1040 Pt ambulated without difficulty and placed in passenger seat, accompanied to car.  Discharged to designated .

## 2022-09-29 ENCOUNTER — TELEPHONE (OUTPATIENT)
Dept: PHYSICAL MEDICINE AND REHAB | Facility: MEDICAL CENTER | Age: 67
End: 2022-09-29
Payer: MEDICARE

## 2022-09-29 NOTE — TELEPHONE ENCOUNTER
Phone Number Called: [phone]    Call outcome: Left detailed message for patient. Informed to call back with any additional questions.    Message: In regards to her SP that was done with Dr. Rios dated 9/27/22.

## 2022-11-03 ENCOUNTER — PATIENT MESSAGE (OUTPATIENT)
Dept: HEALTH INFORMATION MANAGEMENT | Facility: OTHER | Age: 67
End: 2022-11-03

## 2022-11-07 ENCOUNTER — OFFICE VISIT (OUTPATIENT)
Dept: PHYSICAL MEDICINE AND REHAB | Facility: MEDICAL CENTER | Age: 67
End: 2022-11-07
Payer: MEDICARE

## 2022-11-07 VITALS
BODY MASS INDEX: 27.58 KG/M2 | DIASTOLIC BLOOD PRESSURE: 72 MMHG | OXYGEN SATURATION: 95 % | TEMPERATURE: 96.8 F | WEIGHT: 155.65 LBS | HEART RATE: 88 BPM | HEIGHT: 63 IN | SYSTOLIC BLOOD PRESSURE: 118 MMHG

## 2022-11-07 DIAGNOSIS — M47.812 CERVICAL SPONDYLOSIS: ICD-10-CM

## 2022-11-07 DIAGNOSIS — M54.12 CERVICAL RADICULOPATHY: ICD-10-CM

## 2022-11-07 DIAGNOSIS — M48.02 CERVICAL SPINAL STENOSIS: ICD-10-CM

## 2022-11-07 DIAGNOSIS — G56.01 RIGHT CARPAL TUNNEL SYNDROME: ICD-10-CM

## 2022-11-07 PROCEDURE — 99214 OFFICE O/P EST MOD 30 MIN: CPT | Performed by: PHYSICAL MEDICINE & REHABILITATION

## 2022-11-07 ASSESSMENT — FIBROSIS 4 INDEX: FIB4 SCORE: 1.03

## 2022-11-07 ASSESSMENT — PATIENT HEALTH QUESTIONNAIRE - PHQ9: CLINICAL INTERPRETATION OF PHQ2 SCORE: 0

## 2022-11-07 ASSESSMENT — PAIN SCALES - GENERAL: PAINLEVEL: 7=MODERATE-SEVERE PAIN

## 2022-11-07 NOTE — PROGRESS NOTES
Follow up patient note  Interventional spine and sports physiatry, Physical medicine rehabilitation      Chief complaint:   Chief Complaint   Patient presents with    Follow-Up     Neck pain          HISTORY    Please see new patient note by Dr Rios,  for more details.     HPI  Patient identification: Shawn Westbrook  1955,   female  With Diagnoses of Cervical radiculopathy, Cervical spinal stenosis, Cervical spondylosis, and Right carpal tunnel syndrome were pertinent to this visit.     Procedures:  2020 C7-T1 interlaminar epidural steroid injection with 100% improvement in pain and function in regards to the neck and arm pain after the injection follow-up visit on 2020 C7-T1 cervical interlaminar epidural steroid injection initially with 60% improvement but this only lasted for a few weeks    Patient had temporary improvement after the epidural steroid injection but the pain is returned in the right side of her neck rating down the left arm with associated aching numbness tingling burning sensation as well as numbness tingling burning in the right hand.  7 out of 10 in intensity.      Failed home exercise program.     Medications tried include gabapentin which had to be stopped because of side effects.  Patient is on Lyrica now. She is also use meloxicam and Flexeril.           ROS Red Flags :   Fever, Chills, Sweats: Denies  Involuntary Weight Loss: Denies  Bowel/Bladder Incontinence: Denies  Saddle Anesthesia: Denies        PMHx:   Past Medical History:   Diagnosis Date    Chronic low back pain     Hypertension     MVP (mitral valve prolapse)     Pericarditis        PSHx:   Past Surgical History:   Procedure Laterality Date    [place] INJ CERV/THORAC,W/ IMAGING N/A 2022    Procedure: C7-T1 interlaminar epidural steroid injection;  Surgeon: Marco Rios M.D.;  [facility];  Service: Pain Management    ABDOMINAL HYSTERECTOMY TOTAL      cervix  remains and one ovary.     ARTHROPLASTY Bilateral     Total knee replacements    LAMINOTOMY      L4-5, L5-S1 fusion    OTHER ORTHOPEDIC SURGERY      spinal fusion    OTHER ORTHOPEDIC SURGERY      right knee replacement       Family history   Family History   Problem Relation Age of Onset    Heart Disease Mother     Heart Disease Father     Cancer Father     Alcohol abuse Father     Diabetes Sister     Diabetes Sister     Breast Cancer Maternal Grandmother     Dementia Paternal Grandmother          Medications:   No outpatient medications have been marked as taking for the 11/7/22 encounter (Office Visit) with Marco Rios M.D..        Current Outpatient Medications on File Prior to Visit   Medication Sig Dispense Refill    cyclosporin (RESTASIS) 0.05 % ophthalmic emulsion Administer 1 Drop into both eyes 2 times a day. Individua vials 5.5 mL 2    pregabalin (LYRICA) 75 MG Cap Take 1 Capsule by mouth 2 times a day for 90 days. 180 Capsule 0    meloxicam (MOBIC) 15 MG tablet TAKE 1 TABLET BY MOUTH EVERY DAY WITH FOOD. NO ADVIL/ ALEVE/ MOTRIN/ IBUPROFEN ON SAME DAY 90 Tablet 2    pregabalin (LYRICA) 75 MG Cap Take 75 mg by mouth 3 times a day.      omeprazole (PRILOSEC) 40 MG delayed-release capsule TAKE 1 CAPSULE BY MOUTH EVERY DAY 90 Capsule 1    simvastatin (ZOCOR) 20 MG Tab TAKE 1 TABLET BY MOUTH EVERY EVENING 90 Tablet 0    loteprednol etabonate (LOTEMAX) 0.5 % ophthalmic suspension INSTILL 1 DROP IN BOTH EYES TWICE DAILY      meloxicam (MOBIC) 15 MG tablet 1 tablet PO daily with food. No advil/aleve/motrin/ibuprofen on same day. 60 Tablet 1    cyclobenzaprine (FLEXERIL) 10 mg Tab Take 1 Tablet by mouth 3 times a day as needed. 30 Tablet 0    estradiol (ESTRACE) 1 MG Tab TAKE 1 TABLET BY MOUTH EVERY DAY 90 Tablet 3     No current facility-administered medications on file prior to visit.         Allergies:   No Known Allergies      Social Hx:   Social History     Socioeconomic History    Marital status:   "    Spouse name: Not on file    Number of children: Not on file    Years of education: Not on file    Highest education level: Not on file   Occupational History     Employer: OTHER     Comment: retired   Tobacco Use    Smoking status: Former     Years: 8.00     Types: Cigarettes     Quit date: 1988     Years since quittin.4    Smokeless tobacco: Never   Vaping Use    Vaping Use: Never used   Substance and Sexual Activity    Alcohol use: Yes     Alcohol/week: 0.0 oz     Comment: rare    Drug use: No    Sexual activity: Not Currently     Comment:    Other Topics Concern     Service No    Blood Transfusions No    Caffeine Concern No    Occupational Exposure No    Hobby Hazards No    Sleep Concern Yes     Comment: due to pain    Stress Concern Yes    Weight Concern No    Special Diet No    Back Care No    Exercise Yes    Bike Helmet No    Seat Belt Yes    Self-Exams Yes   Social History Narrative    Not on file     Social Determinants of Health     Financial Resource Strain: Not on file   Food Insecurity: Not on file   Transportation Needs: Not on file   Physical Activity: Not on file   Stress: Not on file   Social Connections: Not on file   Intimate Partner Violence: Not on file   Housing Stability: Not on file         EXAMINATION     Physical Exam:   Vitals: /72 (BP Location: Right arm, Patient Position: Sitting, BP Cuff Size: Adult)   Pulse 88   Temp 36 °C (96.8 °F) (Temporal)   Ht 1.6 m (5' 3\")   Wt 70.6 kg (155 lb 10.3 oz)   SpO2 95%     Constitutional:   Body Habitus: Body mass index is 27.57 kg/m².  Cooperation: Fully cooperates with exam  Appearance: Well-groomed no disheveled    Respiratory-  breathing comfortable on room air, no audible wheezing  Cardiovascular- capillary refills less than 2 seconds. No lower extremity edema is noted.   Psychiatric- alert and oriented ×3. Normal affect.    MSK and Neuro: -    Bilateral hands:   Inspection: No swelling,  Deformities or " rashes. Symmetric appearing thenar and hyperthenar regions bilaterally.  Palpation no significant tenderness to palpation throughout the bilateral hands  Range of motion is within normal limits throughout bilateral hands, fingers and wrist.  Special tests:  Tinel's at the wrist over the median nerve positive right, negative left  Carpal tunnel compression: positive right, negative left  Phalen's test: positive right, negative left  Finkelstein's test: negative bilaterally  CMC grind test negative bilaterally   Strength 4/5 right APB, left 5/5 APB    Cervical spine      Key points for the international standards for neurological classification of spinal cord injury (ISNCSCI) to light touch.     Dermatome R L   C4 2 2   C5 2 2   C6 1 2   C7 1 2   C8 2 2   T1 2 2   T2 2 2                                         Motor Exam Upper Extremities   ? Myotome R L   Shoulder flexion C5 5 5   Elbow flexion C5 5 5   Wrist extension C6 5 5   Elbow extension C7 5 5   Finger flexion C8 5 5   Finger abduction T1 5 5           MEDICAL DECISION MAKING    DATA    Labs:   Lab Results   Component Value Date/Time    SODIUM 140 08/26/2022 11:58 AM    POTASSIUM 4.4 08/26/2022 11:58 AM    CHLORIDE 105 08/26/2022 11:58 AM    CO2 25 08/26/2022 11:58 AM    GLUCOSE 86 08/26/2022 11:58 AM    BUN 20 08/26/2022 11:58 AM    CREATININE 0.75 08/26/2022 11:58 AM        No results found for: PROTHROMBTM, INR     Lab Results   Component Value Date/Time    WBC 5.3 02/23/2022 01:24 PM    RBC 4.43 02/23/2022 01:24 PM    HEMOGLOBIN 13.7 02/23/2022 01:24 PM    HEMATOCRIT 41.3 02/23/2022 01:24 PM    MCV 93.2 02/23/2022 01:24 PM    MCH 30.9 02/23/2022 01:24 PM    MCHC 33.2 (L) 02/23/2022 01:24 PM    MPV 12.3 02/23/2022 01:24 PM    NEUTSPOLYS 61.50 02/23/2022 01:24 PM    LYMPHOCYTES 30.50 02/23/2022 01:24 PM    MONOCYTES 5.70 02/23/2022 01:24 PM    EOSINOPHILS 1.10 02/23/2022 01:24 PM    BASOPHILS 0.80 02/23/2022 01:24 PM        Lab Results   Component Value  Date/Time    HBA1C 5.6 2014 11:27 AM          Imaging:   I personally reviewed following images    MRI cervical spine   There is mild to moderate central canal stenosis at C5-6 with severe left and moderate right neuroforaminal stenosis.  There is mild central canal stenosis at C6-7 with moderate bilateral neuroforaminal stenosis.  There is facet arthropathy right at C2-3.  Also facet arthropathy right worse than left at C3-4, C4-5, C5-6.    I reviewed the following radiology reports                                 Results for orders placed during the hospital encounter of 20   MR-CERVICAL SPINE-W/O    Impression 1.  Mild spinal canal narrowing at C5-6 and C6-7    2.  Foraminal stenoses at C5-6 and C6-7    3.  Multilevel disc bulge, endplate spurring, and facet arthropathy                     Results for orders placed during the hospital encounter of 20   DX-CERVICAL SPINE-WITH FLEX-EXT   7+    Impression 1.  No compression deformity or acute fracture.    2.  There is degenerative disc disease and facet arthropathy with bilateral osseous neural foraminal narrowing.    3.  No focal instability is noted on flexion extension views.                                                                                     DIAGNOSIS   Visit Diagnoses     ICD-10-CM   1. Cervical radiculopathy  M54.12   2. Cervical spinal stenosis  M48.02   3. Cervical spondylosis  M47.812   4. Right carpal tunnel syndrome  G56.01         ASSESSMENT and PLAN:     Shawn Westbrook  1955,   female      Shawn was seen today for follow-up.    Diagnoses and all orders for this visit:    Cervical radiculopathy  -     Referral to Neurodiagnostics (EEG,EP,EMG/NCS/DBS)    Cervical spinal stenosis  -     Referral to Neurodiagnostics (EEG,EP,EMG/NCS/DBS)    Cervical spondylosis  -     Referral to Neurodiagnostics (EEG,EP,EMG/NCS/DBS)    Right carpal tunnel syndrome  Comments:  Continue neutral wrist splint  Orders:  -      Referral to Pain Clinic  -     Referral to Neurodiagnostics (EEG,EP,EMG/NCS/DBS)    No long lasting improvement after the epidural steroid injection x 2. EMG ordered.     I have also ordered a right carpal tunnel injection     Follow up: 11/10/2022     Thank you for allowing me to participate in the care of this patient. If you have any questions please not hesitate to contact me.             Please note that this dictation was created using voice recognition software. I have made every reasonable attempt to correct obvious errors but there may be errors of grammar and content that I may have overlooked prior to finalization of this note.      Marco Rios MD  Interventional Spine and Sports Physiatry  Physical Medicine and Rehabilitation  [facility]

## 2022-11-09 DIAGNOSIS — H04.129 DRY EYE: ICD-10-CM

## 2022-11-09 RX ORDER — CYCLOSPORINE 0.5 MG/ML
1 EMULSION OPHTHALMIC 2 TIMES DAILY
Qty: 5.5 ML | Refills: 2 | Status: SHIPPED | OUTPATIENT
Start: 2022-11-09 | End: 2022-12-05

## 2022-11-10 ENCOUNTER — OFFICE VISIT (OUTPATIENT)
Dept: PHYSICAL MEDICINE AND REHAB | Facility: MEDICAL CENTER | Age: 67
End: 2022-11-10
Payer: MEDICARE

## 2022-11-10 VITALS
BODY MASS INDEX: 27.46 KG/M2 | WEIGHT: 155 LBS | DIASTOLIC BLOOD PRESSURE: 62 MMHG | SYSTOLIC BLOOD PRESSURE: 108 MMHG | TEMPERATURE: 97.1 F | OXYGEN SATURATION: 95 % | HEART RATE: 76 BPM | HEIGHT: 63 IN

## 2022-11-10 DIAGNOSIS — G56.01 CARPAL TUNNEL SYNDROME, RIGHT: ICD-10-CM

## 2022-11-10 DIAGNOSIS — M48.02 CERVICAL SPINAL STENOSIS: ICD-10-CM

## 2022-11-10 DIAGNOSIS — M54.12 CERVICAL RADICULOPATHY: ICD-10-CM

## 2022-11-10 DIAGNOSIS — M65.331 TRIGGER FINGER, RIGHT MIDDLE FINGER: ICD-10-CM

## 2022-11-10 DIAGNOSIS — M47.812 CERVICAL SPONDYLOSIS: ICD-10-CM

## 2022-11-10 PROCEDURE — 99214 OFFICE O/P EST MOD 30 MIN: CPT | Mod: 25 | Performed by: PHYSICAL MEDICINE & REHABILITATION

## 2022-11-10 PROCEDURE — 76942 ECHO GUIDE FOR BIOPSY: CPT | Performed by: PHYSICAL MEDICINE & REHABILITATION

## 2022-11-10 PROCEDURE — 20550 NJX 1 TENDON SHEATH/LIGAMENT: CPT | Mod: RT | Performed by: PHYSICAL MEDICINE & REHABILITATION

## 2022-11-10 PROCEDURE — 20526 THER INJECTION CARP TUNNEL: CPT | Mod: 59,RT | Performed by: PHYSICAL MEDICINE & REHABILITATION

## 2022-11-10 RX ORDER — DEXAMETHASONE SODIUM PHOSPHATE 4 MG/ML
4 INJECTION, SOLUTION INTRA-ARTICULAR; INTRALESIONAL; INTRAMUSCULAR; INTRAVENOUS; SOFT TISSUE ONCE
Status: COMPLETED | OUTPATIENT
Start: 2022-11-10 | End: 2022-11-10

## 2022-11-10 RX ADMIN — DEXAMETHASONE SODIUM PHOSPHATE 4 MG: 4 INJECTION, SOLUTION INTRA-ARTICULAR; INTRALESIONAL; INTRAMUSCULAR; INTRAVENOUS; SOFT TISSUE at 08:26

## 2022-11-10 ASSESSMENT — FIBROSIS 4 INDEX: FIB4 SCORE: 1.03

## 2022-11-10 ASSESSMENT — PAIN SCALES - GENERAL: PAINLEVEL: 6=MODERATE PAIN

## 2022-11-10 ASSESSMENT — PATIENT HEALTH QUESTIONNAIRE - PHQ9: CLINICAL INTERPRETATION OF PHQ2 SCORE: 0

## 2022-11-10 NOTE — TELEPHONE ENCOUNTER
Received request via: Pharmacy    Was the patient seen in the last year in this department? Yes    Does the patient have an active prescription (recently filled or refills available) for medication(s) requested? No    Does the patient have snf Plus and need 100 day supply (blood pressure, diabetes and cholesterol meds only)? Patient does not have SCP

## 2022-11-10 NOTE — PROGRESS NOTES
Follow up patient note  Interventional spine and sports physiatry, Physical medicine rehabilitation      Chief complaint:   Chief Complaint   Patient presents with    Follow-Up     Hand pain          HISTORY    Please see new patient note by Dr Rios,  for more details.     HPI  Patient identification: Shawn Westbrook  1955,   female  With Diagnoses of Carpal tunnel syndrome, right, Trigger finger, right middle finger, Cervical radiculopathy, Cervical spinal stenosis, and Cervical spondylosis were pertinent to this visit.     Procedures:  2020 C7-T1 interlaminar epidural steroid injection with 100% improvement in pain and function in regards to the neck and arm pain after the injection follow-up visit on 2020 C7-T1 cervical interlaminar epidural steroid injection initially with 60% improvement but this only lasted for a few weeks    Continues to have right-sided neck pain radiating down the arm right arm 7 out of 10 intensity with burning in quality and associated numbness.  She has a difficult time telling her carpal tunnel from her cervical radiculopathy.  She is also having triggering of the right middle finger and requesting treatment.  This is causing pain.  This does not require the other hand to reduce the triggering.  The patient is now hesitant for surgery and asking for a third epidural steroid injection.  She has had 2 epidural steroid injections since .  She has had 1 epidural steroid injection this year.      Failed home exercise program.     Medications tried include gabapentin which had to be stopped because of side effects.  Patient is on Lyrica now. She is also use meloxicam and Flexeril.           ROS Red Flags :   Fever, Chills, Sweats: Denies  Involuntary Weight Loss: Denies  Bowel/Bladder Incontinence: Denies  Saddle Anesthesia: Denies        PMHx:   Past Medical History:   Diagnosis Date    Chronic low back pain     Hypertension     MVP (mitral valve  prolapse)     Pericarditis 2010       PSHx:   Past Surgical History:   Procedure Laterality Date    [place] INJ CERV/THORAC,W/ IMAGING N/A 9/27/2022    Procedure: C7-T1 interlaminar epidural steroid injection;  Surgeon: Marco Rios M.D.;  Location[facility];  Service: Pain Management    ABDOMINAL HYSTERECTOMY TOTAL      cervix remains and one ovary.     ARTHROPLASTY Bilateral     Total knee replacements    LAMINOTOMY      L4-5, L5-S1 fusion    OTHER ORTHOPEDIC SURGERY      spinal fusion    OTHER ORTHOPEDIC SURGERY      right knee replacement       Family history   Family History   Problem Relation Age of Onset    Heart Disease Mother     Heart Disease Father     Cancer Father     Alcohol abuse Father     Diabetes Sister     Diabetes Sister     Breast Cancer Maternal Grandmother     Dementia Paternal Grandmother          Medications:   Outpatient Medications Marked as Taking for the 11/10/22 encounter (Office Visit) with Marco Rios M.D.   Medication Sig Dispense Refill    cycloSPORINE (RESTASIS) 0.05 % ophthalmic emulsion Administer 1 Drop into both eyes 2 times a day. Individua vials 5.5 mL 2    pregabalin (LYRICA) 75 MG Cap Take 1 Capsule by mouth 2 times a day for 90 days. 180 Capsule 0    meloxicam (MOBIC) 15 MG tablet TAKE 1 TABLET BY MOUTH EVERY DAY WITH FOOD. NO ADVIL/ ALEVE/ MOTRIN/ IBUPROFEN ON SAME DAY 90 Tablet 2    pregabalin (LYRICA) 75 MG Cap Take 1 Capsule by mouth 3 times a day.      omeprazole (PRILOSEC) 40 MG delayed-release capsule TAKE 1 CAPSULE BY MOUTH EVERY DAY 90 Capsule 1    simvastatin (ZOCOR) 20 MG Tab TAKE 1 TABLET BY MOUTH EVERY EVENING 90 Tablet 0    loteprednol etabonate (LOTEMAX) 0.5 % ophthalmic suspension INSTILL 1 DROP IN BOTH EYES TWICE DAILY      meloxicam (MOBIC) 15 MG tablet 1 tablet PO daily with food. No advil/aleve/motrin/ibuprofen on same day. 60 Tablet 1    cyclobenzaprine (FLEXERIL) 10 mg Tab Take 1 Tablet by mouth 3 times a day as needed. 30  Tablet 0    estradiol (ESTRACE) 1 MG Tab TAKE 1 TABLET BY MOUTH EVERY DAY 90 Tablet 3        Current Outpatient Medications on File Prior to Visit   Medication Sig Dispense Refill    cycloSPORINE (RESTASIS) 0.05 % ophthalmic emulsion Administer 1 Drop into both eyes 2 times a day. Individua vials 5.5 mL 2    pregabalin (LYRICA) 75 MG Cap Take 1 Capsule by mouth 2 times a day for 90 days. 180 Capsule 0    meloxicam (MOBIC) 15 MG tablet TAKE 1 TABLET BY MOUTH EVERY DAY WITH FOOD. NO ADVIL/ ALEVE/ MOTRIN/ IBUPROFEN ON SAME DAY 90 Tablet 2    pregabalin (LYRICA) 75 MG Cap Take 1 Capsule by mouth 3 times a day.      omeprazole (PRILOSEC) 40 MG delayed-release capsule TAKE 1 CAPSULE BY MOUTH EVERY DAY 90 Capsule 1    simvastatin (ZOCOR) 20 MG Tab TAKE 1 TABLET BY MOUTH EVERY EVENING 90 Tablet 0    loteprednol etabonate (LOTEMAX) 0.5 % ophthalmic suspension INSTILL 1 DROP IN BOTH EYES TWICE DAILY      meloxicam (MOBIC) 15 MG tablet 1 tablet PO daily with food. No advil/aleve/motrin/ibuprofen on same day. 60 Tablet 1    cyclobenzaprine (FLEXERIL) 10 mg Tab Take 1 Tablet by mouth 3 times a day as needed. 30 Tablet 0    estradiol (ESTRACE) 1 MG Tab TAKE 1 TABLET BY MOUTH EVERY DAY 90 Tablet 3     No current facility-administered medications on file prior to visit.         Allergies:   No Known Allergies      Social Hx:   Social History     Socioeconomic History    Marital status:      Spouse name: Not on file    Number of children: Not on file    Years of education: Not on file    Highest education level: Not on file   Occupational History     Employer: OTHER     Comment: retired   Tobacco Use    Smoking status: Former     Years: 8.00     Types: Cigarettes     Quit date: 1988     Years since quittin.4    Smokeless tobacco: Never   Vaping Use    Vaping Use: Never used   Substance and Sexual Activity    Alcohol use: Yes     Alcohol/week: 0.0 oz     Comment: rare    Drug use: No    Sexual activity: Not Currently  "    Comment:    Other Topics Concern     Service No    Blood Transfusions No    Caffeine Concern No    Occupational Exposure No    Hobby Hazards No    Sleep Concern Yes     Comment: due to pain    Stress Concern Yes    Weight Concern No    Special Diet No    Back Care No    Exercise Yes    Bike Helmet No    Seat Belt Yes    Self-Exams Yes   Social History Narrative    Not on file     Social Determinants of Health     Financial Resource Strain: Not on file   Food Insecurity: Not on file   Transportation Needs: Not on file   Physical Activity: Not on file   Stress: Not on file   Social Connections: Not on file   Intimate Partner Violence: Not on file   Housing Stability: Not on file         EXAMINATION     Physical Exam:   Vitals: /62 (BP Location: Right arm, Patient Position: Sitting, BP Cuff Size: Adult)   Pulse 76   Temp 36.2 °C (97.1 °F) (Temporal)   Ht 1.6 m (5' 3\")   Wt 70.3 kg (155 lb)   SpO2 95%     Constitutional:   Body Habitus: Body mass index is 27.46 kg/m².  Cooperation: Fully cooperates with exam  Appearance: Well-groomed no disheveled    Respiratory-  breathing comfortable on room air, no audible wheezing  Cardiovascular- capillary refills less than 2 seconds. No lower extremity edema is noted.   Psychiatric- alert and oriented ×3. Normal affect.    MSK and Neuro: -  Exam done 11/10/2022       Bilateral hands:   Inspection: No swelling,  Deformities or rashes. Symmetric appearing thenar and hyperthenar regions bilaterally.  Palpation no significant tenderness to palpation throughout the bilateral hands  Range of motion is within normal limits throughout bilateral hands, fingers and wrist.  Special tests:  Tinel's at the wrist over the median nerve positive right, negative left  Carpal tunnel compression: positive right, negative left  Phalen's test: positive right, negative left  Finkelstein's test: negative bilaterally  CMC grind test negative bilaterally   Strength 4/5 right " APB, left 5/5 APB  Positive for triggering of the right middle finger    Cervical spine      Key points for the international standards for neurological classification of spinal cord injury (ISNCSCI) to light touch.     Dermatome R L   C4 2 2   C5 2 2   C6 1 2   C7 1 2   C8 2 2   T1 2 2   T2 2 2                                         Motor Exam Upper Extremities   ? Myotome R L   Shoulder flexion C5 5 5   Elbow flexion C5 5 5   Wrist extension C6 5 5   Elbow extension C7 5 5   Finger flexion C8 5 5   Finger abduction T1 5 5           MEDICAL DECISION MAKING    DATA    Labs:   Lab Results   Component Value Date/Time    SODIUM 140 08/26/2022 11:58 AM    POTASSIUM 4.4 08/26/2022 11:58 AM    CHLORIDE 105 08/26/2022 11:58 AM    CO2 25 08/26/2022 11:58 AM    GLUCOSE 86 08/26/2022 11:58 AM    BUN 20 08/26/2022 11:58 AM    CREATININE 0.75 08/26/2022 11:58 AM        No results found for: PROTHROMBTM, INR     Lab Results   Component Value Date/Time    WBC 5.3 02/23/2022 01:24 PM    RBC 4.43 02/23/2022 01:24 PM    HEMOGLOBIN 13.7 02/23/2022 01:24 PM    HEMATOCRIT 41.3 02/23/2022 01:24 PM    MCV 93.2 02/23/2022 01:24 PM    MCH 30.9 02/23/2022 01:24 PM    MCHC 33.2 (L) 02/23/2022 01:24 PM    MPV 12.3 02/23/2022 01:24 PM    NEUTSPOLYS 61.50 02/23/2022 01:24 PM    LYMPHOCYTES 30.50 02/23/2022 01:24 PM    MONOCYTES 5.70 02/23/2022 01:24 PM    EOSINOPHILS 1.10 02/23/2022 01:24 PM    BASOPHILS 0.80 02/23/2022 01:24 PM        Lab Results   Component Value Date/Time    HBA1C 5.6 06/25/2014 11:27 AM          Imaging:   I personally reviewed following images    MRI cervical spine 6/ 17/2020  There is mild to moderate central canal stenosis at C5-6 with severe left and moderate right neuroforaminal stenosis.  There is mild central canal stenosis at C6-7 with moderate bilateral neuroforaminal stenosis.  There is facet arthropathy right at C2-3.  Also facet arthropathy right worse than left at C3-4, C4-5, C5-6.    I reviewed the following  radiology reports                                 Results for orders placed during the hospital encounter of 20   MR-CERVICAL SPINE-W/O    Impression 1.  Mild spinal canal narrowing at C5-6 and C6-7    2.  Foraminal stenoses at C5-6 and C6-7    3.  Multilevel disc bulge, endplate spurring, and facet arthropathy                     Results for orders placed during the hospital encounter of 20   DX-CERVICAL SPINE-WITH FLEX-EXT   7+    Impression 1.  No compression deformity or acute fracture.    2.  There is degenerative disc disease and facet arthropathy with bilateral osseous neural foraminal narrowing.    3.  No focal instability is noted on flexion extension views.                                                                                     DIAGNOSIS   Visit Diagnoses     ICD-10-CM   1. Carpal tunnel syndrome, right  G56.01   2. Trigger finger, right middle finger  M65.331   3. Cervical radiculopathy  M54.12   4. Cervical spinal stenosis  M48.02   5. Cervical spondylosis  M47.812         ASSESSMENT and PLAN:     Shawn Westbrook  1955,   female      Shawn was seen today for follow-up.    Diagnoses and all orders for this visit:    Carpal tunnel syndrome, right  Comments:  Proceed with right carpal tunnel injection  Orders:  -     Consent for all Surgical, Special Diagnostic or Therapeutic Procedures  -     dexamethasone (DECADRON) injection 4 mg    Trigger finger, right middle finger  Comments:  Today we decided to proceed with right trigger finger injection of the middle finger flexor tendon    Cervical radiculopathy  Comments:  Not controlled.  I placed an order for an epidural steroid injection.  The patient wishes to consider this as she is now hesitant for surgery.  Orders:  -     Referral to Physical Medicine Rehab    Cervical spinal stenosis  Comments:  Not controlled.  Patient understands emergency precautions.    Cervical spondylosis        Follow up: Approximately 1 month  after the epidural steroid injection    Thank you for allowing me to participate in the care of this patient. If you have any questions please not hesitate to contact me.             Please note that this dictation was created using voice recognition software. I have made every reasonable attempt to correct obvious errors but there may be errors of grammar and content that I may have overlooked prior to finalization of this note.      Marco Rios MD  Interventional Spine and Sports Physiatry  Physical Medicine and Rehabilitation  [facility]

## 2022-11-10 NOTE — PROCEDURES
Date of Service: 11/10/2022    Physician/s: Marco Rios MD    Pre-operative Diagnosis: RIGHT long digit trigger finger and pain    Post-operative Diagnosis: RIGHT long digit trigger finger and pain    Procedure: RIGHT long digit trigger finger injection    Description of procedure:    The risks, benefits, and alternatives of the procedure were reviewed and discussed with the patient.  Written informed consent was freely obtained. A pre-procedural time-out was conducted by the physician verifying patient’s identity, procedure to be performed, procedure site and side, and allergy verification. Appropriate equipment was determined to be in place for the procedure.     No sedation was used for this procedure.     In the office suite the patient was placed in a sitting position, and her RIGHT  hand/s were rested with the palm/s up on a sterile rodriguez stand, and the skin was prepped and draped in the usual sterile fashion on the whole palmar hand and fingers. A solution was prepared with 2mL of 1% lidocaine, 1mL of 4mg/mL of dexamethasone for a total of 3mL in a 3mL syringe with a 27g 1.5 inch needle.  The RIGHT  long flexor tendon for injection was identified at the proximal and distal joint line/s, and the targets for injection were marked. Under ultrasound guidance with an out of plane approach,  A 27g 1.5 inch needle was placed into skin and advanced adjacent to the tendon  proximal to the metacarpophalangeal joint line of the finger. Following negative aspiration, a total of 0.5cc's solution mixture was injected adjacent to the tendon. .         The patient's skin was wiped with a 4x4 gauze, the area was cleansed with alcohol prep, and a bandaid was applied. There were no complications noted.     The images were uploaded to our secure system for permanent storage.     Marco Rios MD  Interventional Spine and Pain  Physical Medicine and Rehabilitation  [facility]

## 2022-11-10 NOTE — PROCEDURES
Date of Service: 11/10/2022    Patient: Shawn Westbrook 66 y.o. female MRN: [id number]     Physician/s: Marco Rios MD    Pre-operative Diagnosis: RIGHT  carpal tunnel syndrome    Post-operative Diagnosis: RIGHT  carpal tunnel syndrome      Procedure: RIGHT  ultrasound-guided carpal tunnel injection    Description of procedure:    The risks, benefits, and alternatives of the procedure were reviewed and discussed with the patient.  Written informed consent was freely obtained. A pre-procedural time-out was conducted by the physician verifying patient’s identity, procedure to be performed, procedure site and side, and allergy verification. Appropriate equipment was determined to be in place for the procedure.     No sedation was used for this procedure.     A solution was prepared with 1 mL of 4 mg/mL of dexamethasone and 2 mL of 1% lidocaine.      In the office suite the patient was placed in a sitting position, and her RIGHT   hand/s were rested with the palm/s up on a sterile rodriguez stand, and the skin was prepped and draped in the usual sterile fashion. Median nerve at the carpal tunnel was identified under ultrasound guidance. Also identified where the ulnar nerve, ulnar artery, radial artery to confirm the needle path would not be impacting the structures. Under ultrasound guidance with an in plane approach,  A 27g 1.5 inch needle was placed into skin and advanced adjacent so the needle path was deep to the median nerve. Following negative aspiration, a total of 1.5mL solution mixture was injected perineurally. The patient's skin was wiped with a 4x4 gauze, the area was cleansed with alcohol prep, and a bandaid was applied. There were no complications noted.     The images were uploaded to our secure system for permanent storage.     The patient had no hand pain or forearm pain post procedure.  I advised her to keep a log of her neck pain and radicular component of pain as this related to the carpal tunnel  injection.    Marco Rios MD  Interventional Spine and Pain  Physical Medicine and Rehabilitation  [facility]

## 2022-11-11 DIAGNOSIS — E78.2 MIXED HYPERLIPIDEMIA: ICD-10-CM

## 2022-11-11 RX ORDER — SIMVASTATIN 20 MG
20 TABLET ORAL EVERY EVENING
Qty: 90 TABLET | Refills: 0 | Status: SHIPPED | OUTPATIENT
Start: 2022-11-11 | End: 2022-12-06 | Stop reason: SDUPTHER

## 2022-11-14 ENCOUNTER — TELEPHONE (OUTPATIENT)
Dept: PAIN MANAGEMENT | Facility: REHABILITATION | Age: 67
End: 2022-11-14
Payer: MEDICARE

## 2022-11-23 ENCOUNTER — NON-PROVIDER VISIT (OUTPATIENT)
Dept: NEUROLOGY | Facility: MEDICAL CENTER | Age: 67
End: 2022-11-23
Attending: PSYCHIATRY & NEUROLOGY
Payer: MEDICARE

## 2022-11-23 DIAGNOSIS — G56.01 CARPAL TUNNEL SYNDROME ON RIGHT: ICD-10-CM

## 2022-11-23 DIAGNOSIS — M54.12 CERVICAL RADICULOPATHY: ICD-10-CM

## 2022-11-23 PROCEDURE — 95910 NRV CNDJ TEST 7-8 STUDIES: CPT | Performed by: PSYCHIATRY & NEUROLOGY

## 2022-11-23 PROCEDURE — 95910 NRV CNDJ TEST 7-8 STUDIES: CPT | Mod: 26 | Performed by: PSYCHIATRY & NEUROLOGY

## 2022-11-23 PROCEDURE — 95886 MUSC TEST DONE W/N TEST COMP: CPT | Performed by: PSYCHIATRY & NEUROLOGY

## 2022-11-23 PROCEDURE — 95886 MUSC TEST DONE W/N TEST COMP: CPT | Mod: 26 | Performed by: PSYCHIATRY & NEUROLOGY

## 2022-11-23 NOTE — PROCEDURES
"NERVE CONDUCTION STUDIES AND ELECTROMYOGRAPHY REPORT  [facility]  11/23/22          IMPRESSION:  This is an abnormal study.  There is electrophysiologic evidence of mild right median neuropathy at the wrist (carpal tunnel syndrome).  There is no evidence of right cervical (C5-T1) radiculopathy.  Clinical correlation is recommended.      Christi Pereira MD  Neurology - Neurophysiology              REASON FOR REFERRAL:  Ms. Shawn Westbrook 66 y.o. referred by Dr. Marco Rios/Dr. Hess for evaluation of numbness and burning in the right upper extremity.  She has associated right-sided shoulder pain with radiation distally.    Height: 5'3\"  Weight: 150 lbs      ELECTRODIAGNOSTIC EXAMINATION:  Nerve conduction studies (NCS) and electromyography (EMG) are utilized to evaluate direct or indirect damage to the peripheral nervous system. NCS are performed to measure the nerve(s) response(s) to electrostimulation across a given nerve segment. EMG evaluates the passive and active electrical activity of the muscle(s) in question.  Muscles are innervated by specific peripheral nerves and roots. Often times, several nerves the muscle to be examined in order to determine the presence or absence of the disease process. Furthermore, nerves and muscles may need to be tested in a [id number]-gwiz comparison, as well as in additional extremities, as this may be crucial in characterizing the extent of the disease process, which may be diffuse or isolated and of varying degree of severity. The extent of the neurodiagnostic exam is justified as it may help arrive to a proper diagnosis, which ultimately may contribute to better management of the patient. Therefore, the nerves to muscles examined during the study were medically necessary.    Unless otherwise noted, temperature of the extremity(s) study was monitored before and during the examination and remained between 32 and 36 degrees C for the upper extremities, " and between 30 and 36 degrees C for the lower extremities. The patient tolerated testing well, without any complications.       NERVE CONDUCTION STUDY SUMMARY:  Selected nerves of the right upper extremity are studied.    Normal right median sensory and motor responses.  Normal right ulnar sensory and motor responses.  Abnormal right median/ulnar palmar comparison due to a relatively prolonged median latency.  Abnormal right median/radial digit 1 comparison due to a relatively prolonged median latency.      NEEDLE EMG SUMMARY:  Concentric needle study of selected right upper extremity and lower cervical paraspinal muscles is performed.     Insertion activity is normal in all muscles sampled including cervical paraspinals.   With activation, there are normal morphology (amplitude/duration) motor unit action potentials firing with normal recruitment in muscles tested.       PATIENT DATA TABLES  Nerve Conduction Studies     Stim Site NR Onset (ms) Norm Onset (ms) O-P Amp (µV) Norm O-P Amp Site1 Site2 Delta-P (ms) Dist (cm) Mike (m/s) Norm Mike (m/s)   Right Median Anti Sensory (2nd Digit)   Wrist    2.5 <3.8 21.7 >10 Wrist 2nd Digit 3.2 14.0 *44 >50   Right Ulnar Anti Sensory (5th Digit)   Wrist    2.4 <3.2 28.6 >5 Wrist 5th Digit 3.1 14.0 *45 >50        Stim Site NR Onset (ms) Norm Onset (ms) O-P Amp (mV) Norm O-P Amp Site1 Site2 Delta-0 (ms) Dist (cm) Mike (m/s) Norm Mike (m/s)   Right Median Motor (Abd Poll Brev)   Wrist    3.4 <4 11.1 >5 Elbow Wrist 4.0 24.0 60 >50   Elbow    7.4  10.4          Right Ulnar Motor (Abd Dig Min)   Wrist    2.9 <3.1 8.4 >7 B Elbow Wrist 2.5 17.0 68 >50   B Elbow    5.4  8.4  A Elbow B Elbow 1.4 10.0 71    A Elbow    6.8  8.3               Stim Site NR Peak (ms) Norm Peak (ms) P-T Amp (µV) Site1 Site2 Delta-P (ms) Norm Delta (ms)   Right Median/Radial Dig I Comparison (Digit 1 - 10cm)   Median    2.9 <2.9 13.2 Median Radial *0.8 <0.4   Radial    2.1 <2.8 17.4       Right Median/Ulnar Palm  Comparison (Wrist - 8cm)   Median Palm    2.2 <2.3 24.4 Median Palm Ulnar Palm *0.6 <0.3   Ulnar Palm    1.6 <2.3 33.6                       Electromyography     Side Muscle Nerve Root Ins Act Fibs Psw Amp Dur Poly Recrt Int Pat Comment   Right 1stDorInt Ulnar C8-T1 Nml Nml Nml Nml Nml 0 Nml Nml    Right PronatorTeres Median C6-7 Nml Nml Nml Nml Nml 0 Nml Nml    Right Biceps Musculocut C5-6 Nml Nml Nml Nml Nml 0 Nml Nml    Right Triceps Radial C6-7-8 Nml Nml Nml Nml Nml 0 Nml Nml    Right Deltoid Axillary C5-6 Nml Nml Nml Nml Nml 0 Nml Nml    Right C6 Parasp Rami C6 Nml Nml Nml         Right C7 Parasp Rami C7 Nml Nml Nml         Right Abd Poll Brev Median C8-T1 Nml Nml Nml Nml Nml 0 Nml Nml

## 2022-12-02 DIAGNOSIS — H04.129 DRY EYE: ICD-10-CM

## 2022-12-05 RX ORDER — CYCLOSPORINE 0.5 MG/ML
EMULSION OPHTHALMIC
Qty: 180 EACH | Refills: 0 | Status: SHIPPED | OUTPATIENT
Start: 2022-12-05 | End: 2023-07-05

## 2022-12-06 ENCOUNTER — OFFICE VISIT (OUTPATIENT)
Dept: MEDICAL GROUP | Facility: PHYSICIAN GROUP | Age: 67
End: 2022-12-06
Payer: MEDICARE

## 2022-12-06 VITALS
SYSTOLIC BLOOD PRESSURE: 110 MMHG | BODY MASS INDEX: 26.97 KG/M2 | RESPIRATION RATE: 16 BRPM | OXYGEN SATURATION: 99 % | WEIGHT: 152.22 LBS | DIASTOLIC BLOOD PRESSURE: 64 MMHG | HEART RATE: 70 BPM | TEMPERATURE: 98.9 F | HEIGHT: 63 IN

## 2022-12-06 DIAGNOSIS — M54.2 CHRONIC NECK PAIN: ICD-10-CM

## 2022-12-06 DIAGNOSIS — E78.2 MIXED HYPERLIPIDEMIA: ICD-10-CM

## 2022-12-06 DIAGNOSIS — G89.29 CHRONIC NECK PAIN: ICD-10-CM

## 2022-12-06 DIAGNOSIS — R07.89 CHEST DISCOMFORT: ICD-10-CM

## 2022-12-06 DIAGNOSIS — I34.1 MVP (MITRAL VALVE PROLAPSE): ICD-10-CM

## 2022-12-06 PROCEDURE — 93000 ELECTROCARDIOGRAM COMPLETE: CPT | Performed by: FAMILY MEDICINE

## 2022-12-06 PROCEDURE — 99214 OFFICE O/P EST MOD 30 MIN: CPT | Performed by: FAMILY MEDICINE

## 2022-12-06 RX ORDER — HYDROCODONE BITARTRATE AND ACETAMINOPHEN 7.5; 325 MG/1; MG/1
1 TABLET ORAL EVERY 6 HOURS PRN
COMMUNITY
Start: 2022-10-10 | End: 2022-12-06

## 2022-12-06 RX ORDER — AMOXICILLIN 500 MG/1
TABLET, FILM COATED ORAL
COMMUNITY
Start: 2022-10-10 | End: 2022-12-06

## 2022-12-06 RX ORDER — SIMVASTATIN 20 MG
20 TABLET ORAL EVERY EVENING
Qty: 90 TABLET | Refills: 2 | Status: SHIPPED | OUTPATIENT
Start: 2022-12-06 | End: 2023-04-20

## 2022-12-06 ASSESSMENT — FIBROSIS 4 INDEX: FIB4 SCORE: 1.03

## 2022-12-06 NOTE — PROGRESS NOTES
Subjective:     CC:   Chief Complaint   Patient presents with    Follow-Up       HPI:   Shawn presents today for follow-up.  Patient is seen in neurosurgery they are doing a EMG she may have some occult carpal tunnel but there is also concern she has some neck issues.  Patient is thinking they will probably need to do a neck operation on her.  Patient is feeling well but she says that she has had mitral valve prolapse in the past in this year she had 3 episodes well she had some upper back pain and chest pain that lasts about 3 to 5 minutes.  Patient was not seen by anyone for this.    Past Medical History:   Diagnosis Date    Chronic low back pain     Hypertension     MVP (mitral valve prolapse)     Pericarditis        Social History     Tobacco Use    Smoking status: Former     Years: 8.00     Types: Cigarettes     Quit date: 1988     Years since quittin.5    Smokeless tobacco: Never   Vaping Use    Vaping Use: Never used   Substance Use Topics    Alcohol use: Yes     Alcohol/week: 0.0 oz     Comment: rare    Drug use: No       Current Outpatient Medications Ordered in Epic   Medication Sig Dispense Refill    tretinoin (RETIN-A) 0.025 % cream APPLY PEA SIZED AMOUNT TOPICALLY TO FACE EVERY 3 NIGHTS. INCREASE TO EVERY NIGHT AS IRRITATION ALLOWS      simvastatin (ZOCOR) 20 MG Tab Take 1 Tablet by mouth every evening. 90 Tablet 2    RESTASIS 0.05 % ophthalmic emulsion ADMINISTER 1 DROP INTO BOTH EYES TWICE DAILY 180 Each 0    pregabalin (LYRICA) 75 MG Cap Take 1 Capsule by mouth 2 times a day for 90 days. 180 Capsule 0    omeprazole (PRILOSEC) 40 MG delayed-release capsule TAKE 1 CAPSULE BY MOUTH EVERY DAY 90 Capsule 1    meloxicam (MOBIC) 15 MG tablet 1 tablet PO daily with food. No advil/aleve/motrin/ibuprofen on same day. 60 Tablet 1    cyclobenzaprine (FLEXERIL) 10 mg Tab Take 1 Tablet by mouth 3 times a day as needed. 30 Tablet 0    estradiol (ESTRACE) 1 MG Tab TAKE 1 TABLET BY MOUTH EVERY DAY 90  "Tablet 3     No current [facility]-ordered facility-administered medications on file.       Allergies:  Patient has no known allergies.    Health Maintenance: Completed    ROS:  Gen: no fevers/chills, no changes in weight  Eyes: no changes in vision  ENT: no sore throat, no hearing loss, no bloody nose  Pulm: no sob, no cough  CV: no chest pain, no palpitations  GI: no nausea/vomiting, no diarrhea  Neuro: no headaches  Heme/Lymph: no easy bruising    Objective:     Exam:  /64 (BP Location: Left arm, Patient Position: Sitting, BP Cuff Size: Adult)   Pulse 70   Temp 37.2 °C (98.9 °F) (Temporal)   Resp 16   Ht 1.6 m (5' 3\")   Wt 69 kg (152 lb 3.5 oz)   SpO2 99%   BMI 26.96 kg/m²  Body mass index is 26.96 kg/m².    Gen: Alert and oriented, No apparent distress.  Skin: Warm and dry.  No obvious lesions.  Eyes: Sclera wnl Pupils normal in size  Lungs: Normal effort, CTA bilaterally, no wheezes, rhonchi, or rales  CV: Regular rate and rhythm. No murmurs, rubs, or gallops.  EKG was done it appears about the same as it was in 2015  Musculoskeletal: Normal gait. No extremity cyanosis, clubbing, or edema.  Neuro: Oriented to person, place and time  Psych: Mood is wnl       Labs: Patient will be due for another cholesterol check sometime in August of next year last one was within normal limits patient to continue taking her simvastatin    Assessment & Plan:     66 y.o. female with the following -     1. Chest discomfort  I would recommend seeing cardiology due to fact she has had 3 episodes of this this is a chronic problem  - EKG - Clinic Performed  - REFERRAL TO CARDIOLOGY    2. MVP (mitral valve prolapse)  - REFERRAL TO CARDIOLOGY    3. Chronic neck pain  Patient is being evaluated by neurosurgery patient may need to have surgery done sometime in the new year this is a chronic problem    4. Mixed hyperlipidemia  Patient will be next due for a lipid check in August this is a chronic problem       Return in about 5 " months (around 5/6/2023).    Please note that this dictation was created using voice recognition software. I have made every reasonable attempt to correct obvious errors, but I expect that there are errors of grammar and possibly content that I did not discover before finalizing the note.

## 2022-12-08 PROBLEM — M48.02 CERVICAL STENOSIS OF SPINE: Status: ACTIVE | Noted: 2022-12-08

## 2022-12-21 ENCOUNTER — TELEPHONE (OUTPATIENT)
Dept: HEALTH INFORMATION MANAGEMENT | Facility: OTHER | Age: 67
End: 2022-12-21
Payer: MEDICARE

## 2022-12-27 ENCOUNTER — TELEPHONE (OUTPATIENT)
Dept: HEALTH INFORMATION MANAGEMENT | Facility: OTHER | Age: 67
End: 2022-12-27
Payer: MEDICARE

## 2023-01-10 ENCOUNTER — APPOINTMENT (OUTPATIENT)
Dept: RADIOLOGY | Facility: REHABILITATION | Age: 68
End: 2023-01-10
Attending: PHYSICAL MEDICINE & REHABILITATION
Payer: MEDICARE

## 2023-01-10 ENCOUNTER — HOSPITAL ENCOUNTER (OUTPATIENT)
Facility: REHABILITATION | Age: 68
End: 2023-01-10
Attending: PHYSICAL MEDICINE & REHABILITATION | Admitting: PHYSICAL MEDICINE & REHABILITATION
Payer: MEDICARE

## 2023-01-10 VITALS
TEMPERATURE: 97.3 F | SYSTOLIC BLOOD PRESSURE: 121 MMHG | WEIGHT: 158.29 LBS | DIASTOLIC BLOOD PRESSURE: 90 MMHG | HEIGHT: 63 IN | BODY MASS INDEX: 28.05 KG/M2 | HEART RATE: 76 BPM | RESPIRATION RATE: 16 BRPM | OXYGEN SATURATION: 95 %

## 2023-01-10 PROCEDURE — 700111 HCHG RX REV CODE 636 W/ 250 OVERRIDE (IP)

## 2023-01-10 PROCEDURE — 700117 HCHG RX CONTRAST REV CODE 255

## 2023-01-10 PROCEDURE — 62321 NJX INTERLAMINAR CRV/THRC: CPT

## 2023-01-10 RX ORDER — DEXAMETHASONE SODIUM PHOSPHATE 10 MG/ML
INJECTION, SOLUTION INTRAMUSCULAR; INTRAVENOUS
Status: COMPLETED
Start: 2023-01-10 | End: 2023-01-10

## 2023-01-10 RX ORDER — LIDOCAINE HYDROCHLORIDE 10 MG/ML
INJECTION, SOLUTION EPIDURAL; INFILTRATION; INTRACAUDAL; PERINEURAL
Status: COMPLETED
Start: 2023-01-10 | End: 2023-01-10

## 2023-01-10 RX ADMIN — DEXAMETHASONE SODIUM PHOSPHATE 10 MG: 10 INJECTION INTRAMUSCULAR; INTRAVENOUS at 10:36

## 2023-01-10 RX ADMIN — IOHEXOL 3 ML: 240 INJECTION, SOLUTION INTRATHECAL; INTRAVASCULAR; INTRAVENOUS; ORAL at 10:36

## 2023-01-10 RX ADMIN — LIDOCAINE HYDROCHLORIDE 10 ML: 10 INJECTION, SOLUTION EPIDURAL; INFILTRATION; INTRACAUDAL; PERINEURAL at 10:34

## 2023-01-10 ASSESSMENT — FIBROSIS 4 INDEX: FIB4 SCORE: 1.05

## 2023-01-10 ASSESSMENT — PAIN DESCRIPTION - PAIN TYPE: TYPE: CHRONIC PAIN

## 2023-01-10 NOTE — OP REPORT
Date of Service: 1/10/2023    Physician/s: Marco Rios MD    Pre-operative Diagnosis: Cervical radiculopathy    Post-operative Diagnosis: Cervical radiculopathy    Procedure: C7-T1  Cervical interlaminar epidural steroid injection    Description of procedure:    The risks, benefits, and alternatives of the procedure were reviewed and discussed with the patient.  Written informed consent was freely obtained. A pre-procedural time-out was conducted by the physician verifying patient’s identity, procedure to be performed, procedure site and side, and allergy verification. Appropriate equipment was determined to be in place for the procedure.         The patient's vital signs were carefully monitored before, throughout, and after the procedure.     In the fluoroscopy suite the patient was placed in a prone position, a pillow placed underneath their chest. The skin was prepped and draped in the usual sterile fashion. The fluoroscope was placed over the cervical neck at the appropriate injection AP angle view, and the target for injection was marked. A 25g needle was placed into the marked site, and approx 2mL of 1% Lidocaine was injected subcutaneously into the epidermal and dermal layers. The needle was removed. A 20g Tuohy needle was then placed and advanced under fluoroscopic guidance into the  C7-T1 interlaminar space at both the initial position AP view and contralateral oblique at a lateral view to ensure proper location of the needle tip at all times.  The needle was advanced with fluoroscopic guidance to the superior aspect of the T1 lamina.  Then a contralateral oblique view was used to advance the needle slightly towards the epidural space, A loss-of-resistance technique was used to guide the needle into the epidural space in a lateral fluoroscopic view and confirmed with loss of resistance with sterile normal saline. contrast dye was used to highlight the epidural space spread while the fluoroscope was  running live. Following negative aspiration, 1mL of 10mg/mL of dexamethasone followed by 2 mL of sterile saline . The needle was removed intact after restyleted. The patient's back was covered with a 4x4 gauze, the area was cleansed with sterile normal saline, and a dressing was applied. There were no complications noted.     The patient was then evaluated post-procedure, and was hemodynamically stable prior to leaving the post-operative care unit.     Follow-up as scheduled    Marco Rios MD  Interventional Spine and Pain  Physical Medicine and Rehabilitation  [facility]        CPT  interlaminar cervical/thoracic epidural: 10663

## 2023-01-10 NOTE — H&P
Physical medicine and rehabilitation preprocedure history & Physical Note    Date  1/10/2023    Primary Care Physician  Princess Mena M.D.    CC  Pre-Op Diagnosis Codes:     * Cervical radiculopathy [M54.12]     HPI  This is a 67 y.o. female who presented with neck pain radiating down the right arm consistent with cervical radiculopathy here for cervical epidural steroid injection.    See previous notes of Dr. Rios    Past Medical History:   Diagnosis Date    Chronic low back pain     Hypertension     MVP (mitral valve prolapse)     Pericarditis        Past Surgical History:   Procedure Laterality Date    OH INJ CERV/THORAC,W/ IMAGING N/A 2022    Procedure: C7-T1 interlaminar epidural steroid injection;  Surgeon: Marco Rios M.D.;  [facility];  Service: Pain Management    ABDOMINAL HYSTERECTOMY TOTAL      cervix remains and one ovary.     ARTHROPLASTY Bilateral     Total knee replacements    LAMINOTOMY      L4-5, L5-S1 fusion    OTHER ORTHOPEDIC SURGERY      spinal fusion    OTHER ORTHOPEDIC SURGERY      right knee replacement       No current facility-administered medications for this encounter.       Social History     Socioeconomic History    Marital status:      Spouse name: Not on file    Number of children: Not on file    Years of education: Not on file    Highest education level: Not on file   Occupational History     Employer: OTHER     Comment: retired   Tobacco Use    Smoking status: Former     Years: 8.00     Types: Cigarettes     Quit date: 1988     Years since quittin.6    Smokeless tobacco: Never   Vaping Use    Vaping Use: Never used   Substance and Sexual Activity    Alcohol use: Yes     Alcohol/week: 0.0 oz     Comment: rare    Drug use: No    Sexual activity: Not Currently     Comment:    Other Topics Concern     Service No    Blood Transfusions No    Caffeine Concern No    Occupational Exposure No    Hobby Hazards No     Sleep Concern Yes     Comment: due to pain    Stress Concern Yes    Weight Concern No    Special Diet No    Back Care No    Exercise Yes    Bike Helmet No    Seat Belt Yes    Self-Exams Yes   Social History Narrative    Not on file     Social Determinants of Health     Financial Resource Strain: Not on file   Food Insecurity: Not on file   Transportation Needs: Not on file   Physical Activity: Not on file   Stress: Not on file   Social Connections: Not on file   Intimate Partner Violence: Not on file   Housing Stability: Not on file       Family History   Problem Relation Age of Onset    Heart Disease Mother     Heart Disease Father     Cancer Father     Alcohol abuse Father     Diabetes Sister     Diabetes Sister     Breast Cancer Maternal Grandmother     Dementia Paternal Grandmother        Allergies  Patient has no known allergies.    Review of Systems  Comprehensive review of systems was negative       Physical Exam  Constitutional:       General: She is not in acute distress.     Appearance: Normal appearance. She is not ill-appearing, toxic-appearing or diaphoretic.   Cardiovascular:      Rate and Rhythm: Normal rate and regular rhythm.      Pulses: Normal pulses.      Heart sounds: Normal heart sounds.   Pulmonary:      Effort: Pulmonary effort is normal.      Breath sounds: Normal breath sounds.   Abdominal:      General: Abdomen is flat. Bowel sounds are normal. There is no distension.      Palpations: Abdomen is soft.      Tenderness: There is no abdominal tenderness. There is no guarding or rebound.   Skin:     General: Skin is warm and dry.      Capillary Refill: Capillary refill takes less than 2 seconds.   Neurological:      Mental Status: She is alert.        Vital Signs  Blood Pressure : 114/70   Temperature: 36.3 °C (97.3 °F)   Pulse: 66   Respiration: 16   Pulse Oximetry: 97 %     Vitals reviewed    Labs:                    Radiology:  DX-PORTABLE FLUOROSCOPY < 1 HOUR    (Results Pending)          Assessment/Plan:  Pre-Op Diagnosis Codes:     * Cervical radiculopathy [M54.12]  Procedure(s):  C7-T1 interlaminar epidural steroid injection

## 2023-01-10 NOTE — PROGRESS NOTES
1043: Rec'd pt from procedure room, pt ambulatory to chair, steady on feet, tolerating liquids. Dressing CDI.  Ice pack placed to incision site.    1045: Dr. espitia in to see patient, meets D/C criteria.    1049: Patient d/c'd to designated , placed in passenger seat.

## 2023-01-11 ENCOUNTER — TELEPHONE (OUTPATIENT)
Dept: PHYSICAL MEDICINE AND REHAB | Facility: MEDICAL CENTER | Age: 68
End: 2023-01-11
Payer: MEDICARE

## 2023-03-06 ENCOUNTER — APPOINTMENT (OUTPATIENT)
Dept: PHYSICAL MEDICINE AND REHAB | Facility: MEDICAL CENTER | Age: 68
End: 2023-03-06
Payer: MEDICARE

## 2023-03-06 RX ORDER — OMEPRAZOLE 40 MG/1
40 CAPSULE, DELAYED RELEASE ORAL DAILY
Qty: 90 CAPSULE | Refills: 1 | Status: SHIPPED | OUTPATIENT
Start: 2023-03-06 | End: 2023-07-07

## 2023-04-03 ENCOUNTER — OFFICE VISIT (OUTPATIENT)
Dept: PHYSICAL MEDICINE AND REHAB | Facility: MEDICAL CENTER | Age: 68
End: 2023-04-03
Payer: MEDICARE

## 2023-04-03 VITALS
SYSTOLIC BLOOD PRESSURE: 92 MMHG | TEMPERATURE: 98.9 F | HEART RATE: 76 BPM | WEIGHT: 159.61 LBS | OXYGEN SATURATION: 98 % | HEIGHT: 63 IN | BODY MASS INDEX: 28.28 KG/M2 | DIASTOLIC BLOOD PRESSURE: 68 MMHG

## 2023-04-03 DIAGNOSIS — M47.812 CERVICAL SPONDYLOSIS: ICD-10-CM

## 2023-04-03 DIAGNOSIS — M54.12 CERVICAL RADICULOPATHY: ICD-10-CM

## 2023-04-03 DIAGNOSIS — G89.29 CHRONIC BILATERAL LOW BACK PAIN WITHOUT SCIATICA: ICD-10-CM

## 2023-04-03 DIAGNOSIS — G89.29 CHRONIC RIGHT HIP PAIN: ICD-10-CM

## 2023-04-03 DIAGNOSIS — G56.01 CARPAL TUNNEL SYNDROME, RIGHT: ICD-10-CM

## 2023-04-03 DIAGNOSIS — Z98.890 HISTORY OF LUMBOSACRAL SPINE SURGERY: ICD-10-CM

## 2023-04-03 DIAGNOSIS — M54.50 CHRONIC BILATERAL LOW BACK PAIN WITHOUT SCIATICA: ICD-10-CM

## 2023-04-03 DIAGNOSIS — M65.331 TRIGGER FINGER, RIGHT MIDDLE FINGER: ICD-10-CM

## 2023-04-03 DIAGNOSIS — M25.551 CHRONIC RIGHT HIP PAIN: ICD-10-CM

## 2023-04-03 DIAGNOSIS — M48.02 CERVICAL SPINAL STENOSIS: ICD-10-CM

## 2023-04-03 PROCEDURE — 99214 OFFICE O/P EST MOD 30 MIN: CPT | Performed by: PHYSICAL MEDICINE & REHABILITATION

## 2023-04-03 RX ORDER — PREGABALIN 100 MG/1
100 CAPSULE ORAL 2 TIMES DAILY
COMMUNITY
End: 2023-12-11

## 2023-04-03 ASSESSMENT — PATIENT HEALTH QUESTIONNAIRE - PHQ9: CLINICAL INTERPRETATION OF PHQ2 SCORE: 0

## 2023-04-03 ASSESSMENT — PAIN SCALES - GENERAL: PAINLEVEL: 3=SLIGHT PAIN

## 2023-04-03 ASSESSMENT — FIBROSIS 4 INDEX: FIB4 SCORE: 1.05

## 2023-04-03 NOTE — PROGRESS NOTES
Follow up patient note  Interventional spine and sports physiatry, Physical medicine rehabilitation      Chief complaint:   Chief Complaint   Patient presents with    Follow-Up     Carpal tunnel syndrome, right          HISTORY    Please see new patient note by Dr Rios,  for more details.     HPI  Patient identification: Shawn Westbrook  1955,   female  With Diagnoses of Cervical radiculopathy, Cervical spinal stenosis, Cervical spondylosis, Trigger finger, right middle finger, Carpal tunnel syndrome, right, Chronic bilateral low back pain without sciatica, Chronic right hip pain, and History of lumbosacral spine surgery L4-5 and L5-S1, 1999 were pertinent to this visit.     Procedures:  2020 C7-T1 interlaminar epidural steroid injection with 100% improvement in pain and function in regards to the neck and arm pain after the injection follow-up visit on 2020 C7-T1 cervical interlaminar epidural steroid injection initially with 60% improvement but this only lasted for a few weeks  11/10/2022 right long digit trigger finger injection 100% relief of triggering  11/10/2022 right carpal tunnel injection 100% pain relief  1/10/2023 C7-T1 cervical interlaminar epidural steroid injection 100% pain relief      Overall the patient is had excellent pain relief after the above procedures.  She is no longer having triggering in the hand.  She denies numbness tingling sensation in the bilateral hands.  She had excellent pain relief of the neck pain and the pain radiating down the arm and does not have pain in these areas.  Preprocedure pain 7/10 NRS postprocedure pain 0/10 NRS in this region.  She also went to physical therapy and she did well with physical therapy at [facility].  The patient prefers to go to [facility] for her physical therapy.    She does have chronic bilateral aching axial low back pain and some lateral hip pain.  Denies numbness tingling or weakness.  Denies  functional deficits in regards to the back pain.  This is 3 out of 10 in intensity constant.  Not causing functional deficits.      The patient has done a provider driven home exercise program including the past 6 months.     Medications tried include gabapentin which had to be stopped because of side effects.  Patient is on Lyrica now. She is also use meloxicam and Flexeril.           ROS Red Flags :   Fever, Chills, Sweats: Denies  Involuntary Weight Loss: Denies  Bowel/Bladder Incontinence: Denies  Saddle Anesthesia: Denies        PMHx:   Past Medical History:   Diagnosis Date    Chronic low back pain     Hypertension     MVP (mitral valve prolapse)     Pericarditis 2010       PSHx:   Past Surgical History:   Procedure Laterality Date    MS INJ CERV/THORAC,W/ IMAGING N/A 1/10/2023    Procedure: C7-T1 interlaminar epidural steroid injection;  Surgeon: Marco Rios M.D.;  [facility];  Service: Pain Management    MS INJ CERV/THORAC,W/ IMAGING N/A 9/27/2022    Procedure: C7-T1 interlaminar epidural steroid injection;  Surgeon: Marco Rios M.D.;  Location: [facility];  Service: Pain Management    ABDOMINAL HYSTERECTOMY TOTAL      cervix remains and one ovary.     ARTHROPLASTY Bilateral     Total knee replacements    LAMINOTOMY      L4-5, L5-S1 fusion    OTHER ORTHOPEDIC SURGERY      spinal fusion    OTHER ORTHOPEDIC SURGERY      right knee replacement       Family history   Family History   Problem Relation Age of Onset    Heart Disease Mother     Heart Disease Father     Cancer Father     Alcohol abuse Father     Diabetes Sister     Diabetes Sister     Breast Cancer Maternal Grandmother     Dementia Paternal Grandmother          Medications:   Outpatient Medications Marked as Taking for the 4/3/23 encounter (Office Visit) with Marco Rios M.D.   Medication Sig Dispense Refill    pregabalin (LYRICA) 100 MG Cap Take 100 mg by mouth 2 times a day.       omeprazole (PRILOSEC) 40 MG delayed-release capsule TAKE 1 CAPSULE BY MOUTH EVERY DAY 90 Capsule 1    tretinoin (RETIN-A) 0.025 % cream APPLY PEA SIZED AMOUNT TOPICALLY TO FACE EVERY 3 NIGHTS. INCREASE TO EVERY NIGHT AS IRRITATION ALLOWS      simvastatin (ZOCOR) 20 MG Tab Take 1 Tablet by mouth every evening. 90 Tablet 2    RESTASIS 0.05 % ophthalmic emulsion ADMINISTER 1 DROP INTO BOTH EYES TWICE DAILY 180 Each 0    meloxicam (MOBIC) 15 MG tablet 1 tablet PO daily with food. No advil/aleve/motrin/ibuprofen on same day. 60 Tablet 1    cyclobenzaprine (FLEXERIL) 10 mg Tab Take 1 Tablet by mouth 3 times a day as needed. 30 Tablet 0    estradiol (ESTRACE) 1 MG Tab TAKE 1 TABLET BY MOUTH EVERY DAY 90 Tablet 3        Current Outpatient Medications on File Prior to Visit   Medication Sig Dispense Refill    pregabalin (LYRICA) 100 MG Cap Take 100 mg by mouth 2 times a day.      omeprazole (PRILOSEC) 40 MG delayed-release capsule TAKE 1 CAPSULE BY MOUTH EVERY DAY 90 Capsule 1    tretinoin (RETIN-A) 0.025 % cream APPLY PEA SIZED AMOUNT TOPICALLY TO FACE EVERY 3 NIGHTS. INCREASE TO EVERY NIGHT AS IRRITATION ALLOWS      simvastatin (ZOCOR) 20 MG Tab Take 1 Tablet by mouth every evening. 90 Tablet 2    RESTASIS 0.05 % ophthalmic emulsion ADMINISTER 1 DROP INTO BOTH EYES TWICE DAILY 180 Each 0    meloxicam (MOBIC) 15 MG tablet 1 tablet PO daily with food. No advil/aleve/motrin/ibuprofen on same day. 60 Tablet 1    cyclobenzaprine (FLEXERIL) 10 mg Tab Take 1 Tablet by mouth 3 times a day as needed. 30 Tablet 0    estradiol (ESTRACE) 1 MG Tab TAKE 1 TABLET BY MOUTH EVERY DAY 90 Tablet 3     No current facility-administered medications on file prior to visit.         Allergies:   No Known Allergies      Social Hx:   Social History     Socioeconomic History    Marital status:      Spouse name: Not on file    Number of children: Not on file    Years of education: Not on file    Highest education level: Not on file  "  Occupational History     Employer: OTHER     Comment: retired   Tobacco Use    Smoking status: Former     Years: 8.00     Types: Cigarettes     Quit date: 1988     Years since quittin.8    Smokeless tobacco: Never   Vaping Use    Vaping Use: Never used   Substance and Sexual Activity    Alcohol use: Yes     Alcohol/week: 0.0 oz     Comment: rare    Drug use: No    Sexual activity: Not Currently     Comment:    Other Topics Concern     Service No    Blood Transfusions No    Caffeine Concern No    Occupational Exposure No    Hobby Hazards No    Sleep Concern Yes     Comment: due to pain    Stress Concern Yes    Weight Concern No    Special Diet No    Back Care No    Exercise Yes    Bike Helmet No    Seat Belt Yes    Self-Exams Yes   Social History Narrative    Not on file     Social Determinants of Health     Financial Resource Strain: Not on file   Food Insecurity: Not on file   Transportation Needs: Not on file   Physical Activity: Not on file   Stress: Not on file   Social Connections: Not on file   Intimate Partner Violence: Not on file   Housing Stability: Not on file         EXAMINATION     Physical Exam:   Vitals: BP 92/68 (BP Location: Right arm, Patient Position: Sitting, BP Cuff Size: Adult)   Pulse 76   Temp 37.2 °C (98.9 °F) (Temporal)   Ht 1.6 m (5' 3\")   Wt 72.4 kg (159 lb 9.8 oz)   SpO2 98%     Constitutional:   Body Habitus: Body mass index is 28.27 kg/m².  Cooperation: Fully cooperates with exam  Appearance: Well-groomed no disheveled    Respiratory-  breathing comfortable on room air, no audible wheezing  Cardiovascular- capillary refills less than 2 seconds. No lower extremity edema is noted.   Psychiatric- alert and oriented ×3. Normal affect.    MSK and Neuro: -    No triggering of the hands.  Phalen's maneuver negative.  Strength 5 out of 5 in the bilateral upper extremities.  Sensation is intact.  Spurling's maneuver negative.    No tenderness palpation throughout " the spine.  Jong's maneuver negative bilaterally.      MEDICAL DECISION MAKING    DATA    Labs:   Lab Results   Component Value Date/Time    SODIUM 140 08/26/2022 11:58 AM    POTASSIUM 4.4 08/26/2022 11:58 AM    CHLORIDE 105 08/26/2022 11:58 AM    CO2 25 08/26/2022 11:58 AM    GLUCOSE 86 08/26/2022 11:58 AM    BUN 20 08/26/2022 11:58 AM    CREATININE 0.75 08/26/2022 11:58 AM        No results found for: PROTHROMBTM, INR     Lab Results   Component Value Date/Time    WBC 5.3 02/23/2022 01:24 PM    RBC 4.43 02/23/2022 01:24 PM    HEMOGLOBIN 13.7 02/23/2022 01:24 PM    HEMATOCRIT 41.3 02/23/2022 01:24 PM    MCV 93.2 02/23/2022 01:24 PM    MCH 30.9 02/23/2022 01:24 PM    MCHC 33.2 (L) 02/23/2022 01:24 PM    MPV 12.3 02/23/2022 01:24 PM    NEUTSPOLYS 61.50 02/23/2022 01:24 PM    LYMPHOCYTES 30.50 02/23/2022 01:24 PM    MONOCYTES 5.70 02/23/2022 01:24 PM    EOSINOPHILS 1.10 02/23/2022 01:24 PM    BASOPHILS 0.80 02/23/2022 01:24 PM        Lab Results   Component Value Date/Time    HBA1C 5.6 06/25/2014 11:27 AM          Imaging:   I personally reviewed following images    MRI cervical spine 6/ 17/2020  There is mild to moderate central canal stenosis at C5-6 with severe left and moderate right neuroforaminal stenosis.  There is mild central canal stenosis at C6-7 with moderate bilateral neuroforaminal stenosis.  There is facet arthropathy right at C2-3.  Also facet arthropathy right worse than left at C3-4, C4-5, C5-6.    I reviewed the following radiology reports                                 MRI lumbar spine 5/3/2022      Results for orders placed during the hospital encounter of 06/17/20   MR-CERVICAL SPINE-W/O    Impression 1.  Mild spinal canal narrowing at C5-6 and C6-7    2.  Foraminal stenoses at C5-6 and C6-7    3.  Multilevel disc bulge, endplate spurring, and facet arthropathy                     Results for orders placed during the hospital encounter of 06/02/20   DX-CERVICAL SPINE-WITH FLEX-EXT   7+     Impression 1.  No compression deformity or acute fracture.    2.  There is degenerative disc disease and facet arthropathy with bilateral osseous neural foraminal narrowing.    3.  No focal instability is noted on flexion extension views.                            ELECTRODIAGNOSTICS  EMG/nerve conduction study performed on  22  This is an abnormal study.  There is electrophysiologic evidence of mild right median neuropathy at the wrist (carpal tunnel syndrome).  There is no evidence of right cervical (C5-T1) radiculopathy.  Clinical correlation is recommended.                                                               DIAGNOSIS   Visit Diagnoses     ICD-10-CM   1. Cervical radiculopathy  M54.12   2. Cervical spinal stenosis  M48.02   3. Cervical spondylosis  M47.812   4. Trigger finger, right middle finger  M65.331   5. Carpal tunnel syndrome, right  G56.01   6. Chronic bilateral low back pain without sciatica  M54.50    G89.29   7. Chronic right hip pain  M25.551    G89.29   8. History of lumbosacral spine surgery L4-5 and L5-S1,   Z98.890         ASSESSMENT and PLAN:     Shawn Westbrook  1955,   female      Shawn was seen today for follow-up.    Diagnoses and all orders for this visit:    Cervical radiculopathy  Comments:  Stable, significantly improved on epidural steroid injection    Cervical spinal stenosis  Comments:  Stable, significantly improved on epidural steroid injection    Cervical spondylosis  Comments:  Stable, continue home exercise program    Trigger finger, right middle finger  Comments:  Stable, significantly improved after steroid injection    Carpal tunnel syndrome, right  Comments:  Stable, significantly improved after steroid injection    Chronic bilateral low back pain without sciatica  Comments:  Mild flare.  I do not believe this needs injections at this time.  I provided additional exercises    Chronic right hip pain    History of lumbosacral spine surgery L4-5 and  L5-S1, 1999      We discussed additions to the home exercise program.    Medications: The patient is stable on Lyrica 100 mg twice daily.  This was previously being managed by spine surgery.  This is causing a significant benefit for the patient's pain and function.  I believe at this time it is reasonable for transition to the patient's PCP Princess Mena M.D. for continued management of his medication.  I sent a HIPAA secure epic message as well at today's note to the patient's PCP.    Follow up: 2 to 3 months    Thank you for allowing me to participate in the care of this patient. If you have any questions please not hesitate to contact me.             Please note that this dictation was created using voice recognition software. I have made every reasonable attempt to correct obvious errors but there may be errors of grammar and content that I may have overlooked prior to finalization of this note.      Marco Rios MD  Interventional Spine and Sports Physiatry  Physical Medicine and Rehabilitation  [facility]

## 2023-04-03 NOTE — Clinical Note
Dear Princess Mena M.D. ,   Today I saw Shawn Westbrook in clinic.   Overall she is doing quite well.  She is stable on her current medications.  Please see the medications section of my note for more details.    Should you have any questions or concerns please do not hesitate to contact me.   Marco Rios M.D.

## 2023-07-05 DIAGNOSIS — H04.129 DRY EYE: ICD-10-CM

## 2023-07-05 RX ORDER — CYCLOSPORINE 0.5 MG/ML
EMULSION OPHTHALMIC
Qty: 60 EACH | Refills: 2 | Status: SHIPPED | OUTPATIENT
Start: 2023-07-05 | End: 2023-07-05 | Stop reason: SDUPTHER

## 2023-07-05 RX ORDER — CYCLOSPORINE 0.5 MG/ML
1 EMULSION OPHTHALMIC 2 TIMES DAILY
Qty: 180 EACH | Refills: 1 | Status: SHIPPED | OUTPATIENT
Start: 2023-07-05 | End: 2023-12-11

## 2023-07-06 NOTE — TELEPHONE ENCOUNTER
Received request via: Pharmacy    Was the patient seen in the last year in this department? Yes    Does the patient have an active prescription (recently filled or refills available) for medication(s) requested? No    Does the patient have shelter [facility] and need 100 day supply (blood pressure, diabetes and cholesterol meds only)? Patient does not have SCP  
no

## 2023-07-07 RX ORDER — OMEPRAZOLE 40 MG/1
40 CAPSULE, DELAYED RELEASE ORAL DAILY
Qty: 90 CAPSULE | Refills: 1 | Status: SHIPPED | OUTPATIENT
Start: 2023-07-07 | End: 2023-12-11 | Stop reason: SDUPTHER

## 2023-08-01 RX ORDER — MELOXICAM 15 MG/1
TABLET ORAL
Qty: 90 TABLET | Refills: 0 | Status: SHIPPED | OUTPATIENT
Start: 2023-08-01 | End: 2023-12-11 | Stop reason: SDUPTHER

## 2023-08-30 ENCOUNTER — APPOINTMENT (OUTPATIENT)
Dept: PHYSICAL MEDICINE AND REHAB | Facility: MEDICAL CENTER | Age: 68
End: 2023-08-30
Payer: MEDICARE

## 2023-09-14 ENCOUNTER — TELEPHONE (OUTPATIENT)
Dept: HEALTH INFORMATION MANAGEMENT | Facility: OTHER | Age: 68
End: 2023-09-14

## 2023-10-18 DIAGNOSIS — E78.2 MIXED HYPERLIPIDEMIA: ICD-10-CM

## 2023-10-18 DIAGNOSIS — M25.50 ARTHRALGIA, UNSPECIFIED JOINT: ICD-10-CM

## 2023-11-21 ENCOUNTER — HOSPITAL ENCOUNTER (OUTPATIENT)
Dept: LAB | Facility: MEDICAL CENTER | Age: 68
End: 2023-11-21
Attending: FAMILY MEDICINE
Payer: MEDICARE

## 2023-11-21 DIAGNOSIS — E78.2 MIXED HYPERLIPIDEMIA: ICD-10-CM

## 2023-11-21 DIAGNOSIS — M25.50 ARTHRALGIA, UNSPECIFIED JOINT: ICD-10-CM

## 2023-11-21 LAB
ALBUMIN SERPL BCP-MCNC: 3.9 G/DL (ref 3.2–4.9)
ALBUMIN/GLOB SERPL: 1.4 G/DL
ALP SERPL-CCNC: 74 U/L (ref 30–99)
ALT SERPL-CCNC: 19 U/L (ref 2–50)
ANION GAP SERPL CALC-SCNC: 8 MMOL/L (ref 7–16)
AST SERPL-CCNC: 19 U/L (ref 12–45)
BASOPHILS # BLD AUTO: 1.1 % (ref 0–1.8)
BASOPHILS # BLD: 0.05 K/UL (ref 0–0.12)
BILIRUB SERPL-MCNC: 0.3 MG/DL (ref 0.1–1.5)
BUN SERPL-MCNC: 15 MG/DL (ref 8–22)
CALCIUM ALBUM COR SERPL-MCNC: 9.4 MG/DL (ref 8.5–10.5)
CALCIUM SERPL-MCNC: 9.3 MG/DL (ref 8.5–10.5)
CHLORIDE SERPL-SCNC: 105 MMOL/L (ref 96–112)
CHOLEST SERPL-MCNC: 197 MG/DL (ref 100–199)
CO2 SERPL-SCNC: 27 MMOL/L (ref 20–33)
CREAT SERPL-MCNC: 0.73 MG/DL (ref 0.5–1.4)
CRP SERPL HS-MCNC: <0.3 MG/DL (ref 0–0.75)
EOSINOPHIL # BLD AUTO: 0.08 K/UL (ref 0–0.51)
EOSINOPHIL NFR BLD: 1.8 % (ref 0–6.9)
ERYTHROCYTE [DISTWIDTH] IN BLOOD BY AUTOMATED COUNT: 46.8 FL (ref 35.9–50)
ERYTHROCYTE [SEDIMENTATION RATE] IN BLOOD BY WESTERGREN METHOD: 16 MM/HOUR (ref 0–25)
FASTING STATUS PATIENT QL REPORTED: NORMAL
GFR SERPLBLD CREATININE-BSD FMLA CKD-EPI: 89 ML/MIN/1.73 M 2
GLOBULIN SER CALC-MCNC: 2.8 G/DL (ref 1.9–3.5)
GLUCOSE SERPL-MCNC: 86 MG/DL (ref 65–99)
HCT VFR BLD AUTO: 41 % (ref 37–47)
HDLC SERPL-MCNC: 74 MG/DL
HGB BLD-MCNC: 13.3 G/DL (ref 12–16)
IMM GRANULOCYTES # BLD AUTO: 0.01 K/UL (ref 0–0.11)
IMM GRANULOCYTES NFR BLD AUTO: 0.2 % (ref 0–0.9)
LDLC SERPL CALC-MCNC: 110 MG/DL
LYMPHOCYTES # BLD AUTO: 1.39 K/UL (ref 1–4.8)
LYMPHOCYTES NFR BLD: 30.7 % (ref 22–41)
MCH RBC QN AUTO: 30.5 PG (ref 27–33)
MCHC RBC AUTO-ENTMCNC: 32.4 G/DL (ref 32.2–35.5)
MCV RBC AUTO: 94 FL (ref 81.4–97.8)
MONOCYTES # BLD AUTO: 0.35 K/UL (ref 0–0.85)
MONOCYTES NFR BLD AUTO: 7.7 % (ref 0–13.4)
NEUTROPHILS # BLD AUTO: 2.65 K/UL (ref 1.82–7.42)
NEUTROPHILS NFR BLD: 58.5 % (ref 44–72)
NRBC # BLD AUTO: 0 K/UL
NRBC BLD-RTO: 0 /100 WBC (ref 0–0.2)
PLATELET # BLD AUTO: 277 K/UL (ref 164–446)
PMV BLD AUTO: 11.8 FL (ref 9–12.9)
POTASSIUM SERPL-SCNC: 4.3 MMOL/L (ref 3.6–5.5)
PROT SERPL-MCNC: 6.7 G/DL (ref 6–8.2)
RBC # BLD AUTO: 4.36 M/UL (ref 4.2–5.4)
RHEUMATOID FACT SER IA-ACNC: <10 IU/ML (ref 0–14)
SODIUM SERPL-SCNC: 140 MMOL/L (ref 135–145)
TRIGL SERPL-MCNC: 63 MG/DL (ref 0–149)
WBC # BLD AUTO: 4.5 K/UL (ref 4.8–10.8)

## 2023-11-21 PROCEDURE — 86038 ANTINUCLEAR ANTIBODIES: CPT

## 2023-11-21 PROCEDURE — 80053 COMPREHEN METABOLIC PANEL: CPT

## 2023-11-21 PROCEDURE — 80061 LIPID PANEL: CPT

## 2023-11-21 PROCEDURE — 85025 COMPLETE CBC W/AUTO DIFF WBC: CPT

## 2023-11-21 PROCEDURE — 85652 RBC SED RATE AUTOMATED: CPT

## 2023-11-21 PROCEDURE — 86431 RHEUMATOID FACTOR QUANT: CPT

## 2023-11-21 PROCEDURE — 36415 COLL VENOUS BLD VENIPUNCTURE: CPT

## 2023-11-21 PROCEDURE — 86140 C-REACTIVE PROTEIN: CPT

## 2023-11-23 LAB — NUCLEAR IGG SER QL IA: NORMAL

## 2023-12-11 ENCOUNTER — OFFICE VISIT (OUTPATIENT)
Dept: MEDICAL GROUP | Facility: PHYSICIAN GROUP | Age: 68
End: 2023-12-11
Payer: MEDICARE

## 2023-12-11 VITALS
RESPIRATION RATE: 16 BRPM | DIASTOLIC BLOOD PRESSURE: 64 MMHG | BODY MASS INDEX: 28.21 KG/M2 | HEART RATE: 70 BPM | TEMPERATURE: 98.2 F | WEIGHT: 159.2 LBS | OXYGEN SATURATION: 97 % | SYSTOLIC BLOOD PRESSURE: 110 MMHG | HEIGHT: 63 IN

## 2023-12-11 DIAGNOSIS — M79.671 RIGHT FOOT PAIN: ICD-10-CM

## 2023-12-11 DIAGNOSIS — I34.1 MVP (MITRAL VALVE PROLAPSE): ICD-10-CM

## 2023-12-11 DIAGNOSIS — M48.02 CERVICAL STENOSIS OF SPINE: ICD-10-CM

## 2023-12-11 PROCEDURE — 3078F DIAST BP <80 MM HG: CPT | Performed by: FAMILY MEDICINE

## 2023-12-11 PROCEDURE — 99214 OFFICE O/P EST MOD 30 MIN: CPT | Performed by: FAMILY MEDICINE

## 2023-12-11 PROCEDURE — 3074F SYST BP LT 130 MM HG: CPT | Performed by: FAMILY MEDICINE

## 2023-12-11 RX ORDER — PREGABALIN 100 MG/1
CAPSULE ORAL
Qty: 360 CAPSULE | Refills: 0 | Status: SHIPPED | OUTPATIENT
Start: 2023-12-11 | End: 2024-03-11

## 2023-12-11 RX ORDER — MELOXICAM 15 MG/1
TABLET ORAL
Qty: 90 TABLET | Refills: 1 | Status: SHIPPED | OUTPATIENT
Start: 2023-12-11

## 2023-12-11 RX ORDER — OMEPRAZOLE 40 MG/1
40 CAPSULE, DELAYED RELEASE ORAL DAILY
Qty: 90 CAPSULE | Refills: 1 | Status: SHIPPED | OUTPATIENT
Start: 2023-12-11

## 2023-12-11 ASSESSMENT — FIBROSIS 4 INDEX: FIB4 SCORE: 1.07

## 2023-12-11 NOTE — PROGRESS NOTES
Subjective:     CC:   Chief Complaint   Patient presents with    Follow-Up       HPI:   Shawn presents today for follow-up of her labs.  Patient never did see cardiology she feels that her mitral valve prolapse is unchanged.  Another reason for her to see cardiology at that time they were considering neck surgery but patient decided not to do it.  Patient has been seeing Dr. Stockton for injections and feels like she probably needs to see him back for follow-up.  Patient is requesting some Lyrica since she ran out she takes according to patient 2 of the 100 mg twice a day.  Patient was also complaining about her right foot she has been having foot pain she does not know if she may have had occult fracture because her  kind of fell on that foot.  I would recommend seeing an either [facility] or [facility] walk-in [facility] patient states that her  does see [facility] so she will go ahead and do that.  Patient also was requesting some tramadol I informed her that it is a controlled substance she did not realize that and we would have to determine why she is requesting it.  Patient at this time was encouraged to try to take the Lyrica which she has stopped taking and also see Dr. Rios for possible injection to her back.    Past Medical History:   Diagnosis Date    Chronic low back pain     Hypertension     MVP (mitral valve prolapse)     Pericarditis        Social History     Tobacco Use    Smoking status: Former     Current packs/day: 0.00     Types: Cigarettes     Start date: 1980     Quit date: 1988     Years since quittin.5    Smokeless tobacco: Never   Vaping Use    Vaping Use: Never used   Substance Use Topics    Alcohol use: Yes     Alcohol/week: 0.0 oz     Comment: rare    Drug use: No       Current Outpatient Medications Ordered in Epic   Medication Sig Dispense Refill    meloxicam (MOBIC) 15 MG tablet TAKE 1 TABLET BY MOUTH EVERY DAY WITH FOOD. NO ADVIL/ALEVE/MOTRIN/IBUPROFEN ON THE SAME DAY 90  "Tablet 1    omeprazole (PRILOSEC) 40 MG delayed-release capsule Take 1 Capsule by mouth every day. 90 Capsule 1    pregabalin (LYRICA) 100 MG Cap 2 tablets p.o. twice daily 360 Capsule 0    simvastatin (ZOCOR) 20 MG Tab TAKE 1 TABLET BY MOUTH EVERY EVENING 90 Tablet 1    tretinoin (RETIN-A) 0.025 % cream APPLY PEA SIZED AMOUNT TOPICALLY TO FACE EVERY 3 NIGHTS. INCREASE TO EVERY NIGHT AS IRRITATION ALLOWS      cyclobenzaprine (FLEXERIL) 10 mg Tab Take 1 Tablet by mouth 3 times a day as needed. 30 Tablet 0    estradiol (ESTRACE) 1 MG Tab TAKE 1 TABLET BY MOUTH EVERY DAY 90 Tablet 3    RESTASIS 0.05 % ophthalmic emulsion Administer 1 Drop into both eyes 2 times a day. 180 Each 1     No current [facility]-ordered facility-administered medications on file.       Allergies:  Patient has no known allergies.    Health Maintenance: Completed    ROS:  Gen: no fevers/chills, no changes in weight  Eyes: no changes in vision  ENT: no sore throat, no hearing loss, no bloody nose  Pulm: no sob, no cough  CV: no chest pain, no palpitations  GI: no nausea/vomiting, no diarrhea  Neuro: no headaches, no numbness/tingling  Heme/Lymph: no easy bruising    Objective:     Exam:  /64 (BP Location: Right arm, Patient Position: Sitting, BP Cuff Size: Adult)   Pulse 70   Temp 36.8 °C (98.2 °F) (Temporal)   Resp 16   Ht 1.6 m (5' 3\")   Wt 72.2 kg (159 lb 3.2 oz)   SpO2 97%   BMI 28.20 kg/m²  Body mass index is 28.2 kg/m².    Gen: Alert and oriented, No apparent distress.  Skin: Warm and dry.  No obvious lesions.  Eyes: Sclera wnl Pupils normal in size  Lungs: Normal effort, CTA bilaterally, no wheezes, rhonchi, or rales  CV: Regular rate and rhythm. No murmurs, rubs, or gallops.  Musculoskeletal: Normal gait. No extremity cyanosis, clubbing, or edema.  Examination of right foot I see  no redness or swelling  Neuro: Oriented to person, place and time  Psych: Mood is wnl     Assessment & Plan:     67 y.o. female with the following - "     1. Cervical stenosis of spine  Patient states that she was on Lyrica that helped her neck patient will schedule another follow-up appointment with Dr. Rios for injections.  - pregabalin (LYRICA) 100 MG Cap; 2 tablets p.o. twice daily  Dispense: 360 Capsule; Refill: 0    2. Right foot pain  Patient will go to us with walk-in to have them look at her foot since she has been unable to exercise because of her foot pain    3. MVP (mitral valve prolapse)  Patient feels she is not having issues with her heart denies any chest pain patient at this time does not think she really needs to see cardiology.    Other orders  - meloxicam (MOBIC) 15 MG tablet; TAKE 1 TABLET BY MOUTH EVERY DAY WITH FOOD. NO ADVIL/ALEVE/MOTRIN/IBUPROFEN ON THE SAME DAY  Dispense: 90 Tablet; Refill: 1  - omeprazole (PRILOSEC) 40 MG delayed-release capsule; Take 1 Capsule by mouth every day.  Dispense: 90 Capsule; Refill: 1       Return in about 3 months (around 3/11/2024), or if symptoms worsen or fail to improve.    Please note that this dictation was created using voice recognition software. I have made every reasonable attempt to correct obvious errors, but I expect that there are errors of grammar and possibly content that I did not discover before finalizing the note.

## 2024-01-11 ENCOUNTER — OFFICE VISIT (OUTPATIENT)
Dept: PHYSICAL MEDICINE AND REHAB | Facility: MEDICAL CENTER | Age: 69
End: 2024-01-11
Payer: MEDICARE

## 2024-01-11 VITALS
SYSTOLIC BLOOD PRESSURE: 114 MMHG | WEIGHT: 161 LBS | HEART RATE: 73 BPM | HEIGHT: 63 IN | TEMPERATURE: 98 F | OXYGEN SATURATION: 96 % | DIASTOLIC BLOOD PRESSURE: 72 MMHG | BODY MASS INDEX: 28.53 KG/M2

## 2024-01-11 DIAGNOSIS — M47.816 LUMBAR SPONDYLOSIS: ICD-10-CM

## 2024-01-11 DIAGNOSIS — Z98.890 HISTORY OF LUMBOSACRAL SPINE SURGERY: ICD-10-CM

## 2024-01-11 DIAGNOSIS — M54.16 LUMBAR RADICULOPATHY: ICD-10-CM

## 2024-01-11 DIAGNOSIS — M48.02 CERVICAL SPINAL STENOSIS: ICD-10-CM

## 2024-01-11 DIAGNOSIS — M54.12 CERVICAL RADICULOPATHY: ICD-10-CM

## 2024-01-11 DIAGNOSIS — M54.50 CHRONIC BILATERAL LOW BACK PAIN WITHOUT SCIATICA: ICD-10-CM

## 2024-01-11 DIAGNOSIS — M47.812 CERVICAL SPONDYLOSIS: ICD-10-CM

## 2024-01-11 DIAGNOSIS — G56.01 CARPAL TUNNEL SYNDROME, RIGHT: ICD-10-CM

## 2024-01-11 DIAGNOSIS — G89.29 CHRONIC BILATERAL LOW BACK PAIN WITHOUT SCIATICA: ICD-10-CM

## 2024-01-11 PROCEDURE — 99214 OFFICE O/P EST MOD 30 MIN: CPT | Performed by: PHYSICAL MEDICINE & REHABILITATION

## 2024-01-11 PROCEDURE — 3074F SYST BP LT 130 MM HG: CPT | Performed by: PHYSICAL MEDICINE & REHABILITATION

## 2024-01-11 PROCEDURE — 3078F DIAST BP <80 MM HG: CPT | Performed by: PHYSICAL MEDICINE & REHABILITATION

## 2024-01-11 PROCEDURE — 1125F AMNT PAIN NOTED PAIN PRSNT: CPT | Performed by: PHYSICAL MEDICINE & REHABILITATION

## 2024-01-11 ASSESSMENT — PATIENT HEALTH QUESTIONNAIRE - PHQ9
SUM OF ALL RESPONSES TO PHQ QUESTIONS 1-9: 8
5. POOR APPETITE OR OVEREATING: 1 - SEVERAL DAYS
CLINICAL INTERPRETATION OF PHQ2 SCORE: 2

## 2024-01-11 ASSESSMENT — PAIN SCALES - GENERAL: PAINLEVEL: 6=MODERATE PAIN

## 2024-01-11 ASSESSMENT — FIBROSIS 4 INDEX: FIB4 SCORE: 1.09

## 2024-01-11 NOTE — PROGRESS NOTES
Follow up patient note  Interventional spine and sports physiatry, Physical medicine rehabilitation      Chief complaint:   Chief Complaint   Patient presents with    Neck Pain          HISTORY    Please see new patient note by Dr Rios,  for more details.     HPI  Patient identification: Shawn Westbrook  1955,   female  With Diagnoses of Cervical radiculopathy, Cervical spinal stenosis, Cervical spondylosis, Carpal tunnel syndrome, bilateral, Lumbar radiculopathy, Lumbar spondylosis, Chronic bilateral low back pain without sciatica, and History of lumbosacral spine surgery L4-5 and L5-S1, 1999 were pertinent to this visit.     Procedures:  2020 C7-T1 interlaminar epidural steroid injection with 100% improvement in pain and function in regards to the neck and arm pain after the injection follow-up visit on 2020 C7-T1 cervical interlaminar epidural steroid injection initially with 60% improvement but this only lasted for a few weeks  11/10/2022 right long digit trigger finger injection 100% relief of triggering  11/10/2022 right carpal tunnel injection 100% pain relief  1/10/2023 C7-T1 cervical interlaminar epidural steroid injection 100% pain relief    Patient had excellent results after the epidural steroid injection of the cervical spine.  She had near complete resolution of pain with greater than 90% pain relief for approximately 11 months after the previous injection.  This pain has returned.  Pain is aching and radiating from the left side of her neck towards the left shoulder and to the left scapula.  This pain is 5 out of 10 in intensity during the day and 8 out of 10 intensity worse at night.  Chronic.    She also has bilateral low back pain radiating to the bilateral buttocks right worse than left and associated numbness tingling burning sensation in the bilateral feet including the dorsal aspect of the feet at the distal aspect of the bilateral feet.  This pain is also  worse at night.  5 out of 10 intensity during the day 8 out of 10 intensity at night.  Chronic.  Constant.  Present for greater than 3 months.      The patient has done a provider driven home exercise program including the past 6 months.     Medications tried include gabapentin which had to be stopped because of side effects.  Patient is on Lyrica now. She is also use meloxicam and Flexeril.           ROS Red Flags :   Fever, Chills, Sweats: Denies  Involuntary Weight Loss: Denies  Bowel/Bladder Incontinence: Denies  Saddle Anesthesia: Denies        PMHx:   Past Medical History:   Diagnosis Date    Chronic low back pain     Hypertension     MVP (mitral valve prolapse)     Pericarditis 2010       PSHx:   Past Surgical History:   Procedure Laterality Date    IN INJ CERV/THORAC,W/ IMAGING N/A 1/10/2023    Procedure: C7-T1 interlaminar epidural steroid injection;  Surgeon: Marco Rios M.D.;  [facility];  Service: Pain Management    IN INJ CERV/THORAC,W/ IMAGING N/A 9/27/2022    Procedure: C7-T1 interlaminar epidural steroid injection;  Surgeon: Marco Rios M.D.;  Location[facility];  Service: Pain Management    ABDOMINAL HYSTERECTOMY TOTAL      cervix remains and one ovary.     ARTHROPLASTY Bilateral     Total knee replacements    LAMINOTOMY      L4-5, L5-S1 fusion    OTHER ORTHOPEDIC SURGERY      spinal fusion    OTHER ORTHOPEDIC SURGERY      right knee replacement       Family history   Family History   Problem Relation Age of Onset    Heart Disease Mother     Heart Disease Father     Cancer Father     Alcohol abuse Father     Diabetes Sister     Diabetes Sister     Breast Cancer Maternal Grandmother     Dementia Paternal Grandmother          Medications:   Outpatient Medications Marked as Taking for the 1/11/24 encounter (Office Visit) with Marco Rios M.D.   Medication Sig Dispense Refill    methylPREDNISolone (MEDROL DOSEPAK) 4 MG Tablet Therapy Pack  Follow schedule on package instructions. 21 Tablet 0    meloxicam (MOBIC) 15 MG tablet TAKE 1 TABLET BY MOUTH EVERY DAY WITH FOOD. NO ADVIL/ALEVE/MOTRIN/IBUPROFEN ON THE SAME DAY 90 Tablet 1    omeprazole (PRILOSEC) 40 MG delayed-release capsule Take 1 Capsule by mouth every day. 90 Capsule 1    pregabalin (LYRICA) 100 MG Cap 2 tablets p.o. twice daily 360 Capsule 0    simvastatin (ZOCOR) 20 MG Tab TAKE 1 TABLET BY MOUTH EVERY EVENING 90 Tablet 1    tretinoin (RETIN-A) 0.025 % cream APPLY PEA SIZED AMOUNT TOPICALLY TO FACE EVERY 3 NIGHTS. INCREASE TO EVERY NIGHT AS IRRITATION ALLOWS      cyclobenzaprine (FLEXERIL) 10 mg Tab Take 1 Tablet by mouth 3 times a day as needed. 30 Tablet 0    estradiol (ESTRACE) 1 MG Tab TAKE 1 TABLET BY MOUTH EVERY DAY 90 Tablet 3        Current Outpatient Medications on File Prior to Visit   Medication Sig Dispense Refill    methylPREDNISolone (MEDROL DOSEPAK) 4 MG Tablet Therapy Pack Follow schedule on package instructions. 21 Tablet 0    meloxicam (MOBIC) 15 MG tablet TAKE 1 TABLET BY MOUTH EVERY DAY WITH FOOD. NO ADVIL/ALEVE/MOTRIN/IBUPROFEN ON THE SAME DAY 90 Tablet 1    omeprazole (PRILOSEC) 40 MG delayed-release capsule Take 1 Capsule by mouth every day. 90 Capsule 1    pregabalin (LYRICA) 100 MG Cap 2 tablets p.o. twice daily 360 Capsule 0    simvastatin (ZOCOR) 20 MG Tab TAKE 1 TABLET BY MOUTH EVERY EVENING 90 Tablet 1    tretinoin (RETIN-A) 0.025 % cream APPLY PEA SIZED AMOUNT TOPICALLY TO FACE EVERY 3 NIGHTS. INCREASE TO EVERY NIGHT AS IRRITATION ALLOWS      cyclobenzaprine (FLEXERIL) 10 mg Tab Take 1 Tablet by mouth 3 times a day as needed. 30 Tablet 0    estradiol (ESTRACE) 1 MG Tab TAKE 1 TABLET BY MOUTH EVERY DAY 90 Tablet 3     No current facility-administered medications on file prior to visit.         Allergies:   No Known Allergies      Social Hx:   Social History     Socioeconomic History    Marital status:      Spouse name: Not on file    Number of children:  "Not on file    Years of education: Not on file    Highest education level: Not on file   Occupational History     Employer: OTHER     Comment: retired   Tobacco Use    Smoking status: Former     Current packs/day: 0.00     Types: Cigarettes     Start date: 1980     Quit date: 1988     Years since quittin.6    Smokeless tobacco: Never   Vaping Use    Vaping Use: Never used   Substance and Sexual Activity    Alcohol use: Yes     Alcohol/week: 0.0 oz     Comment: rare    Drug use: No    Sexual activity: Not Currently     Comment:    Other Topics Concern     Service No    Blood Transfusions No    Caffeine Concern No    Occupational Exposure No    Hobby Hazards No    Sleep Concern Yes     Comment: due to pain    Stress Concern Yes    Weight Concern No    Special Diet No    Back Care No    Exercise Yes    Bike Helmet No    Seat Belt Yes    Self-Exams Yes   Social History Narrative    Not on file     Social Determinants of Health     Financial Resource Strain: Not on file   Food Insecurity: Not on file   Transportation Needs: Not on file   Physical Activity: Not on file   Stress: Not on file   Social Connections: Not on file   Intimate Partner Violence: Not on file   Housing Stability: Not on file         EXAMINATION     Physical Exam:   Vitals: /72 (BP Location: Right arm, Patient Position: Sitting, BP Cuff Size: Adult)   Pulse 73   Temp 36.7 °C (98 °F) (Temporal)   Ht 1.6 m (5' 3\")   Wt 73 kg (161 lb)   SpO2 96%     Constitutional:   Body Habitus: Body mass index is 28.52 kg/m².  Cooperation: Fully cooperates with exam  Appearance: Well-groomed no disheveled    Respiratory-  breathing comfortable on room air, no audible wheezing  Cardiovascular- capillary refills less than 2 seconds. No lower extremity edema is noted.   Psychiatric- alert and oriented ×3. Normal affect.    MSK and Neuro: -      Cervical spine  There are no signs of infection around the injection sites. "     decreased active range of motion with flexion, lateral flexion, and rotation bilaterally.   There is decreased active range of motion with cervical extension.      Palpation:   Tenderness to palpation throughout the cervical spine: negative bilaterally        Cervical spine Special tests  Neuro tension  Spurling's maneuver negative right, positive left    Cervical facet loading maneuver  negative right, positive left        Key points for the international standards for neurological classification of spinal cord injury (ISNCSCI) to light touch.     Dermatome R L   C4 2 2   C5 2 1   C6 2 2   C7 2 2   C8 2 2   T1 2 2   T2 2 2                                         Motor Exam Upper Extremities   ? Myotome R L   Shoulder flexion C5 5 4   Elbow flexion C5 5 4   Wrist extension C6 5 5   Elbow extension C7 5 5   Finger flexion C8 5 5   Finger abduction T1 5 5       Thoracic/Lumbar Spine/Sacral Spine/Hips   decreased active range of motion with flexion, lateral flexion, and rotation bilaterally.   There is decreased active range of motion with lumbar extension.    There is  pain with lumbar extension.   There is pain with facet loading maneuver (extension rotation) with axial low back pain on the BILATERAL side(s)       Palpation:   No tenderness to palpation in midline at T1-T12 levels. No tenderness to palpation in the left and right of the midline T1-L5, NEGATIVE for tenderness to palpation to the para-midline region in the lower lumbar levels.  palpation over SI joint: negative bilaterally    palpation in hip or over the gluteus medius tendon insertion: negative bilaterally      Lumbar spine Special tests  Neuro tension  Straight leg test positive bilaterally    Slump test positive bilaterally      Key points for the international standards for neurological classification of spinal cord injury (ISNCSCI) to light touch.     Dermatome R L                                      L2 2 2   L3 2 2   L4 2 2   L5 1 1   S1 2 2  "  S2 2 2         Motor Exam Lower Extremities    ? Myotome R L   Hip flexion L2 5 5   Knee extension L3 5 5   Ankle dorsiflexion L4 5 5   Toe extension L5 4 4   Ankle plantarflexion S1 5 5         MEDICAL DECISION MAKING    DATA    Labs:   Lab Results   Component Value Date/Time    SODIUM 140 11/21/2023 09:32 AM    POTASSIUM 4.3 11/21/2023 09:32 AM    CHLORIDE 105 11/21/2023 09:32 AM    CO2 27 11/21/2023 09:32 AM    GLUCOSE 86 11/21/2023 09:32 AM    BUN 15 11/21/2023 09:32 AM    CREATININE 0.73 11/21/2023 09:32 AM        No results found for: \"PROTHROMBTM\", \"INR\"     Lab Results   Component Value Date/Time    WBC 4.5 (L) 11/21/2023 09:32 AM    RBC 4.36 11/21/2023 09:32 AM    HEMOGLOBIN 13.3 11/21/2023 09:32 AM    HEMATOCRIT 41.0 11/21/2023 09:32 AM    MCV 94.0 11/21/2023 09:32 AM    MCH 30.5 11/21/2023 09:32 AM    MCHC 32.4 11/21/2023 09:32 AM    MPV 11.8 11/21/2023 09:32 AM    NEUTSPOLYS 58.50 11/21/2023 09:32 AM    LYMPHOCYTES 30.70 11/21/2023 09:32 AM    MONOCYTES 7.70 11/21/2023 09:32 AM    EOSINOPHILS 1.80 11/21/2023 09:32 AM    BASOPHILS 1.10 11/21/2023 09:32 AM        Lab Results   Component Value Date/Time    HBA1C 5.6 06/25/2014 11:27 AM          Imaging:   I personally reviewed following images    MRI cervical spine 6/ 17/2020  There is mild to moderate central canal stenosis at C5-6 with severe left and moderate right neuroforaminal stenosis.  There is mild central canal stenosis at C6-7 with moderate bilateral neuroforaminal stenosis.  There is facet arthropathy right at C2-3.  Also facet arthropathy right worse than left at C3-4, C4-5, C5-6.    I reviewed the following radiology reports                                 MRI lumbar spine 5/3/2022        Results for orders placed during the hospital encounter of 06/17/20   MR-CERVICAL SPINE-W/O    Impression 1.  Mild spinal canal narrowing at C5-6 and C6-7    2.  Foraminal stenoses at C5-6 and C6-7    3.  Multilevel disc bulge, endplate spurring, and facet " arthropathy                     Results for orders placed during the hospital encounter of 20   DX-CERVICAL SPINE-WITH FLEX-EXT   7+    Impression 1.  No compression deformity or acute fracture.    2.  There is degenerative disc disease and facet arthropathy with bilateral osseous neural foraminal narrowing.    3.  No focal instability is noted on flexion extension views.                            ELECTRODIAGNOSTICS  EMG/nerve conduction study performed on  22  This is an abnormal study.  There is electrophysiologic evidence of mild right median neuropathy at the wrist (carpal tunnel syndrome).  There is no evidence of right cervical (C5-T1) radiculopathy.  Clinical correlation is recommended.                                                               DIAGNOSIS   Visit Diagnoses     ICD-10-CM   1. Cervical radiculopathy  M54.12   2. Cervical spinal stenosis  M48.02   3. Cervical spondylosis  M47.812   4. Carpal tunnel syndrome, bilateral  G56.01   5. Lumbar radiculopathy  M54.16   6. Lumbar spondylosis  M47.816   7. Chronic bilateral low back pain without sciatica  M54.50    G89.29   8. History of lumbosacral spine surgery L4-5 and L5-S1,   Z98.890         ASSESSMENT and PLAN:     Shawn Westbrook  1955,   female      Shawn was seen today for neck pain.    Diagnoses and all orders for this visit:    Cervical radiculopathy  -     Referral to Physical Medicine Rehab  -     Referral to Physical Medicine Rehab    Cervical spinal stenosis    Cervical spondylosis    Carpal tunnel syndrome, bilateral  Comments:  bilateral wrist braces and stretches. consider carpal tunnel injection.    Lumbar radiculopathy  -     Referral to Physical Medicine Rehab  -     Referral to Physical Medicine Rehab    Lumbar spondylosis    Chronic bilateral low back pain without sciatica    History of lumbosacral spine surgery L4-5 and L5-S1,       The patient has no red flag signs on today's exam.  Specifically  the patient has no saddle anesthesia, bowel incontinence, bladder incontinence.  We discussed emergency precautions. The patient understands emergency precautions.       I believe the patient is having a recurrence of both her cervical radiculopathy and lumbar radiculopathy with more pain coming from the cervical radiculopathy.    We discussed options.  The patient wants to avoid surgery if possible.    I have ordered a left C7-T1 cervical interlaminar epidural steroid injection.  This is the procedure to be done first.    Approximately 1 month after this procedure we will plan to proceed with bilateral L5-S1 versus L4-5 transforaminal epidural steroid injection    The risks benefits and alternatives to this procedure were discussed and the patient wishes to proceed with the procedure. Risks include but are not limited to damage to surrounding structures, infection, bleeding, worsening of pain which can be permanent, weakness which can be permanent. Benefits include pain relief, improved function. Alternatives includes not doing the procedure.      Medications: Continue Lyrica as prescribed by PCP    Follow up: After the above procedure    Thank you for allowing me to participate in the care of this patient. If you have any questions please not hesitate to contact me.             Please note that this dictation was created using voice recognition software. I have made every reasonable attempt to correct obvious errors but there may be errors of grammar and content that I may have overlooked prior to finalization of this note.      Marco Rios MD  Interventional Spine and Sports Physiatry  Physical Medicine and Rehabilitation  [facility]

## 2024-01-11 NOTE — H&P (VIEW-ONLY)
Follow up patient note  Interventional spine and sports physiatry, Physical medicine rehabilitation      Chief complaint:   Chief Complaint   Patient presents with    Neck Pain          HISTORY    Please see new patient note by Dr Rios,  for more details.     HPI  Patient identification: Shawn Westbrook  1955,   female  With Diagnoses of Cervical radiculopathy, Cervical spinal stenosis, Cervical spondylosis, Carpal tunnel syndrome, bilateral, Lumbar radiculopathy, Lumbar spondylosis, Chronic bilateral low back pain without sciatica, and History of lumbosacral spine surgery L4-5 and L5-S1, 1999 were pertinent to this visit.     Procedures:  2020 C7-T1 interlaminar epidural steroid injection with 100% improvement in pain and function in regards to the neck and arm pain after the injection follow-up visit on 2020 C7-T1 cervical interlaminar epidural steroid injection initially with 60% improvement but this only lasted for a few weeks  11/10/2022 right long digit trigger finger injection 100% relief of triggering  11/10/2022 right carpal tunnel injection 100% pain relief  1/10/2023 C7-T1 cervical interlaminar epidural steroid injection 100% pain relief    Patient had excellent results after the epidural steroid injection of the cervical spine.  She had near complete resolution of pain with greater than 90% pain relief for approximately 11 months after the previous injection.  This pain has returned.  Pain is aching and radiating from the left side of her neck towards the left shoulder and to the left scapula.  This pain is 5 out of 10 in intensity during the day and 8 out of 10 intensity worse at night.  Chronic.    She also has bilateral low back pain radiating to the bilateral buttocks right worse than left and associated numbness tingling burning sensation in the bilateral feet including the dorsal aspect of the feet at the distal aspect of the bilateral feet.  This pain is also  worse at night.  5 out of 10 intensity during the day 8 out of 10 intensity at night.  Chronic.  Constant.  Present for greater than 3 months.      The patient has done a provider driven home exercise program including the past 6 months.     Medications tried include gabapentin which had to be stopped because of side effects.  Patient is on Lyrica now. She is also use meloxicam and Flexeril.           ROS Red Flags :   Fever, Chills, Sweats: Denies  Involuntary Weight Loss: Denies  Bowel/Bladder Incontinence: Denies  Saddle Anesthesia: Denies        PMHx:   Past Medical History:   Diagnosis Date    Chronic low back pain     Hypertension     MVP (mitral valve prolapse)     Pericarditis 2010       PSHx:   Past Surgical History:   Procedure Laterality Date    ND INJ CERV/THORAC,W/ IMAGING N/A 1/10/2023    Procedure: C7-T1 interlaminar epidural steroid injection;  Surgeon: Marco Rios M.D.;  [facility];  Service: Pain Management    ND INJ CERV/THORAC,W/ IMAGING N/A 9/27/2022    Procedure: C7-T1 interlaminar epidural steroid injection;  Surgeon: Marco Rios M.D.;  Location[facility];  Service: Pain Management    ABDOMINAL HYSTERECTOMY TOTAL      cervix remains and one ovary.     ARTHROPLASTY Bilateral     Total knee replacements    LAMINOTOMY      L4-5, L5-S1 fusion    OTHER ORTHOPEDIC SURGERY      spinal fusion    OTHER ORTHOPEDIC SURGERY      right knee replacement       Family history   Family History   Problem Relation Age of Onset    Heart Disease Mother     Heart Disease Father     Cancer Father     Alcohol abuse Father     Diabetes Sister     Diabetes Sister     Breast Cancer Maternal Grandmother     Dementia Paternal Grandmother          Medications:   Outpatient Medications Marked as Taking for the 1/11/24 encounter (Office Visit) with Marco Rios M.D.   Medication Sig Dispense Refill    methylPREDNISolone (MEDROL DOSEPAK) 4 MG Tablet Therapy Pack  Follow schedule on package instructions. 21 Tablet 0    meloxicam (MOBIC) 15 MG tablet TAKE 1 TABLET BY MOUTH EVERY DAY WITH FOOD. NO ADVIL/ALEVE/MOTRIN/IBUPROFEN ON THE SAME DAY 90 Tablet 1    omeprazole (PRILOSEC) 40 MG delayed-release capsule Take 1 Capsule by mouth every day. 90 Capsule 1    pregabalin (LYRICA) 100 MG Cap 2 tablets p.o. twice daily 360 Capsule 0    simvastatin (ZOCOR) 20 MG Tab TAKE 1 TABLET BY MOUTH EVERY EVENING 90 Tablet 1    tretinoin (RETIN-A) 0.025 % cream APPLY PEA SIZED AMOUNT TOPICALLY TO FACE EVERY 3 NIGHTS. INCREASE TO EVERY NIGHT AS IRRITATION ALLOWS      cyclobenzaprine (FLEXERIL) 10 mg Tab Take 1 Tablet by mouth 3 times a day as needed. 30 Tablet 0    estradiol (ESTRACE) 1 MG Tab TAKE 1 TABLET BY MOUTH EVERY DAY 90 Tablet 3        Current Outpatient Medications on File Prior to Visit   Medication Sig Dispense Refill    methylPREDNISolone (MEDROL DOSEPAK) 4 MG Tablet Therapy Pack Follow schedule on package instructions. 21 Tablet 0    meloxicam (MOBIC) 15 MG tablet TAKE 1 TABLET BY MOUTH EVERY DAY WITH FOOD. NO ADVIL/ALEVE/MOTRIN/IBUPROFEN ON THE SAME DAY 90 Tablet 1    omeprazole (PRILOSEC) 40 MG delayed-release capsule Take 1 Capsule by mouth every day. 90 Capsule 1    pregabalin (LYRICA) 100 MG Cap 2 tablets p.o. twice daily 360 Capsule 0    simvastatin (ZOCOR) 20 MG Tab TAKE 1 TABLET BY MOUTH EVERY EVENING 90 Tablet 1    tretinoin (RETIN-A) 0.025 % cream APPLY PEA SIZED AMOUNT TOPICALLY TO FACE EVERY 3 NIGHTS. INCREASE TO EVERY NIGHT AS IRRITATION ALLOWS      cyclobenzaprine (FLEXERIL) 10 mg Tab Take 1 Tablet by mouth 3 times a day as needed. 30 Tablet 0    estradiol (ESTRACE) 1 MG Tab TAKE 1 TABLET BY MOUTH EVERY DAY 90 Tablet 3     No current facility-administered medications on file prior to visit.         Allergies:   No Known Allergies      Social Hx:   Social History     Socioeconomic History    Marital status:      Spouse name: Not on file    Number of children:  "Not on file    Years of education: Not on file    Highest education level: Not on file   Occupational History     Employer: OTHER     Comment: retired   Tobacco Use    Smoking status: Former     Current packs/day: 0.00     Types: Cigarettes     Start date: 1980     Quit date: 1988     Years since quittin.6    Smokeless tobacco: Never   Vaping Use    Vaping Use: Never used   Substance and Sexual Activity    Alcohol use: Yes     Alcohol/week: 0.0 oz     Comment: rare    Drug use: No    Sexual activity: Not Currently     Comment:    Other Topics Concern     Service No    Blood Transfusions No    Caffeine Concern No    Occupational Exposure No    Hobby Hazards No    Sleep Concern Yes     Comment: due to pain    Stress Concern Yes    Weight Concern No    Special Diet No    Back Care No    Exercise Yes    Bike Helmet No    Seat Belt Yes    Self-Exams Yes   Social History Narrative    Not on file     Social Determinants of Health     Financial Resource Strain: Not on file   Food Insecurity: Not on file   Transportation Needs: Not on file   Physical Activity: Not on file   Stress: Not on file   Social Connections: Not on file   Intimate Partner Violence: Not on file   Housing Stability: Not on file         EXAMINATION     Physical Exam:   Vitals: /72 (BP Location: Right arm, Patient Position: Sitting, BP Cuff Size: Adult)   Pulse 73   Temp 36.7 °C (98 °F) (Temporal)   Ht 1.6 m (5' 3\")   Wt 73 kg (161 lb)   SpO2 96%     Constitutional:   Body Habitus: Body mass index is 28.52 kg/m².  Cooperation: Fully cooperates with exam  Appearance: Well-groomed no disheveled    Respiratory-  breathing comfortable on room air, no audible wheezing  Cardiovascular- capillary refills less than 2 seconds. No lower extremity edema is noted.   Psychiatric- alert and oriented ×3. Normal affect.    MSK and Neuro: -      Cervical spine  There are no signs of infection around the injection sites. "     decreased active range of motion with flexion, lateral flexion, and rotation bilaterally.   There is decreased active range of motion with cervical extension.      Palpation:   Tenderness to palpation throughout the cervical spine: negative bilaterally        Cervical spine Special tests  Neuro tension  Spurling's maneuver negative right, positive left    Cervical facet loading maneuver  negative right, positive left        Key points for the international standards for neurological classification of spinal cord injury (ISNCSCI) to light touch.     Dermatome R L   C4 2 2   C5 2 1   C6 2 2   C7 2 2   C8 2 2   T1 2 2   T2 2 2                                         Motor Exam Upper Extremities   ? Myotome R L   Shoulder flexion C5 5 4   Elbow flexion C5 5 4   Wrist extension C6 5 5   Elbow extension C7 5 5   Finger flexion C8 5 5   Finger abduction T1 5 5       Thoracic/Lumbar Spine/Sacral Spine/Hips   decreased active range of motion with flexion, lateral flexion, and rotation bilaterally.   There is decreased active range of motion with lumbar extension.    There is  pain with lumbar extension.   There is pain with facet loading maneuver (extension rotation) with axial low back pain on the BILATERAL side(s)       Palpation:   No tenderness to palpation in midline at T1-T12 levels. No tenderness to palpation in the left and right of the midline T1-L5, NEGATIVE for tenderness to palpation to the para-midline region in the lower lumbar levels.  palpation over SI joint: negative bilaterally    palpation in hip or over the gluteus medius tendon insertion: negative bilaterally      Lumbar spine Special tests  Neuro tension  Straight leg test positive bilaterally    Slump test positive bilaterally      Key points for the international standards for neurological classification of spinal cord injury (ISNCSCI) to light touch.     Dermatome R L                                      L2 2 2   L3 2 2   L4 2 2   L5 1 1   S1 2 2  "  S2 2 2         Motor Exam Lower Extremities    ? Myotome R L   Hip flexion L2 5 5   Knee extension L3 5 5   Ankle dorsiflexion L4 5 5   Toe extension L5 4 4   Ankle plantarflexion S1 5 5         MEDICAL DECISION MAKING    DATA    Labs:   Lab Results   Component Value Date/Time    SODIUM 140 11/21/2023 09:32 AM    POTASSIUM 4.3 11/21/2023 09:32 AM    CHLORIDE 105 11/21/2023 09:32 AM    CO2 27 11/21/2023 09:32 AM    GLUCOSE 86 11/21/2023 09:32 AM    BUN 15 11/21/2023 09:32 AM    CREATININE 0.73 11/21/2023 09:32 AM        No results found for: \"PROTHROMBTM\", \"INR\"     Lab Results   Component Value Date/Time    WBC 4.5 (L) 11/21/2023 09:32 AM    RBC 4.36 11/21/2023 09:32 AM    HEMOGLOBIN 13.3 11/21/2023 09:32 AM    HEMATOCRIT 41.0 11/21/2023 09:32 AM    MCV 94.0 11/21/2023 09:32 AM    MCH 30.5 11/21/2023 09:32 AM    MCHC 32.4 11/21/2023 09:32 AM    MPV 11.8 11/21/2023 09:32 AM    NEUTSPOLYS 58.50 11/21/2023 09:32 AM    LYMPHOCYTES 30.70 11/21/2023 09:32 AM    MONOCYTES 7.70 11/21/2023 09:32 AM    EOSINOPHILS 1.80 11/21/2023 09:32 AM    BASOPHILS 1.10 11/21/2023 09:32 AM        Lab Results   Component Value Date/Time    HBA1C 5.6 06/25/2014 11:27 AM          Imaging:   I personally reviewed following images    MRI cervical spine 6/ 17/2020  There is mild to moderate central canal stenosis at C5-6 with severe left and moderate right neuroforaminal stenosis.  There is mild central canal stenosis at C6-7 with moderate bilateral neuroforaminal stenosis.  There is facet arthropathy right at C2-3.  Also facet arthropathy right worse than left at C3-4, C4-5, C5-6.    I reviewed the following radiology reports                                 MRI lumbar spine 5/3/2022        Results for orders placed during the hospital encounter of 06/17/20   MR-CERVICAL SPINE-W/O    Impression 1.  Mild spinal canal narrowing at C5-6 and C6-7    2.  Foraminal stenoses at C5-6 and C6-7    3.  Multilevel disc bulge, endplate spurring, and facet " arthropathy                     Results for orders placed during the hospital encounter of 20   DX-CERVICAL SPINE-WITH FLEX-EXT   7+    Impression 1.  No compression deformity or acute fracture.    2.  There is degenerative disc disease and facet arthropathy with bilateral osseous neural foraminal narrowing.    3.  No focal instability is noted on flexion extension views.                            ELECTRODIAGNOSTICS  EMG/nerve conduction study performed on  22  This is an abnormal study.  There is electrophysiologic evidence of mild right median neuropathy at the wrist (carpal tunnel syndrome).  There is no evidence of right cervical (C5-T1) radiculopathy.  Clinical correlation is recommended.                                                               DIAGNOSIS   Visit Diagnoses     ICD-10-CM   1. Cervical radiculopathy  M54.12   2. Cervical spinal stenosis  M48.02   3. Cervical spondylosis  M47.812   4. Carpal tunnel syndrome, bilateral  G56.01   5. Lumbar radiculopathy  M54.16   6. Lumbar spondylosis  M47.816   7. Chronic bilateral low back pain without sciatica  M54.50    G89.29   8. History of lumbosacral spine surgery L4-5 and L5-S1,   Z98.890         ASSESSMENT and PLAN:     Shawn Westbrook  1955,   female      Shawn was seen today for neck pain.    Diagnoses and all orders for this visit:    Cervical radiculopathy  -     Referral to Physical Medicine Rehab  -     Referral to Physical Medicine Rehab    Cervical spinal stenosis    Cervical spondylosis    Carpal tunnel syndrome, bilateral  Comments:  bilateral wrist braces and stretches. consider carpal tunnel injection.    Lumbar radiculopathy  -     Referral to Physical Medicine Rehab  -     Referral to Physical Medicine Rehab    Lumbar spondylosis    Chronic bilateral low back pain without sciatica    History of lumbosacral spine surgery L4-5 and L5-S1,       The patient has no red flag signs on today's exam.  Specifically  the patient has no saddle anesthesia, bowel incontinence, bladder incontinence.  We discussed emergency precautions. The patient understands emergency precautions.       I believe the patient is having a recurrence of both her cervical radiculopathy and lumbar radiculopathy with more pain coming from the cervical radiculopathy.    We discussed options.  The patient wants to avoid surgery if possible.    I have ordered a left C7-T1 cervical interlaminar epidural steroid injection.  This is the procedure to be done first.    Approximately 1 month after this procedure we will plan to proceed with bilateral L5-S1 versus L4-5 transforaminal epidural steroid injection    The risks benefits and alternatives to this procedure were discussed and the patient wishes to proceed with the procedure. Risks include but are not limited to damage to surrounding structures, infection, bleeding, worsening of pain which can be permanent, weakness which can be permanent. Benefits include pain relief, improved function. Alternatives includes not doing the procedure.      Medications: Continue Lyrica as prescribed by PCP    Follow up: After the above procedure    Thank you for allowing me to participate in the care of this patient. If you have any questions please not hesitate to contact me.             Please note that this dictation was created using voice recognition software. I have made every reasonable attempt to correct obvious errors but there may be errors of grammar and content that I may have overlooked prior to finalization of this note.      Marco Rios MD  Interventional Spine and Sports Physiatry  Physical Medicine and Rehabilitation  [facility]

## 2024-01-16 ENCOUNTER — HOSPITAL ENCOUNTER (OUTPATIENT)
Facility: REHABILITATION | Age: 69
End: 2024-01-16
Attending: PHYSICAL MEDICINE & REHABILITATION | Admitting: PHYSICAL MEDICINE & REHABILITATION
Payer: MEDICARE

## 2024-01-16 ENCOUNTER — TELEPHONE (OUTPATIENT)
Dept: PHYSICAL MEDICINE AND REHAB | Facility: MEDICAL CENTER | Age: 69
End: 2024-01-16
Payer: MEDICARE

## 2024-01-16 ENCOUNTER — APPOINTMENT (OUTPATIENT)
Dept: RADIOLOGY | Facility: REHABILITATION | Age: 69
End: 2024-01-16
Attending: PHYSICAL MEDICINE & REHABILITATION
Payer: MEDICARE

## 2024-01-16 VITALS
SYSTOLIC BLOOD PRESSURE: 132 MMHG | WEIGHT: 165.12 LBS | HEART RATE: 68 BPM | HEIGHT: 63 IN | TEMPERATURE: 98 F | BODY MASS INDEX: 29.26 KG/M2 | OXYGEN SATURATION: 98 % | DIASTOLIC BLOOD PRESSURE: 84 MMHG | RESPIRATION RATE: 16 BRPM

## 2024-01-16 DIAGNOSIS — M48.02 CERVICAL SPINAL STENOSIS: ICD-10-CM

## 2024-01-16 DIAGNOSIS — M47.812 CERVICAL SPONDYLOSIS: ICD-10-CM

## 2024-01-16 DIAGNOSIS — M47.816 LUMBAR SPONDYLOSIS: ICD-10-CM

## 2024-01-16 DIAGNOSIS — M54.12 CERVICAL RADICULOPATHY: ICD-10-CM

## 2024-01-16 DIAGNOSIS — M54.16 LUMBAR RADICULOPATHY: ICD-10-CM

## 2024-01-16 PROCEDURE — 700111 HCHG RX REV CODE 636 W/ 250 OVERRIDE (IP): Mod: JZ

## 2024-01-16 PROCEDURE — 700117 HCHG RX CONTRAST REV CODE 255

## 2024-01-16 PROCEDURE — 62321 NJX INTERLAMINAR CRV/THRC: CPT

## 2024-01-16 RX ORDER — LIDOCAINE HYDROCHLORIDE 10 MG/ML
INJECTION, SOLUTION EPIDURAL; INFILTRATION; INTRACAUDAL; PERINEURAL
Status: COMPLETED
Start: 2024-01-16 | End: 2024-01-16

## 2024-01-16 RX ORDER — DEXAMETHASONE SODIUM PHOSPHATE 10 MG/ML
INJECTION, SOLUTION INTRAMUSCULAR; INTRAVENOUS
Status: COMPLETED
Start: 2024-01-16 | End: 2024-01-16

## 2024-01-16 RX ORDER — CYCLOBENZAPRINE HCL 10 MG
10 TABLET ORAL 3 TIMES DAILY PRN
Qty: 90 TABLET | Refills: 0 | Status: SHIPPED | OUTPATIENT
Start: 2024-01-16

## 2024-01-16 RX ADMIN — DEXAMETHASONE SODIUM PHOSPHATE 10 MG: 10 INJECTION, SOLUTION INTRAMUSCULAR; INTRAVENOUS at 10:06

## 2024-01-16 RX ADMIN — LIDOCAINE HYDROCHLORIDE 10 ML: 10 INJECTION, SOLUTION EPIDURAL; INFILTRATION; INTRACAUDAL; PERINEURAL at 10:07

## 2024-01-16 RX ADMIN — IOHEXOL 10 ML: 240 INJECTION, SOLUTION INTRATHECAL; INTRAVASCULAR; INTRAVENOUS; ORAL at 10:06

## 2024-01-16 ASSESSMENT — PAIN DESCRIPTION - PAIN TYPE
TYPE: CHRONIC PAIN

## 2024-01-16 ASSESSMENT — FIBROSIS 4 INDEX: FIB4 SCORE: 1.09

## 2024-01-16 NOTE — OR SURGEON
Immediate Post OP Note    Pre-Op Diagnosis Codes:     * Cervical radiculopathy [M54.12]      Post-Op Diagnosis Codes:     * Cervical radiculopathy [M54.12]      Procedure(s):  LEFT cervical C7-T1 interlaminar epidural steroid injection.    - Wound Class: Clean    Surgeon(s):  Marco Rios M.D.    Anesthesiologist/Type of Anesthesia:  No anesthesia staff entered./Local    Surgical Staff:  Circulator: Patricia Condon R.N.  Scrub Person: Kusum Machado R.N.  Radiology Technologist: Hussain Mattson    Specimens removed if any:  * No specimens in log *    Estimated Blood Loss: None    Findings: None    Complications: None        1/16/2024 10:18 AM Marco Rios M.D.

## 2024-01-16 NOTE — TELEPHONE ENCOUNTER
Caller Name: Shawn  Call Back Number: [phone]    How would the patient prefer to be contacted with a response: Phone call OK to leave a detailed message    Pt req't Rx refill for her Flexeril to be send to her pharmacy on file.    Thank you  Maddy

## 2024-01-16 NOTE — INTERVAL H&P NOTE
H&P reviewed. The patient was examined and there are no changes to the H&P     Lungs clear to auscultation bilaterally.  No abdominal tenderness.  Heart regular rate and rhythm.  Vitals reviewed.    Proceed with the originally scheduled procedure.  The risks, benefits and alternatives were discussed.  The patient understands these.    Pre-Op Diagnosis Codes:     * Cervical radiculopathy [M54.12]  Procedure(s):  LEFT cervical C7-T1 interlaminar epidural steroid injection.         Marco Rios MD  Physical Medicine and Rehabilitation  Interventional Spine and Sports Physiatry  [facility]

## 2024-01-16 NOTE — OP REPORT
Date of Service: 1/16/2024    Physician/s: Marco Rois MD    Pre-operative Diagnosis: Cervical radiculopathy    Post-operative Diagnosis: Cervical radiculopathy    Procedure: C7-T1 LEFT Cervical interlaminar epidural steroid injection    Description of procedure:    The risks, benefits, and alternatives of the procedure were reviewed and discussed with the patient.  Written informed consent was freely obtained. A pre-procedural time-out was conducted by the physician verifying patient’s identity, procedure to be performed, procedure site and side, and allergy verification. Appropriate equipment was determined to be in place for the procedure.         The patient's vital signs were carefully monitored before, throughout, and after the procedure.     In the fluoroscopy suite the patient was placed in a prone position, a pillow placed underneath their chest. The skin was prepped and draped in the usual sterile fashion. The fluoroscope was placed over the cervical neck at the appropriate injection AP angle view, and the target for injection was marked. A 25g needle was placed into the marked site, and approx 2mL of 1% Lidocaine was injected subcutaneously into the epidermal and dermal layers. The needle was removed. A 20g Tuohy needle was then placed and advanced under fluoroscopic guidance into the LEFT C7-T1 interlaminar space at both the initial position AP view and contralateral oblique at a lateral view to ensure proper location of the needle tip at all times.  The needle was advanced with fluoroscopic guidance to the superior aspect of the T1 lamina.  Then a contralateral oblique view was used to advance the needle slightly towards the epidural space, A loss-of-resistance technique was used to guide the needle into the epidural space in a lateral fluoroscopic view and confirmed with loss of resistance with sterile normal saline. contrast dye was used to highlight the epidural space spread while the fluoroscope  was running live. Following negative aspiration, 1mL of 10mg/mL of dexamethasone followed by 2 mL of sterile saline . The needle was removed intact after restyleted. The patient's back was covered with a 4x4 gauze, the area was cleansed with sterile normal saline, and a dressing was applied. There were no complications noted.     The patient was then evaluated post-procedure, and was hemodynamically stable prior to leaving the post-operative care unit.       The patient had greater than 70 percent pain relief postprocedure  Preprocedure pain 7/10 NRS  Postprocedure pain 2 /10 NRS    Follow-up as scheduled    Marco Rios MD  Interventional Spine and Pain  Physical Medicine and Rehabilitation  [facility]        CPT  interlaminar cervical/thoracic epidural: [id number]

## 2024-01-17 ENCOUNTER — TELEPHONE (OUTPATIENT)
Dept: PHYSICAL MEDICINE AND REHAB | Facility: MEDICAL CENTER | Age: 69
End: 2024-01-17
Payer: MEDICARE

## 2024-01-17 NOTE — TELEPHONE ENCOUNTER
Called for Post -SP check up: left cervical C7-T1 interlaminar epidural steroid injection     Change in pain:    Concerns?    Confirmed [facility] appt?

## 2024-02-13 ENCOUNTER — APPOINTMENT (OUTPATIENT)
Dept: RADIOLOGY | Facility: REHABILITATION | Age: 69
End: 2024-02-13
Attending: PHYSICAL MEDICINE & REHABILITATION
Payer: MEDICARE

## 2024-02-13 ENCOUNTER — HOSPITAL ENCOUNTER (OUTPATIENT)
Facility: REHABILITATION | Age: 69
End: 2024-02-13
Attending: PHYSICAL MEDICINE & REHABILITATION | Admitting: PHYSICAL MEDICINE & REHABILITATION
Payer: MEDICARE

## 2024-02-13 VITALS
BODY MASS INDEX: 29.14 KG/M2 | RESPIRATION RATE: 16 BRPM | HEART RATE: 70 BPM | HEIGHT: 63 IN | SYSTOLIC BLOOD PRESSURE: 125 MMHG | OXYGEN SATURATION: 95 % | TEMPERATURE: 98.2 F | WEIGHT: 164.46 LBS | DIASTOLIC BLOOD PRESSURE: 83 MMHG

## 2024-02-13 PROCEDURE — 64483 NJX AA&/STRD TFRM EPI L/S 1: CPT

## 2024-02-13 PROCEDURE — 700117 HCHG RX CONTRAST REV CODE 255

## 2024-02-13 PROCEDURE — 700111 HCHG RX REV CODE 636 W/ 250 OVERRIDE (IP): Mod: JZ

## 2024-02-13 RX ORDER — LIDOCAINE HYDROCHLORIDE 10 MG/ML
INJECTION, SOLUTION EPIDURAL; INFILTRATION; INTRACAUDAL; PERINEURAL
Status: COMPLETED
Start: 2024-02-13 | End: 2024-02-13

## 2024-02-13 RX ORDER — DEXAMETHASONE SODIUM PHOSPHATE 10 MG/ML
INJECTION, SOLUTION INTRAMUSCULAR; INTRAVENOUS
Status: COMPLETED
Start: 2024-02-13 | End: 2024-02-13

## 2024-02-13 RX ADMIN — IOHEXOL 3 ML: 240 INJECTION, SOLUTION INTRATHECAL; INTRAVASCULAR; INTRAVENOUS; ORAL at 10:37

## 2024-02-13 RX ADMIN — LIDOCAINE HYDROCHLORIDE 10 ML: 10 INJECTION, SOLUTION EPIDURAL; INFILTRATION; INTRACAUDAL; PERINEURAL at 10:35

## 2024-02-13 RX ADMIN — DEXAMETHASONE SODIUM PHOSPHATE 10 MG: 10 INJECTION, SOLUTION INTRAMUSCULAR; INTRAVENOUS at 10:37

## 2024-02-13 ASSESSMENT — FIBROSIS 4 INDEX: FIB4 SCORE: 1.09

## 2024-02-13 ASSESSMENT — PAIN DESCRIPTION - PAIN TYPE
TYPE: CHRONIC PAIN
TYPE: CHRONIC PAIN

## 2024-02-13 NOTE — PROGRESS NOTES
0903 Pt received to pre procedure area. ID band and allergies verified. Vital signs taken and stable. Verified that patient has not taken NSAIDS, anticoagulants or blood thinners in past 5 days. Pt's history reviewed. Reviewed post op instructions with patient, questions answered, verbalized understanding. Pt seen by Dr. Rios  pre procedure discussed, questions answered.     1043  Pt received to recovery area, report received from procedure RN Kathie . Vitals taken and stable. Patient tolerated po fluids and snack without difficulty. Dressing clean, dry and intact. Ice pack applied over dressing.     1100 Pt seen by Dr. Rios post procedure, orders received for discharge. Patient ambulatory without difficulty. Pt discharged to designated .

## 2024-02-13 NOTE — OP REPORT
Date of Service: 2/13/2024     Patient: Shawn Westbrook 68 y.o. female     MRN: [id number]     Physician/s: Marco Rios MD    Pre-operative Diagnosis: Lumbar radiculopathy    Post-operative Diagnosis: Lumbar radiculopathy    Procedure: bilateral  Lumbar Transforaminal Epidural Steroid  at the L5-S1 levels.     Description of procedure:    The risks, benefits, and alternatives of the procedure were reviewed and discussed with the patient.  Written informed consent was freely obtained. A pre-procedural time-out was conducted by the physician verifying patient’s identity, procedure to be performed, procedure site and side, and allergy verification. Appropriate equipment was determined to be in place for the procedure.         The patient's vital signs were carefully monitored before, throughout, and after the procedure.     In the fluoroscopy suite the patient was placed in a prone position, a pillow placed underneath their umbilicus. The skin was prepped and draped in the usual sterile fashion.     The fluoroscope was placed over the lumbar spine and adjusted into the proper AP/Oblique view to enter the transforaminal space just adjacent to the pedicle at the levels below. The targets for injection were then marked at the left L5-S1. A 27g 1.5 inch needle was placed into the marked site, and 1mL of 1% Lidocaine was injected subcutaneously into the epidermal and dermal layers. The needle was removed intact.  A 22g 5 inch spinal needle was then placed and advanced under fluoroscopic guidance in an oblique view towards the epidural space of the levels noted above. The needle position was confirmed to not be past the 6 o'clock position in the AP view and it was in the neuroforamen in the lateral view.        The fluoroscope was was then adjusted over the lumbar spine and adjusted into the proper AP/Oblique view to enter the transforaminal space adjacent to the pedicle at the RIGHT  L5-S1. A 27g 1.5 inch needle was  placed into the marked site, and 1mL of 1% Lidocaine was injected subcutaneously into the epidermal and dermal layers. The needle was removed intact.  A 22g 5 inch spinal needle was then placed and advanced under fluoroscopic guidance in an oblique view towards the epidural space of the levels noted above. The needle position was confirmed to not be past the 6 o'clock position in the AP view and it was in the neuroforamen in the lateral view.       Under live fluoroscopic guidance in the AP view, contrast dye was used to highlight the epidural space spread of each level above. Final fluoroscopic images were saved.  Following negative aspiration, 1mL of 1% lidocaine preservative free with 5mg dexamethasone   was then injected at each level above, and the needles were removed intact after restyleted. The patient's back was covered with a 4x4 gauze, the area was cleansed with sterile normal saline, and a dressing was applied. There were no complications noted.     The patient was then evaluated post-procedure, and was hemodynamically stable prior to leaving the post-operative care unit.     The patient had 100% pain relief postprocedure  Preprocedural pain: 7/10 NRS  Postprocedural pain: 0/10 NRS    Follow-up as scheduled    Marco Rios MD  Interventional Spine and Pain  Physical Medicine and Rehabilitation  [facility]          CPT codes  Transforaminal epidural injection- lumbar or sacral (first level):  08289-77

## 2024-02-13 NOTE — H&P
Physical medicine and rehabilitation preprocedure history & Physical Note    Date  2/13/2024    Primary Care Physician  Princess Mena M.D.    CC  Pre-Op Diagnosis Codes:     * Lumbar radiculopathy [M54.16]     HPI  This is a 68 y.o. female who presented with bilateral low back pain radiating down the bilateral legs.    See previous notes of Dr. Rios    Past Medical History:   Diagnosis Date    Chronic low back pain     Hypertension     MVP (mitral valve prolapse)     Pericarditis 2010       Past Surgical History:   Procedure Laterality Date    IL INJ CERV/THORAC,W/ IMAGING Left 1/16/2024    Procedure: LEFT cervical C7-T1 interlaminar epidural steroid injection.    Note: cervical approximately 1 month prior to the lumbar;  Surgeon: Marco Rios M.D.;  [facility];  Service: Pain Management    IL INJ CERV/THORAC,W/ IMAGING N/A 1/10/2023    Procedure: C7-T1 interlaminar epidural steroid injection;  Surgeon: Marco Rios M.D.;  Location[facility];  Service: Pain Management    IL INJ CERV/THORAC,W/ IMAGING N/A 9/27/2022    Procedure: C7-T1 interlaminar epidural steroid injection;  Surgeon: Marco Rios M.D.;  Location: [facility];  Service: Pain Management    ABDOMINAL HYSTERECTOMY TOTAL      cervix remains and one ovary.     ARTHROPLASTY Bilateral     Total knee replacements    LAMINOTOMY      L4-5, L5-S1 fusion    OTHER ORTHOPEDIC SURGERY      spinal fusion    OTHER ORTHOPEDIC SURGERY      right knee replacement       Current Facility-Administered Medications   Medication Dose Route Frequency Provider Last Rate Last Admin    DEXAMETHASONE SOD PHOSPHATE PF 10 MG/ML INJ SOLN             LIDOCAINE HCL (PF) 1 % INJ SOLN             IOHEXOL 240 MG/ML INJ SOLN                Social History     Socioeconomic History    Marital status:      Spouse name: Not on file    Number of children: Not on file    Years of education: Not on file     Highest education level: Not on file   Occupational History     Employer: OTHER     Comment: retired   Tobacco Use    Smoking status: Former     Current packs/day: 0.00     Types: Cigarettes     Start date: 1980     Quit date: 1988     Years since quittin.7    Smokeless tobacco: Never   Vaping Use    Vaping Use: Never used   Substance and Sexual Activity    Alcohol use: Yes     Alcohol/week: 0.0 oz     Comment: rare    Drug use: No    Sexual activity: Not Currently     Comment:    Other Topics Concern     Service No    Blood Transfusions No    Caffeine Concern No    Occupational Exposure No    Hobby Hazards No    Sleep Concern Yes     Comment: due to pain    Stress Concern Yes    Weight Concern No    Special Diet No    Back Care No    Exercise Yes    Bike Helmet No    Seat Belt Yes    Self-Exams Yes   Social History Narrative    Not on file     Social Determinants of Health     Financial Resource Strain: Not on file   Food Insecurity: Not on file   Transportation Needs: Not on file   Physical Activity: Not on file   Stress: Not on file   Social Connections: Not on file   Intimate Partner Violence: Not on file   Housing Stability: Not on file       Family History   Problem Relation Age of Onset    Heart Disease Mother     Heart Disease Father     Cancer Father     Alcohol abuse Father     Diabetes Sister     Diabetes Sister     Breast Cancer Maternal Grandmother     Dementia Paternal Grandmother        Allergies  Patient has no known allergies.    Review of Systems  Comprehensive review of systems was negative       Physical Exam  Constitutional:       General: She is not in acute distress.     Appearance: Normal appearance. She is not ill-appearing, toxic-appearing or diaphoretic.   Cardiovascular:      Rate and Rhythm: Normal rate and regular rhythm.      Pulses: Normal pulses.      Heart sounds: Normal heart sounds.   Pulmonary:      Effort: Pulmonary effort is normal.      Breath  sounds: Normal breath sounds.   Abdominal:      General: Abdomen is flat. Bowel sounds are normal. There is no distension.      Palpations: Abdomen is soft.      Tenderness: There is no abdominal tenderness. There is no guarding or rebound.   Skin:     General: Skin is warm and dry.      Capillary Refill: Capillary refill takes less than 2 seconds.   Neurological:      Mental Status: She is alert.          Vital Signs  Blood Pressure : 113/75   Temperature: 36.8 °C (98.2 °F)   Pulse: 65   Respiration: 16   Pulse Oximetry: 96 %     Vitals reviewed    Labs:                    Radiology:  DX-PORTABLE FLUOROSCOPY < 1 HOUR    (Results Pending)         Assessment/Plan:  Pre-Op Diagnosis Codes:     * Lumbar radiculopathy [M54.16]  Procedure(s):  BILATERAL L5-S1 versus L4-5 transforaminal epidural steroid injection with fluoroscopic guidance.

## 2024-02-13 NOTE — OR SURGEON
Immediate Post OP Note    Pre-Op Diagnosis Codes:     * Lumbar radiculopathy [M54.16]      Post-Op Diagnosis Codes:     * Lumbar radiculopathy [M54.16]      Procedure(s):  BILATERAL L5-S1 transforaminal epidural steroid injection with fluoroscopic guidance.    Note: cervical injection approximately 1 month prior to the lumbar - Wound Class: Clean    Surgeon(s):  Marco Rios M.D.    Anesthesiologist/Type of Anesthesia:  No anesthesia staff entered./Local    Surgical Staff:  Circulator: Kathie Falk R.N.  Scrub Person: Kindra Urbina R.N.  Radiology Technologist: Hussain Mattson    Specimens removed if any:  * No specimens in log *    Estimated Blood Loss: None    Findings: None    Complications: None        2/13/2024 11:00 AM Marco Rios M.D.

## 2024-02-14 ENCOUNTER — TELEPHONE (OUTPATIENT)
Dept: PHYSICAL MEDICINE AND REHAB | Facility: MEDICAL CENTER | Age: 69
End: 2024-02-14
Payer: MEDICARE

## 2024-02-14 NOTE — TELEPHONE ENCOUNTER
Called for Post -SP check up: BILATERAL L5-S1 versus L4-5 transforaminal epidural steroid injection with fluoroscopic guidance     Change in pain:    Concerns?    Confirmed [facility] appt?

## 2024-02-27 DIAGNOSIS — E55.9 VITAMIN D DEFICIENCY: ICD-10-CM

## 2024-02-27 DIAGNOSIS — R79.89 ABNORMAL CBC: ICD-10-CM

## 2024-02-27 DIAGNOSIS — E78.2 MIXED HYPERLIPIDEMIA: ICD-10-CM

## 2024-03-26 ENCOUNTER — APPOINTMENT (OUTPATIENT)
Dept: PHYSICAL MEDICINE AND REHAB | Facility: MEDICAL CENTER | Age: 69
End: 2024-03-26
Payer: MEDICARE

## 2024-03-28 ENCOUNTER — HOSPITAL ENCOUNTER (OUTPATIENT)
Dept: LAB | Facility: MEDICAL CENTER | Age: 69
End: 2024-03-28
Attending: FAMILY MEDICINE
Payer: MEDICARE

## 2024-03-28 DIAGNOSIS — R79.89 ABNORMAL CBC: ICD-10-CM

## 2024-03-28 DIAGNOSIS — E78.2 MIXED HYPERLIPIDEMIA: ICD-10-CM

## 2024-03-28 DIAGNOSIS — E55.9 VITAMIN D DEFICIENCY: ICD-10-CM

## 2024-03-28 LAB
25(OH)D3 SERPL-MCNC: 53 NG/ML (ref 30–100)
ALBUMIN SERPL BCP-MCNC: 4.3 G/DL (ref 3.2–4.9)
ALBUMIN/GLOB SERPL: 1.4 G/DL
ALP SERPL-CCNC: 81 U/L (ref 30–99)
ALT SERPL-CCNC: 18 U/L (ref 2–50)
ANION GAP SERPL CALC-SCNC: 12 MMOL/L (ref 7–16)
AST SERPL-CCNC: 20 U/L (ref 12–45)
BASOPHILS # BLD AUTO: 1 % (ref 0–1.8)
BASOPHILS # BLD: 0.05 K/UL (ref 0–0.12)
BILIRUB SERPL-MCNC: 0.3 MG/DL (ref 0.1–1.5)
BUN SERPL-MCNC: 15 MG/DL (ref 8–22)
CALCIUM ALBUM COR SERPL-MCNC: 9.3 MG/DL (ref 8.5–10.5)
CALCIUM SERPL-MCNC: 9.5 MG/DL (ref 8.5–10.5)
CHLORIDE SERPL-SCNC: 104 MMOL/L (ref 96–112)
CHOLEST SERPL-MCNC: 215 MG/DL (ref 100–199)
CO2 SERPL-SCNC: 25 MMOL/L (ref 20–33)
CREAT SERPL-MCNC: 0.76 MG/DL (ref 0.5–1.4)
EOSINOPHIL # BLD AUTO: 0.16 K/UL (ref 0–0.51)
EOSINOPHIL NFR BLD: 3.1 % (ref 0–6.9)
ERYTHROCYTE [DISTWIDTH] IN BLOOD BY AUTOMATED COUNT: 46.8 FL (ref 35.9–50)
GFR SERPLBLD CREATININE-BSD FMLA CKD-EPI: 85 ML/MIN/1.73 M 2
GLOBULIN SER CALC-MCNC: 3.1 G/DL (ref 1.9–3.5)
GLUCOSE SERPL-MCNC: 95 MG/DL (ref 65–99)
HCT VFR BLD AUTO: 45.6 % (ref 37–47)
HDLC SERPL-MCNC: 72 MG/DL
HGB BLD-MCNC: 14.8 G/DL (ref 12–16)
IMM GRANULOCYTES # BLD AUTO: 0.02 K/UL (ref 0–0.11)
IMM GRANULOCYTES NFR BLD AUTO: 0.4 % (ref 0–0.9)
LDLC SERPL CALC-MCNC: 130 MG/DL
LYMPHOCYTES # BLD AUTO: 1.11 K/UL (ref 1–4.8)
LYMPHOCYTES NFR BLD: 21.4 % (ref 22–41)
MCH RBC QN AUTO: 30 PG (ref 27–33)
MCHC RBC AUTO-ENTMCNC: 32.5 G/DL (ref 32.2–35.5)
MCV RBC AUTO: 92.5 FL (ref 81.4–97.8)
MONOCYTES # BLD AUTO: 0.47 K/UL (ref 0–0.85)
MONOCYTES NFR BLD AUTO: 9.1 % (ref 0–13.4)
NEUTROPHILS # BLD AUTO: 3.38 K/UL (ref 1.82–7.42)
NEUTROPHILS NFR BLD: 65 % (ref 44–72)
NRBC # BLD AUTO: 0 K/UL
NRBC BLD-RTO: 0 /100 WBC (ref 0–0.2)
PLATELET # BLD AUTO: 255 K/UL (ref 164–446)
PMV BLD AUTO: 12.2 FL (ref 9–12.9)
POTASSIUM SERPL-SCNC: 4.1 MMOL/L (ref 3.6–5.5)
PROT SERPL-MCNC: 7.4 G/DL (ref 6–8.2)
RBC # BLD AUTO: 4.93 M/UL (ref 4.2–5.4)
SODIUM SERPL-SCNC: 141 MMOL/L (ref 135–145)
TRIGL SERPL-MCNC: 63 MG/DL (ref 0–149)
WBC # BLD AUTO: 5.2 K/UL (ref 4.8–10.8)

## 2024-03-28 PROCEDURE — 85025 COMPLETE CBC W/AUTO DIFF WBC: CPT

## 2024-03-28 PROCEDURE — 82306 VITAMIN D 25 HYDROXY: CPT

## 2024-03-28 PROCEDURE — 80061 LIPID PANEL: CPT

## 2024-03-28 PROCEDURE — 80053 COMPREHEN METABOLIC PANEL: CPT

## 2024-03-28 PROCEDURE — 36415 COLL VENOUS BLD VENIPUNCTURE: CPT

## 2024-04-08 ENCOUNTER — OFFICE VISIT (OUTPATIENT)
Dept: MEDICAL GROUP | Facility: PHYSICIAN GROUP | Age: 69
End: 2024-04-08
Payer: MEDICARE

## 2024-04-08 VITALS
RESPIRATION RATE: 16 BRPM | HEIGHT: 63 IN | WEIGHT: 161.2 LBS | TEMPERATURE: 98.2 F | OXYGEN SATURATION: 99 % | DIASTOLIC BLOOD PRESSURE: 62 MMHG | HEART RATE: 80 BPM | SYSTOLIC BLOOD PRESSURE: 118 MMHG | BODY MASS INDEX: 28.56 KG/M2

## 2024-04-08 DIAGNOSIS — E78.2 MIXED HYPERLIPIDEMIA: ICD-10-CM

## 2024-04-08 DIAGNOSIS — M54.50 CHRONIC LOW BACK PAIN, UNSPECIFIED BACK PAIN LATERALITY, UNSPECIFIED WHETHER SCIATICA PRESENT: ICD-10-CM

## 2024-04-08 DIAGNOSIS — G89.29 CHRONIC LOW BACK PAIN, UNSPECIFIED BACK PAIN LATERALITY, UNSPECIFIED WHETHER SCIATICA PRESENT: ICD-10-CM

## 2024-04-08 PROCEDURE — 3078F DIAST BP <80 MM HG: CPT | Performed by: FAMILY MEDICINE

## 2024-04-08 PROCEDURE — 3074F SYST BP LT 130 MM HG: CPT | Performed by: FAMILY MEDICINE

## 2024-04-08 PROCEDURE — 99214 OFFICE O/P EST MOD 30 MIN: CPT | Performed by: FAMILY MEDICINE

## 2024-04-08 RX ORDER — TRAMADOL HYDROCHLORIDE 50 MG/1
TABLET ORAL
Qty: 30 TABLET | Refills: 0 | Status: SHIPPED | OUTPATIENT
Start: 2024-04-08 | End: 2024-06-08

## 2024-04-08 ASSESSMENT — FIBROSIS 4 INDEX: FIB4 SCORE: 1.28

## 2024-04-08 NOTE — PROGRESS NOTES
Subjective:     CC:   Chief Complaint   Patient presents with    Follow-Up       HPI:   Shawn presents today for follow-up of her labs.  Patient feels she is doing well except she is having low back pain.  Patient is seeing Dr. Rios for back injections she also has problems with her neck.  After discussion with patient the plan is to try to do another low back injection but she has to wait since he is taking care of her neck issues also.  Patient is requesting tramadol to help her only at bedtime.  I encourage patient to let Dr. Rios know that I have given her a prescription for tramadol patient did sign a controlled substance agreement with me.  Patient states she will use it very sparingly and she thinks 30 tablets will last her 2 to 3 months.  I informed patient if she wants refills I do need to see her back for follow-up.    Past Medical History:   Diagnosis Date    Chronic low back pain     Hypertension     MVP (mitral valve prolapse)     Pericarditis        Social History     Tobacco Use    Smoking status: Former     Current packs/day: 0.00     Types: Cigarettes     Start date: 1980     Quit date: 1988     Years since quittin.8    Smokeless tobacco: Never   Vaping Use    Vaping Use: Never used   Substance Use Topics    Alcohol use: Yes     Alcohol/week: 0.0 oz     Comment: rare    Drug use: No       Current Outpatient Medications Ordered in Epic   Medication Sig Dispense Refill    traMADol (ULTRAM) 50 MG Tab Patient to take 1 tablet po prior to bed as needed for severe low back pain for 90 days 30 Tablet 0    cyclobenzaprine (FLEXERIL) 10 mg Tab Take 1 Tablet by mouth 3 times a day as needed for Moderate Pain or Muscle Spasms. 90 Tablet 0    methylPREDNISolone (MEDROL DOSEPAK) 4 MG Tablet Therapy Pack Follow schedule on package instructions. 21 Tablet 0    meloxicam (MOBIC) 15 MG tablet TAKE 1 TABLET BY MOUTH EVERY DAY WITH FOOD. NO ADVIL/ALEVE/MOTRIN/IBUPROFEN ON THE SAME DAY 90 Tablet  "1    omeprazole (PRILOSEC) 40 MG delayed-release capsule Take 1 Capsule by mouth every day. 90 Capsule 1    simvastatin (ZOCOR) 20 MG Tab TAKE 1 TABLET BY MOUTH EVERY EVENING 90 Tablet 1    tretinoin (RETIN-A) 0.025 % cream APPLY PEA SIZED AMOUNT TOPICALLY TO FACE EVERY 3 NIGHTS. INCREASE TO EVERY NIGHT AS IRRITATION ALLOWS      estradiol (ESTRACE) 1 MG Tab TAKE 1 TABLET BY MOUTH EVERY DAY 90 Tablet 3     No current Epic-ordered facility-administered medications on file.       Allergies:  Patient has no known allergies.    Health Maintenance: Completed    ROS:  Gen: no fevers/chills, no changes in weight  Eyes: no changes in vision  ENT: no sore throat, no hearing loss, no bloody nose  Pulm: no sob, no cough  CV: no chest pain, no palpitations  GI: no nausea/vomiting, no diarrhea  : no dysuria  Neuro: no headaches, no numbness/tingling  Heme/Lymph: no easy bruising    Objective:     Exam:  /62 (BP Location: Left arm, Patient Position: Sitting, BP Cuff Size: Adult)   Pulse 80   Temp 36.8 °C (98.2 °F) (Temporal)   Resp 16   Ht 1.6 m (5' 3\")   Wt 73.1 kg (161 lb 3.2 oz)   SpO2 99%   BMI 28.56 kg/m²  Body mass index is 28.56 kg/m².    Gen: Alert and oriented, No apparent distress.  Skin: Warm and dry.  No obvious lesions.  Eyes: Sclera wnl Pupils normal in size  Lungs: Normal effort, CTA bilaterally, no wheezes, rhonchi, or rales  CV: Regular rate and rhythm. No murmurs, rubs, or gallops.  Musculoskeletal: Normal gait. No extremity cyanosis, clubbing, or edema.  Neuro: Oriented to person, place and time  Psych: Mood is wnl         Assessment & Plan:     68 y.o. female with the following -     1. Chronic low back pain, unspecified back pain laterality, unspecified whether sciatica present  I discussed with patient the need to follow her up carefully.  Patient expressed understanding of this patient denies any history of the addiction problems.  Patient states that she has had tramadol in the past and had " no complications.  - Controlled Substance Treatment Agreement  - traMADol (ULTRAM) 50 MG Tab; Patient to take 1 tablet po prior to bed as needed for severe low back pain for 90 days  Dispense: 30 Tablet; Refill: 0    2. Mixed hyperlipidemia  Patient would like to work on her diet recommend repeat this again in 3 months patient very agreeable with the plan  - Comp Metabolic Panel; Future  - Lipid Profile; Future       Return in about 3 months (around 7/8/2024), or if symptoms worsen or fail to improve.    Please note that this dictation was created using voice recognition software. I have made every reasonable attempt to correct obvious errors, but I expect that there are errors of grammar and possibly content that I did not discover before finalizing the note.

## 2024-04-09 DIAGNOSIS — E78.2 MIXED HYPERLIPIDEMIA: ICD-10-CM

## 2024-04-10 RX ORDER — SIMVASTATIN 20 MG
20 TABLET ORAL EVERY EVENING
Qty: 90 TABLET | Refills: 1 | Status: SHIPPED | OUTPATIENT
Start: 2024-04-10

## 2024-04-15 ENCOUNTER — OFFICE VISIT (OUTPATIENT)
Dept: PHYSICAL MEDICINE AND REHAB | Facility: MEDICAL CENTER | Age: 69
End: 2024-04-15
Payer: MEDICARE

## 2024-04-15 ENCOUNTER — HOSPITAL ENCOUNTER (OUTPATIENT)
Dept: RADIOLOGY | Facility: MEDICAL CENTER | Age: 69
End: 2024-04-15
Attending: PHYSICAL MEDICINE & REHABILITATION
Payer: MEDICARE

## 2024-04-15 VITALS
DIASTOLIC BLOOD PRESSURE: 80 MMHG | SYSTOLIC BLOOD PRESSURE: 124 MMHG | OXYGEN SATURATION: 99 % | HEART RATE: 67 BPM | HEIGHT: 63 IN | WEIGHT: 162 LBS | TEMPERATURE: 97.2 F | BODY MASS INDEX: 28.7 KG/M2

## 2024-04-15 DIAGNOSIS — G56.01 CARPAL TUNNEL SYNDROME, RIGHT: ICD-10-CM

## 2024-04-15 DIAGNOSIS — M48.02 CERVICAL SPINAL STENOSIS: ICD-10-CM

## 2024-04-15 DIAGNOSIS — M47.816 LUMBAR SPONDYLOSIS: ICD-10-CM

## 2024-04-15 DIAGNOSIS — M25.551 CHRONIC RIGHT HIP PAIN: ICD-10-CM

## 2024-04-15 DIAGNOSIS — M53.3 SACROILIAC JOINT DYSFUNCTION OF RIGHT SIDE: ICD-10-CM

## 2024-04-15 DIAGNOSIS — G89.29 CHRONIC RIGHT HIP PAIN: ICD-10-CM

## 2024-04-15 DIAGNOSIS — M47.812 CERVICAL SPONDYLOSIS: ICD-10-CM

## 2024-04-15 DIAGNOSIS — M54.12 CERVICAL RADICULOPATHY: ICD-10-CM

## 2024-04-15 DIAGNOSIS — M54.16 LUMBAR RADICULOPATHY: ICD-10-CM

## 2024-04-15 PROCEDURE — 76942 ECHO GUIDE FOR BIOPSY: CPT | Performed by: PHYSICAL MEDICINE & REHABILITATION

## 2024-04-15 PROCEDURE — 73502 X-RAY EXAM HIP UNI 2-3 VIEWS: CPT | Mod: RT

## 2024-04-15 PROCEDURE — 3079F DIAST BP 80-89 MM HG: CPT | Performed by: PHYSICAL MEDICINE & REHABILITATION

## 2024-04-15 PROCEDURE — 99214 OFFICE O/P EST MOD 30 MIN: CPT | Mod: 25 | Performed by: PHYSICAL MEDICINE & REHABILITATION

## 2024-04-15 PROCEDURE — 20526 THER INJECTION CARP TUNNEL: CPT | Mod: RT | Performed by: PHYSICAL MEDICINE & REHABILITATION

## 2024-04-15 PROCEDURE — 3074F SYST BP LT 130 MM HG: CPT | Performed by: PHYSICAL MEDICINE & REHABILITATION

## 2024-04-15 PROCEDURE — 1125F AMNT PAIN NOTED PAIN PRSNT: CPT | Performed by: PHYSICAL MEDICINE & REHABILITATION

## 2024-04-15 RX ORDER — DEXAMETHASONE SODIUM PHOSPHATE 4 MG/ML
4 INJECTION, SOLUTION INTRA-ARTICULAR; INTRALESIONAL; INTRAMUSCULAR; INTRAVENOUS; SOFT TISSUE ONCE
Status: COMPLETED | OUTPATIENT
Start: 2024-04-15 | End: 2024-04-15

## 2024-04-15 RX ADMIN — DEXAMETHASONE SODIUM PHOSPHATE 4 MG: 4 INJECTION, SOLUTION INTRA-ARTICULAR; INTRALESIONAL; INTRAMUSCULAR; INTRAVENOUS; SOFT TISSUE at 11:14

## 2024-04-15 ASSESSMENT — PATIENT HEALTH QUESTIONNAIRE - PHQ9: CLINICAL INTERPRETATION OF PHQ2 SCORE: 0

## 2024-04-15 ASSESSMENT — FIBROSIS 4 INDEX: FIB4 SCORE: 1.28

## 2024-04-15 ASSESSMENT — PAIN SCALES - GENERAL: PAINLEVEL: 6=MODERATE PAIN

## 2024-04-15 NOTE — PROGRESS NOTES
Follow up patient note  Interventional spine and sports physiatry, Physical medicine rehabilitation      Chief complaint:   Chief Complaint   Patient presents with    Follow-Up     Neck pain          HISTORY    Please see new patient note by Dr Rios,  for more details.     HPI  Patient identification: Shawn Westbrook  1955,   female  With Diagnoses of Carpal tunnel syndrome, bilateral, Sacroiliac joint dysfunction of right side, Chronic right hip pain, Cervical radiculopathy, Cervical spinal stenosis, Cervical spondylosis, Lumbar radiculopathy, and Lumbar spondylosis were pertinent to this visit.     Procedures:  2020 C7-T1 interlaminar epidural steroid injection with 100% improvement in pain and function in regards to the neck and arm pain after the injection follow-up visit on 2020 C7-T1 cervical interlaminar epidural steroid injection initially with 60% improvement but this only lasted for a few weeks  11/10/2022 right long digit trigger finger injection 100% relief of triggering  11/10/2022 right carpal tunnel injection 100% pain relief  1/10/2023 C7-T1 cervical interlaminar epidural steroid injection 100% pain relief  2024 C7-T1 left cervical interlaminar epidural steroid injection 90% pain relief postprocedure  2024 bilateral L5-S1 transforaminal epidural steroid injection 80% improvement in radiating pain.    The patient had excellent pain relief after the cervical epidural steroid injection with preprocedure pain 8 out of 10 in intensity NRS postprocedure pain 1 out of 10 intensity NRS.  She no longer has pain rating down the arms.  She has improved sleep in bed mobility and ADLs in regards to her neck pain.    In regards to the low back pain she did have improvement in the radiating pain especially on the left side.  Overall 80% improvement in radiating pain in the back pain after the epidural steroid injection.    She does have a residual pain around the right  sacroiliac joint and the right hip worse with hip movements worse with getting in and out of a car, bed mobility, aching in quality, chronic.  This is 6 out of 10 in intensity constant.  Worse at night.    She has also had a recurrence of her carpal tunnel syndrome bilaterally right worse than left.  She has been doing her stretching exercises and doing her neutral wrist splints at night.  This pain will wake her up especially on the right side.  Flick sign is positive.  Pain is in the first 3 digits and palmar aspect of the bilateral hands right worse than left.  She responded well to previous carpal tunnel injection.      The patient has done a provider driven home exercise program including the past 6 months.     Medications tried include gabapentin which had to be stopped because of side effects.  Patient is on Lyrica now. She is also use meloxicam and Flexeril.           ROS Red Flags :   Fever, Chills, Sweats: Denies  Involuntary Weight Loss: Denies  Bowel/Bladder Incontinence: Denies  Saddle Anesthesia: Denies        PMHx:   Past Medical History:   Diagnosis Date    Chronic low back pain     Hypertension     MVP (mitral valve prolapse)     Pericarditis 2010       PSHx:   Past Surgical History:   Procedure Laterality Date    ME NJX AA&/STRD TFRML EPI LUMBAR/SACRAL 1 LEVEL Bilateral 2/13/2024    Procedure: BILATERAL L5-S1 versus L4-5 transforaminal epidural steroid injection with fluoroscopic guidance.    Note: cervical injection approximately 1 month prior to the lumbar;  Surgeon: Marco Rios M.D.;  [facility];  Service: Pain Management    ME INJ CERV/THORAC,W/ IMAGING Left 1/16/2024    Procedure: LEFT cervical C7-T1 interlaminar epidural steroid injection.    Note: cervical approximately 1 month prior to the lumbar;  Surgeon: Marco Rios M.D.;  [facility];  Service: Pain Management    ME INJ CERV/THORAC,W/ IMAGING N/A 1/10/2023    Procedure:  C7-T1 interlaminar epidural steroid injection;  Surgeon: Marco Rios M.D.;  [facility];  Service: Pain Management    SC INJ CERV/THORAC,W/ IMAGING N/A 9/27/2022    Procedure: C7-T1 interlaminar epidural steroid injection;  Surgeon: Marco Rios M.D.;  [facility];  Service: Pain Management    ABDOMINAL HYSTERECTOMY TOTAL      cervix remains and one ovary.     ARTHROPLASTY Bilateral     Total knee replacements    LAMINOTOMY      L4-5, L5-S1 fusion    OTHER ORTHOPEDIC SURGERY      spinal fusion    OTHER ORTHOPEDIC SURGERY      right knee replacement       Family history   Family History   Problem Relation Age of Onset    Heart Disease Mother     Heart Disease Father     Cancer Father     Alcohol abuse Father     Diabetes Sister     Diabetes Sister     Breast Cancer Maternal Grandmother     Dementia Paternal Grandmother          Medications:   Outpatient Medications Marked as Taking for the 4/15/24 encounter (Office Visit) with Marco Rios M.D.   Medication Sig Dispense Refill    simvastatin (ZOCOR) 20 MG Tab TAKE 1 TABLET BY MOUTH EVERY EVENING 90 Tablet 1    traMADol (ULTRAM) 50 MG Tab Patient to take 1 tablet po prior to bed as needed for severe low back pain for 90 days 30 Tablet 0    cyclobenzaprine (FLEXERIL) 10 mg Tab Take 1 Tablet by mouth 3 times a day as needed for Moderate Pain or Muscle Spasms. 90 Tablet 0    methylPREDNISolone (MEDROL DOSEPAK) 4 MG Tablet Therapy Pack Follow schedule on package instructions. 21 Tablet 0    meloxicam (MOBIC) 15 MG tablet TAKE 1 TABLET BY MOUTH EVERY DAY WITH FOOD. NO ADVIL/ALEVE/MOTRIN/IBUPROFEN ON THE SAME DAY 90 Tablet 1    omeprazole (PRILOSEC) 40 MG delayed-release capsule Take 1 Capsule by mouth every day. 90 Capsule 1    tretinoin (RETIN-A) 0.025 % cream APPLY PEA SIZED AMOUNT TOPICALLY TO FACE EVERY 3 NIGHTS. INCREASE TO EVERY NIGHT AS IRRITATION ALLOWS      estradiol (ESTRACE) 1 MG Tab TAKE 1  TABLET BY MOUTH EVERY DAY 90 Tablet 3     Current Facility-Administered Medications for the 4/15/24 encounter (Office Visit) with Marco Rios M.D.   Medication Dose Route Frequency Provider Last Rate Last Admin    dexamethasone (Decadron) injection 4 mg  4 mg Injection Once Marco Rios M.D.            Current Outpatient Medications on File Prior to Visit   Medication Sig Dispense Refill    simvastatin (ZOCOR) 20 MG Tab TAKE 1 TABLET BY MOUTH EVERY EVENING 90 Tablet 1    traMADol (ULTRAM) 50 MG Tab Patient to take 1 tablet po prior to bed as needed for severe low back pain for 90 days 30 Tablet 0    cyclobenzaprine (FLEXERIL) 10 mg Tab Take 1 Tablet by mouth 3 times a day as needed for Moderate Pain or Muscle Spasms. 90 Tablet 0    methylPREDNISolone (MEDROL DOSEPAK) 4 MG Tablet Therapy Pack Follow schedule on package instructions. 21 Tablet 0    meloxicam (MOBIC) 15 MG tablet TAKE 1 TABLET BY MOUTH EVERY DAY WITH FOOD. NO ADVIL/ALEVE/MOTRIN/IBUPROFEN ON THE SAME DAY 90 Tablet 1    omeprazole (PRILOSEC) 40 MG delayed-release capsule Take 1 Capsule by mouth every day. 90 Capsule 1    tretinoin (RETIN-A) 0.025 % cream APPLY PEA SIZED AMOUNT TOPICALLY TO FACE EVERY 3 NIGHTS. INCREASE TO EVERY NIGHT AS IRRITATION ALLOWS      estradiol (ESTRACE) 1 MG Tab TAKE 1 TABLET BY MOUTH EVERY DAY 90 Tablet 3     No current facility-administered medications on file prior to visit.         Allergies:   No Known Allergies      Social Hx:   Social History     Socioeconomic History    Marital status:      Spouse name: Not on file    Number of children: Not on file    Years of education: Not on file    Highest education level: Not on file   Occupational History     Employer: OTHER     Comment: retired   Tobacco Use    Smoking status: Former     Current packs/day: 0.00     Types: Cigarettes     Start date: 1980     Quit date: 1988     Years since quittin.9    Smokeless tobacco: Never   Vaping Use    Vaping  "Use: Never used   Substance and Sexual Activity    Alcohol use: Yes     Alcohol/week: 0.0 oz     Comment: rare    Drug use: No    Sexual activity: Not Currently     Comment:    Other Topics Concern     Service No    Blood Transfusions No    Caffeine Concern No    Occupational Exposure No    Hobby Hazards No    Sleep Concern Yes     Comment: due to pain    Stress Concern Yes    Weight Concern No    Special Diet No    Back Care No    Exercise Yes    Bike Helmet No    Seat Belt Yes    Self-Exams Yes   Social History Narrative    Not on file     Social Determinants of Health     Financial Resource Strain: Not on file   Food Insecurity: Not on file   Transportation Needs: Not on file   Physical Activity: Not on file   Stress: Not on file   Social Connections: Not on file   Intimate Partner Violence: Not on file   Housing Stability: Not on file         EXAMINATION     Physical Exam:   Vitals: /80 (BP Location: Right arm, Patient Position: Sitting, BP Cuff Size: Adult)   Pulse 67   Temp 36.2 °C (97.2 °F) (Temporal)   Ht 1.6 m (5' 3\")   Wt 73.5 kg (162 lb)   SpO2 99%     Constitutional:   Body Habitus: Body mass index is 28.7 kg/m².  Cooperation: Fully cooperates with exam  Appearance: Well-groomed no disheveled    Respiratory-  breathing comfortable on room air, no audible wheezing  Cardiovascular- capillary refills less than 2 seconds. No lower extremity edema is noted.   Psychiatric- alert and oriented ×3. Normal affect.    MSK and Neuro: -    Strength is 5 out of 5 throughout the bilateral upper and bilateral lower extremities.  Sensations intact.    Sacroiliac joint maneuvers  Thigh thrust positive right, negative left    Jong's positive right, negative left    Gaenslen's positive right, negative left    SI joint distraction positive right, negative left    SI joint compression positive right, negative left    Tenderness to palpation of sacroiliac joint: positive right, negative left  " "    Bilateral hands:   Inspection: No swelling,  Deformities or rashes. Symmetric appearing thenar and hyperthenar regions bilaterally.  Palpation no significant tenderness to palpation throughout the bilateral hands  Range of motion is within normal limits throughout bilateral hands, fingers and wrist.  Special tests:  Tinel's at the wrist over the median nerve positive bilaterally  Carpal tunnel compression: positive bilaterally  Phalen's test: positive bilaterally  Finkelstein's test: negative bilaterally  CMC grind test negative bilaterally      MEDICAL DECISION MAKING    DATA    Labs:   Lab Results   Component Value Date/Time    SODIUM 141 03/28/2024 10:14 AM    POTASSIUM 4.1 03/28/2024 10:14 AM    CHLORIDE 104 03/28/2024 10:14 AM    CO2 25 03/28/2024 10:14 AM    GLUCOSE 95 03/28/2024 10:14 AM    BUN 15 03/28/2024 10:14 AM    CREATININE 0.76 03/28/2024 10:14 AM        No results found for: \"PROTHROMBTM\", \"INR\"     Lab Results   Component Value Date/Time    WBC 5.2 03/28/2024 10:14 AM    RBC 4.93 03/28/2024 10:14 AM    HEMOGLOBIN 14.8 03/28/2024 10:14 AM    HEMATOCRIT 45.6 03/28/2024 10:14 AM    MCV 92.5 03/28/2024 10:14 AM    MCH 30.0 03/28/2024 10:14 AM    MCHC 32.5 03/28/2024 10:14 AM    MPV 12.2 03/28/2024 10:14 AM    NEUTSPOLYS 65.00 03/28/2024 10:14 AM    LYMPHOCYTES 21.40 (L) 03/28/2024 10:14 AM    MONOCYTES 9.10 03/28/2024 10:14 AM    EOSINOPHILS 3.10 03/28/2024 10:14 AM    BASOPHILS 1.00 03/28/2024 10:14 AM        Lab Results   Component Value Date/Time    HBA1C 5.6 06/25/2014 11:27 AM          Imaging:   I personally reviewed following images    MRI cervical spine 6/ 17/2020  There is mild to moderate central canal stenosis at C5-6 with severe left and moderate right neuroforaminal stenosis.  There is mild central canal stenosis at C6-7 with moderate bilateral neuroforaminal stenosis.  There is facet arthropathy right at C2-3.  Also facet arthropathy right worse than left at C3-4, C4-5, C5-6.    I " reviewed the following radiology reports                                 MRI lumbar spine 5/3/2022        Results for orders placed during the hospital encounter of 20   MR-CERVICAL SPINE-W/O    Impression 1.  Mild spinal canal narrowing at C5-6 and C6-7    2.  Foraminal stenoses at C5-6 and C6-7    3.  Multilevel disc bulge, endplate spurring, and facet arthropathy                     Results for orders placed during the hospital encounter of 20   DX-CERVICAL SPINE-WITH FLEX-EXT   7+    Impression 1.  No compression deformity or acute fracture.    2.  There is degenerative disc disease and facet arthropathy with bilateral osseous neural foraminal narrowing.    3.  No focal instability is noted on flexion extension views.                            ELECTRODIAGNOSTICS  EMG/nerve conduction study performed on  22  This is an abnormal study.  There is electrophysiologic evidence of mild right median neuropathy at the wrist (carpal tunnel syndrome).  There is no evidence of right cervical (C5-T1) radiculopathy.  Clinical correlation is recommended.                                                               DIAGNOSIS   Visit Diagnoses     ICD-10-CM   1. Carpal tunnel syndrome, bilateral  G56.01   2. Sacroiliac joint dysfunction of right side  M53.3   3. Chronic right hip pain  M25.551    G89.29   4. Cervical radiculopathy  M54.12   5. Cervical spinal stenosis  M48.02   6. Cervical spondylosis  M47.812   7. Lumbar radiculopathy  M54.16   8. Lumbar spondylosis  M47.816         ASSESSMENT and PLAN:     Shawn Westbrook  1955,   female      Shawn was seen today for follow-up.    Diagnoses and all orders for this visit:    Carpal tunnel syndrome, bilateral  -     Consent for all Surgical, Special Diagnostic or Therapeutic Procedures  -     dexamethasone (Decadron) injection 4 mg    Sacroiliac joint dysfunction of right side  -     Referral to Physical Medicine Rehab    Chronic right hip pain  -      DX-HIP-COMPLETE - UNILATERAL 2+ RIGHT; Future    Cervical radiculopathy    Cervical spinal stenosis    Cervical spondylosis    Lumbar radiculopathy    Lumbar spondylosis      In regards to the carpal tunnel syndrome is causing the majority the patient symptoms at this time we decided to proceed with a right carpal tunnel injection.  She responded well to previous injection.  The patient declined surgical referral.  We discussed additions to home exercise program and to continue neutral wrist splint nightly.    In regards to the back pain the pain secondary to lumbar radiculopathy is improved.  Cervical radiculopathy improved.    The right low back and hip pain is likely secondary to a combination of hip arthritis as well as sacroiliac joint dysfunction.  We discussed options for this.  Will proceed with a right sacroiliac joint injection for diagnostic and therapeutic purposes with fluoroscopic guidance.    The risks benefits and alternatives to this procedure were discussed and the patient wishes to proceed with the procedure. Risks include but are not limited to damage to surrounding structures, infection, bleeding, worsening of pain which can be permanent, weakness which can be permanent. Benefits include pain relief, improved function. Alternatives includes not doing the procedure.      Medications: Continue Lyrica as prescribed by PCP Princess Mena M.D.       Follow up: After the above procedures    Thank you for allowing me to participate in the care of this patient. If you have any questions please not hesitate to contact me.             Please note that this dictation was created using voice recognition software. I have made every reasonable attempt to correct obvious errors but there may be errors of grammar and content that I may have overlooked prior to finalization of this note.      Marco Rios MD  Interventional Spine and Sports Physiatry  Physical Medicine and Rehabilitation  [facility]  [facility]

## 2024-04-15 NOTE — PROCEDURES
Date of Service: 4/15/2024    Patient: Shawn Westbrook 68 y.o. female MRN: [id number]     Physician/s: Marco Rios MD    Pre-operative Diagnosis: RIGHT  carpal tunnel syndrome    Post-operative Diagnosis: RIGHT  carpal tunnel syndrome      Procedure: RIGHT  ultrasound-guided carpal tunnel injection    Description of procedure:    The risks, benefits, and alternatives of the procedure were reviewed and discussed with the patient.  Written informed consent was freely obtained. A pre-procedural time-out was conducted by the physician verifying patient’s identity, procedure to be performed, procedure site and side, and allergy verification. Appropriate equipment was determined to be in place for the procedure.     No sedation was used for this procedure.     A solution was prepared with 1 mL of 4 mg/mL of dexamethasone and 2 mL of 1% lidocaine.      In the office suite the patient was placed in a sitting position, and her RIGHT   hand/s were rested with the palm/s up on a sterile rodriguez stand, and the skin was prepped and draped in the usual sterile fashion. Median nerve at the carpal tunnel was identified under ultrasound guidance. Also identified where the ulnar nerve, ulnar artery, radial artery to confirm the needle path would not be impacting the structures. Under ultrasound guidance with an in plane approach,  A 27g 1.5 inch needle was placed into skin and advanced adjacent so the needle path was deep to the median nerve. Following negative aspiration, a total of 1.5mL solution mixture was injected perineurally. The patient's skin was wiped with a 4x4 gauze, the area was cleansed with alcohol prep, and a bandaid was applied. There were no complications noted.     The images were uploaded to our secure system for permanent storage.         Marco Rios MD  Interventional Spine and Pain  Physical Medicine and Rehabilitation  [facility]

## 2024-04-15 NOTE — H&P (VIEW-ONLY)
Follow up patient note  Interventional spine and sports physiatry, Physical medicine rehabilitation      Chief complaint:   Chief Complaint   Patient presents with    Follow-Up     Neck pain          HISTORY    Please see new patient note by Dr Rios,  for more details.     HPI  Patient identification: Shawn Westbrook  1955,   female  With Diagnoses of Carpal tunnel syndrome, bilateral, Sacroiliac joint dysfunction of right side, Chronic right hip pain, Cervical radiculopathy, Cervical spinal stenosis, Cervical spondylosis, Lumbar radiculopathy, and Lumbar spondylosis were pertinent to this visit.     Procedures:  2020 C7-T1 interlaminar epidural steroid injection with 100% improvement in pain and function in regards to the neck and arm pain after the injection follow-up visit on 2020 C7-T1 cervical interlaminar epidural steroid injection initially with 60% improvement but this only lasted for a few weeks  11/10/2022 right long digit trigger finger injection 100% relief of triggering  11/10/2022 right carpal tunnel injection 100% pain relief  1/10/2023 C7-T1 cervical interlaminar epidural steroid injection 100% pain relief  2024 C7-T1 left cervical interlaminar epidural steroid injection 90% pain relief postprocedure  2024 bilateral L5-S1 transforaminal epidural steroid injection 80% improvement in radiating pain.    The patient had excellent pain relief after the cervical epidural steroid injection with preprocedure pain 8 out of 10 in intensity NRS postprocedure pain 1 out of 10 intensity NRS.  She no longer has pain rating down the arms.  She has improved sleep in bed mobility and ADLs in regards to her neck pain.    In regards to the low back pain she did have improvement in the radiating pain especially on the left side.  Overall 80% improvement in radiating pain in the back pain after the epidural steroid injection.    She does have a residual pain around the right  sacroiliac joint and the right hip worse with hip movements worse with getting in and out of a car, bed mobility, aching in quality, chronic.  This is 6 out of 10 in intensity constant.  Worse at night.    She has also had a recurrence of her carpal tunnel syndrome bilaterally right worse than left.  She has been doing her stretching exercises and doing her neutral wrist splints at night.  This pain will wake her up especially on the right side.  Flick sign is positive.  Pain is in the first 3 digits and palmar aspect of the bilateral hands right worse than left.  She responded well to previous carpal tunnel injection.      The patient has done a provider driven home exercise program including the past 6 months.     Medications tried include gabapentin which had to be stopped because of side effects.  Patient is on Lyrica now. She is also use meloxicam and Flexeril.           ROS Red Flags :   Fever, Chills, Sweats: Denies  Involuntary Weight Loss: Denies  Bowel/Bladder Incontinence: Denies  Saddle Anesthesia: Denies        PMHx:   Past Medical History:   Diagnosis Date    Chronic low back pain     Hypertension     MVP (mitral valve prolapse)     Pericarditis 2010       PSHx:   Past Surgical History:   Procedure Laterality Date    [place] NJX AA&/STRD TFRML EPI LUMBAR/SACRAL 1 LEVEL Bilateral 2/13/2024    Procedure: BILATERAL L5-S1 versus L4-5 transforaminal epidural steroid injection with fluoroscopic guidance.    Note: cervical injection approximately 1 month prior to the lumbar;  Surgeon: Marco Rios M.D.;  [facility];  Service: Pain Management    [place] INJ CERV/THORAC,W/ IMAGING Left 1/16/2024    Procedure: LEFT cervical C7-T1 interlaminar epidural steroid injection.    Note: cervical approximately 1 month prior to the lumbar;  Surgeon: Marco Rios M.D.;  [facility];  Service: Pain Management    [place] INJ CERV/THORAC,W/ IMAGING N/A 1/10/2023    Procedure:  C7-T1 interlaminar epidural steroid injection;  Surgeon: Marco Rios M.D.;  [facility];  Service: Pain Management    [place] INJ CERV/THORAC,W/ IMAGING N/A 9/27/2022    Procedure: C7-T1 interlaminar epidural steroid injection;  Surgeon: Marco Rios M.D.;  [facility];  Service: Pain Management    ABDOMINAL HYSTERECTOMY TOTAL      cervix remains and one ovary.     ARTHROPLASTY Bilateral     Total knee replacements    LAMINOTOMY      L4-5, L5-S1 fusion    OTHER ORTHOPEDIC SURGERY      spinal fusion    OTHER ORTHOPEDIC SURGERY      right knee replacement       Family history   Family History   Problem Relation Age of Onset    Heart Disease Mother     Heart Disease Father     Cancer Father     Alcohol abuse Father     Diabetes Sister     Diabetes Sister     Breast Cancer Maternal Grandmother     Dementia Paternal Grandmother          Medications:   Outpatient Medications Marked as Taking for the 4/15/24 encounter (Office Visit) with Marco Rios M.D.   Medication Sig Dispense Refill    simvastatin (ZOCOR) 20 MG Tab TAKE 1 TABLET BY MOUTH EVERY EVENING 90 Tablet 1    traMADol (ULTRAM) 50 MG Tab Patient to take 1 tablet po prior to bed as needed for severe low back pain for 90 days 30 Tablet 0    cyclobenzaprine (FLEXERIL) 10 mg Tab Take 1 Tablet by mouth 3 times a day as needed for Moderate Pain or Muscle Spasms. 90 Tablet 0    methylPREDNISolone (MEDROL DOSEPAK) 4 MG Tablet Therapy Pack Follow schedule on package instructions. 21 Tablet 0    meloxicam (MOBIC) 15 MG tablet TAKE 1 TABLET BY MOUTH EVERY DAY WITH FOOD. NO ADVIL/ALEVE/MOTRIN/IBUPROFEN ON THE SAME DAY 90 Tablet 1    omeprazole (PRILOSEC) 40 MG delayed-release capsule Take 1 Capsule by mouth every day. 90 Capsule 1    tretinoin (RETIN-A) 0.025 % cream APPLY PEA SIZED AMOUNT TOPICALLY TO FACE EVERY 3 NIGHTS. INCREASE TO EVERY NIGHT AS IRRITATION ALLOWS      estradiol (ESTRACE) 1 MG Tab TAKE 1  TABLET BY MOUTH EVERY DAY 90 Tablet 3     Current Facility-Administered Medications for the 4/15/24 encounter (Office Visit) with Marco Rios M.D.   Medication Dose Route Frequency Provider Last Rate Last Admin    dexamethasone (Decadron) injection 4 mg  4 mg Injection Once Marco iRos M.D.            Current Outpatient Medications on File Prior to Visit   Medication Sig Dispense Refill    simvastatin (ZOCOR) 20 MG Tab TAKE 1 TABLET BY MOUTH EVERY EVENING 90 Tablet 1    traMADol (ULTRAM) 50 MG Tab Patient to take 1 tablet po prior to bed as needed for severe low back pain for 90 days 30 Tablet 0    cyclobenzaprine (FLEXERIL) 10 mg Tab Take 1 Tablet by mouth 3 times a day as needed for Moderate Pain or Muscle Spasms. 90 Tablet 0    methylPREDNISolone (MEDROL DOSEPAK) 4 MG Tablet Therapy Pack Follow schedule on package instructions. 21 Tablet 0    meloxicam (MOBIC) 15 MG tablet TAKE 1 TABLET BY MOUTH EVERY DAY WITH FOOD. NO ADVIL/ALEVE/MOTRIN/IBUPROFEN ON THE SAME DAY 90 Tablet 1    omeprazole (PRILOSEC) 40 MG delayed-release capsule Take 1 Capsule by mouth every day. 90 Capsule 1    tretinoin (RETIN-A) 0.025 % cream APPLY PEA SIZED AMOUNT TOPICALLY TO FACE EVERY 3 NIGHTS. INCREASE TO EVERY NIGHT AS IRRITATION ALLOWS      estradiol (ESTRACE) 1 MG Tab TAKE 1 TABLET BY MOUTH EVERY DAY 90 Tablet 3     No current facility-administered medications on file prior to visit.         Allergies:   No Known Allergies      Social Hx:   Social History     Socioeconomic History    Marital status:      Spouse name: Not on file    Number of children: Not on file    Years of education: Not on file    Highest education level: Not on file   Occupational History     Employer: OTHER     Comment: retired   Tobacco Use    Smoking status: Former     Current packs/day: 0.00     Types: Cigarettes     Start date: 1980     Quit date: 1988     Years since quittin.9    Smokeless tobacco: Never   Vaping Use    Vaping  "Use: Never used   Substance and Sexual Activity    Alcohol use: Yes     Alcohol/week: 0.0 oz     Comment: rare    Drug use: No    Sexual activity: Not Currently     Comment:    Other Topics Concern     Service No    Blood Transfusions No    Caffeine Concern No    Occupational Exposure No    Hobby Hazards No    Sleep Concern Yes     Comment: due to pain    Stress Concern Yes    Weight Concern No    Special Diet No    Back Care No    Exercise Yes    Bike Helmet No    Seat Belt Yes    Self-Exams Yes   Social History Narrative    Not on file     Social Determinants of Health     Financial Resource Strain: Not on file   Food Insecurity: Not on file   Transportation Needs: Not on file   Physical Activity: Not on file   Stress: Not on file   Social Connections: Not on file   Intimate Partner Violence: Not on file   Housing Stability: Not on file         EXAMINATION     Physical Exam:   Vitals: /80 (BP Location: Right arm, Patient Position: Sitting, BP Cuff Size: Adult)   Pulse 67   Temp 36.2 °C (97.2 °F) (Temporal)   Ht 1.6 m (5' 3\")   Wt 73.5 kg (162 lb)   SpO2 99%     Constitutional:   Body Habitus: Body mass index is 28.7 kg/m².  Cooperation: Fully cooperates with exam  Appearance: Well-groomed no disheveled    Respiratory-  breathing comfortable on room air, no audible wheezing  Cardiovascular- capillary refills less than 2 seconds. No lower extremity edema is noted.   Psychiatric- alert and oriented ×3. Normal affect.    MSK and Neuro: -    Strength is 5 out of 5 throughout the bilateral upper and bilateral lower extremities.  Sensations intact.    Sacroiliac joint maneuvers  Thigh thrust positive right, negative left    Jong's positive right, negative left    Gaenslen's positive right, negative left    SI joint distraction positive right, negative left    SI joint compression positive right, negative left    Tenderness to palpation of sacroiliac joint: positive right, negative left  " "    Bilateral hands:   Inspection: No swelling,  Deformities or rashes. Symmetric appearing thenar and hyperthenar regions bilaterally.  Palpation no significant tenderness to palpation throughout the bilateral hands  Range of motion is within normal limits throughout bilateral hands, fingers and wrist.  Special tests:  Tinel's at the wrist over the median nerve positive bilaterally  Carpal tunnel compression: positive bilaterally  Phalen's test: positive bilaterally  Finkelstein's test: negative bilaterally  CMC grind test negative bilaterally      MEDICAL DECISION MAKING    DATA    Labs:   Lab Results   Component Value Date/Time    SODIUM 141 03/28/2024 10:14 AM    POTASSIUM 4.1 03/28/2024 10:14 AM    CHLORIDE 104 03/28/2024 10:14 AM    CO2 25 03/28/2024 10:14 AM    GLUCOSE 95 03/28/2024 10:14 AM    BUN 15 03/28/2024 10:14 AM    CREATININE 0.76 03/28/2024 10:14 AM        No results found for: \"PROTHROMBTM\", \"INR\"     Lab Results   Component Value Date/Time    WBC 5.2 03/28/2024 10:14 AM    RBC 4.93 03/28/2024 10:14 AM    HEMOGLOBIN 14.8 03/28/2024 10:14 AM    HEMATOCRIT 45.6 03/28/2024 10:14 AM    MCV 92.5 03/28/2024 10:14 AM    MCH 30.0 03/28/2024 10:14 AM    MCHC 32.5 03/28/2024 10:14 AM    MPV 12.2 03/28/2024 10:14 AM    NEUTSPOLYS 65.00 03/28/2024 10:14 AM    LYMPHOCYTES 21.40 (L) 03/28/2024 10:14 AM    MONOCYTES 9.10 03/28/2024 10:14 AM    EOSINOPHILS 3.10 03/28/2024 10:14 AM    BASOPHILS 1.00 03/28/2024 10:14 AM        Lab Results   Component Value Date/Time    HBA1C 5.6 06/25/2014 11:27 AM          Imaging:   I personally reviewed following images    MRI cervical spine 6/ 17/2020  There is mild to moderate central canal stenosis at C5-6 with severe left and moderate right neuroforaminal stenosis.  There is mild central canal stenosis at C6-7 with moderate bilateral neuroforaminal stenosis.  There is facet arthropathy right at C2-3.  Also facet arthropathy right worse than left at C3-4, C4-5, C5-6.    I " reviewed the following radiology reports                                 MRI lumbar spine 5/3/2022        Results for orders placed during the hospital encounter of 20   MR-CERVICAL SPINE-W/O    Impression 1.  Mild spinal canal narrowing at C5-6 and C6-7    2.  Foraminal stenoses at C5-6 and C6-7    3.  Multilevel disc bulge, endplate spurring, and facet arthropathy                     Results for orders placed during the hospital encounter of 20   DX-CERVICAL SPINE-WITH FLEX-EXT   7+    Impression 1.  No compression deformity or acute fracture.    2.  There is degenerative disc disease and facet arthropathy with bilateral osseous neural foraminal narrowing.    3.  No focal instability is noted on flexion extension views.                            ELECTRODIAGNOSTICS  EMG/nerve conduction study performed on  22  This is an abnormal study.  There is electrophysiologic evidence of mild right median neuropathy at the wrist (carpal tunnel syndrome).  There is no evidence of right cervical (C5-T1) radiculopathy.  Clinical correlation is recommended.                                                               DIAGNOSIS   Visit Diagnoses     ICD-10-CM   1. Carpal tunnel syndrome, bilateral  G56.01   2. Sacroiliac joint dysfunction of right side  M53.3   3. Chronic right hip pain  M25.551    G89.29   4. Cervical radiculopathy  M54.12   5. Cervical spinal stenosis  M48.02   6. Cervical spondylosis  M47.812   7. Lumbar radiculopathy  M54.16   8. Lumbar spondylosis  M47.816         ASSESSMENT and PLAN:     Shawn Westbrook  1955,   female      Shawn was seen today for follow-up.    Diagnoses and all orders for this visit:    Carpal tunnel syndrome, bilateral  -     Consent for all Surgical, Special Diagnostic or Therapeutic Procedures  -     dexamethasone (Decadron) injection 4 mg    Sacroiliac joint dysfunction of right side  -     Referral to Physical Medicine Rehab    Chronic right hip pain  -      DX-HIP-COMPLETE - UNILATERAL 2+ RIGHT; Future    Cervical radiculopathy    Cervical spinal stenosis    Cervical spondylosis    Lumbar radiculopathy    Lumbar spondylosis      In regards to the carpal tunnel syndrome is causing the majority the patient symptoms at this time we decided to proceed with a right carpal tunnel injection.  She responded well to previous injection.  The patient declined surgical referral.  We discussed additions to home exercise program and to continue neutral wrist splint nightly.    In regards to the back pain the pain secondary to lumbar radiculopathy is improved.  Cervical radiculopathy improved.    The right low back and hip pain is likely secondary to a combination of hip arthritis as well as sacroiliac joint dysfunction.  We discussed options for this.  Will proceed with a right sacroiliac joint injection for diagnostic and therapeutic purposes with fluoroscopic guidance.    The risks benefits and alternatives to this procedure were discussed and the patient wishes to proceed with the procedure. Risks include but are not limited to damage to surrounding structures, infection, bleeding, worsening of pain which can be permanent, weakness which can be permanent. Benefits include pain relief, improved function. Alternatives includes not doing the procedure.      Medications: Continue Lyrica as prescribed by PCP Princess Mena M.D.       Follow up: After the above procedures    Thank you for allowing me to participate in the care of this patient. If you have any questions please not hesitate to contact me.             Please note that this dictation was created using voice recognition software. I have made every reasonable attempt to correct obvious errors but there may be errors of grammar and content that I may have overlooked prior to finalization of this note.      Marco Rios MD  Interventional Spine and Sports Physiatry  Physical Medicine and Rehabilitation  [facility]  [facility]

## 2024-05-14 ENCOUNTER — APPOINTMENT (OUTPATIENT)
Dept: RADIOLOGY | Facility: REHABILITATION | Age: 69
End: 2024-05-14
Attending: PHYSICAL MEDICINE & REHABILITATION
Payer: MEDICARE

## 2024-05-14 ENCOUNTER — HOSPITAL ENCOUNTER (OUTPATIENT)
Facility: REHABILITATION | Age: 69
End: 2024-05-14
Attending: PHYSICAL MEDICINE & REHABILITATION | Admitting: PHYSICAL MEDICINE & REHABILITATION
Payer: MEDICARE

## 2024-05-14 VITALS
DIASTOLIC BLOOD PRESSURE: 74 MMHG | WEIGHT: 166.01 LBS | RESPIRATION RATE: 16 BRPM | BODY MASS INDEX: 29.41 KG/M2 | HEIGHT: 63 IN | TEMPERATURE: 97.5 F | OXYGEN SATURATION: 96 % | SYSTOLIC BLOOD PRESSURE: 122 MMHG | HEART RATE: 64 BPM

## 2024-05-14 RX ORDER — DEXAMETHASONE SODIUM PHOSPHATE 10 MG/ML
INJECTION, SOLUTION INTRAMUSCULAR; INTRAVENOUS
Status: COMPLETED
Start: 2024-05-14 | End: 2024-05-14

## 2024-05-14 RX ORDER — LIDOCAINE HYDROCHLORIDE 10 MG/ML
INJECTION, SOLUTION EPIDURAL; INFILTRATION; INTRACAUDAL; PERINEURAL
Status: COMPLETED
Start: 2024-05-14 | End: 2024-05-14

## 2024-05-14 RX ADMIN — LIDOCAINE HYDROCHLORIDE 10 ML: 10 INJECTION, SOLUTION EPIDURAL; INFILTRATION; INTRACAUDAL; PERINEURAL at 09:43

## 2024-05-14 RX ADMIN — DEXAMETHASONE SODIUM PHOSPHATE 10 MG: 10 INJECTION, SOLUTION INTRAMUSCULAR; INTRAVENOUS at 09:43

## 2024-05-14 RX ADMIN — IOHEXOL 10 ML: 240 INJECTION, SOLUTION INTRATHECAL; INTRAVASCULAR; INTRAVENOUS; ORAL at 09:43

## 2024-05-14 ASSESSMENT — FIBROSIS 4 INDEX: FIB4 SCORE: 1.28

## 2024-05-14 ASSESSMENT — PAIN DESCRIPTION - PAIN TYPE
TYPE: CHRONIC PAIN
TYPE: CHRONIC PAIN

## 2024-05-14 NOTE — OR SURGEON
Immediate Post OP Note    Pre-Op Diagnosis Codes:     * Sacroiliac joint dysfunction      Post-Op Diagnosis Codes:     * Sacroiliac joint dysfunction       Procedure(s):  RIGHT and Left sacroiliac joint injection with fluoroscopic guidance - Wound Class: Clean    Surgeon(s):  Marco Rios M.D.    Anesthesiologist/Type of Anesthesia:  No anesthesia staff entered./Local    Surgical Staff:  Circulator: Sri Alfaro R.N.  Scrub Person: Kindra Urbina R.N.  Radiology Technologist: Hussain Mattson    Specimens removed if any:  * No specimens in log *    Estimated Blood Loss: None    Findings: None    Complications: None        5/14/2024 9:46 AM Marco Rios M.D.

## 2024-05-14 NOTE — PROGRESS NOTES
Dr. Rios in to see patient, questions answered. Procedure changed to Bilateral per Dr. Rios orders  0949 Dr. Rios in to see patient, discharge orders received

## 2024-05-14 NOTE — OP REPORT
Date of Service: 5/14/2024     Patient: Shawn Westbrook 68 y.o. female     MRN: [id number]     Physician/s: Marco Rios MD    Pre-operative Diagnosis: Sacroiliac dysfunction    Post-operative Diagnosis: Sacroiliac dysfunction     Procedure: right and left   Sacroiliac joint injection    Description of procedure:    The risks, benefits, and alternatives of the procedure were reviewed and discussed with the patient.  Written informed consent was freely obtained. A pre-procedural time-out was conducted by the physician verifying patient’s identity, procedure to be performed, procedure site and side, and allergy verification. Appropriate equipment was determined to be in place for the procedure.         In the fluoroscopy suite the patient was placed in a prone position and the skin was prepped and draped in the usual sterile fashion.     The fluoroscope was placed over the left  sacroiliac joint at the appropriate angle for joint entrance, and the target for injection was marked. A 27g needle was placed into each of the marked level(s), and approx 1mL of 1% Lidocaine was injected subcutaneously into the epidermal and dermal layers. The needle was removed. A 25g 3.5 inch needle was then placed down to the level of bone at the posterior aspect of the joint. The needle was then carefully advanced into the joint capsule. The needle tip was then verified by a lateral view. In the AP view, contrast dye was used to highlight the joint line while the fluoroscope was running live. Following negative aspiration, approx 0.5mL of 1% lidocaine  with 0.5mL of 10mg/mL dexamethasone was then injected. The needle was removed intact after restyleted.  The exact same procedure was then repeated on the right sacroiliac joint.      The patient's low back was covered with a 4x4 gauze, the area was cleansed with sterile normal saline, and a dressing was applied. There were no complications noted.     The patient had 80% pain relief  postprocedure  Preprocedure pain 8/10 NRS index pain with ANGELITA  Postprocedure pain 2 /10 NRS index pain with ANGELITA    Follow-up as scheduled    Marco Rios MD  Physical Medicine and Rehabilitation  Interventional Spine and Sports Physiatry  [facility]  5/14/2024        CPT  sacroiliac joint with fluoroscopy [id number]. Please note that facilities often bill  instead of the physician code [id number].

## 2024-05-14 NOTE — INTERVAL H&P NOTE
H&P reviewed.  The following changes are noted    Patient having bilateral sacroiliac joint pain and joint dysfunction.  These are nearly equal now.  6/10 intensity NRS bilateral sacroiliac joint pain.    Sacroiliac joint maneuvers  Thigh thrust positive bilaterally    Jong's positive bilaterally    Gaenslen's positive bilaterally    SI joint distraction positive bilaterally    SI joint compression positive bilaterally    Tenderness to palpation of sacroiliac joint: positive bilaterally       Lungs clear to auscultation bilaterally.  No abdominal tenderness.  Heart regular rate and rhythm.  Vitals reviewed.    Proceed with the originally scheduled procedure.  The risks, benefits and alternatives were discussed.  The patient understands these.    Pre-Op Diagnosis Codes:  sacroiliac joint dysfunction  Procedure(s):  bilateral sacroiliac joint injection with fluoroscopic guidance    Marco Rios MD  Physical Medicine and Rehabilitation  Interventional Spine and Sports Physiatry  [facility]

## 2024-05-15 ENCOUNTER — TELEPHONE (OUTPATIENT)
Dept: PHYSICAL MEDICINE AND REHAB | Facility: MEDICAL CENTER | Age: 69
End: 2024-05-15
Payer: MEDICARE

## 2024-05-15 NOTE — TELEPHONE ENCOUNTER
Called for Post -SP check up: right sacroiliac joint injection with fluoroscopic guidance     Change in pain:    Concerns?    Confirmed [facility] appt?

## 2024-06-14 ENCOUNTER — OFFICE VISIT (OUTPATIENT)
Dept: MEDICAL GROUP | Facility: PHYSICIAN GROUP | Age: 69
End: 2024-06-14
Payer: MEDICARE

## 2024-06-14 VITALS
HEIGHT: 63 IN | SYSTOLIC BLOOD PRESSURE: 110 MMHG | OXYGEN SATURATION: 97 % | WEIGHT: 163.2 LBS | BODY MASS INDEX: 28.92 KG/M2 | RESPIRATION RATE: 16 BRPM | HEART RATE: 68 BPM | TEMPERATURE: 97.7 F | DIASTOLIC BLOOD PRESSURE: 70 MMHG

## 2024-06-14 DIAGNOSIS — M54.50 LUMBAR SPINE PAIN: ICD-10-CM

## 2024-06-14 DIAGNOSIS — M25.561 CHRONIC PAIN OF RIGHT KNEE: ICD-10-CM

## 2024-06-14 DIAGNOSIS — G89.29 CHRONIC PAIN OF RIGHT KNEE: ICD-10-CM

## 2024-06-14 PROCEDURE — 3074F SYST BP LT 130 MM HG: CPT | Performed by: FAMILY MEDICINE

## 2024-06-14 PROCEDURE — 99214 OFFICE O/P EST MOD 30 MIN: CPT | Performed by: FAMILY MEDICINE

## 2024-06-14 PROCEDURE — 3078F DIAST BP <80 MM HG: CPT | Performed by: FAMILY MEDICINE

## 2024-06-14 RX ORDER — TRAMADOL HYDROCHLORIDE 50 MG/1
50 TABLET ORAL EVERY 8 HOURS PRN
Qty: 21 TABLET | Refills: 0 | Status: SHIPPED | OUTPATIENT
Start: 2024-06-14 | End: 2024-06-21

## 2024-06-14 ASSESSMENT — FIBROSIS 4 INDEX: FIB4 SCORE: 1.28

## 2024-06-14 NOTE — PROGRESS NOTES
Subjective:     CC:   Chief Complaint   Patient presents with    Follow-Up       HPI:   Shawn presents today with complaint of right knee pain.  Patient states that she had a knee replacement done to that right knee over 20 years ago.  Now it swollen at times.  Patient is seeing Dr. Rios for her upper lower and hip issues.  She is getting injections done for this.  Patient is not taking her Lyrica as much she is concerned she is read it can cause memory issues.  Patient is only taking it at bedtime.  Patient is wanting a refill of her tramadol she still has a couple tablets left she uses it very sparingly.    Past Medical History:   Diagnosis Date    Chronic low back pain     Hypertension     MVP (mitral valve prolapse)     Pericarditis        Social History     Tobacco Use    Smoking status: Former     Current packs/day: 0.00     Types: Cigarettes     Start date: 1980     Quit date: 1988     Years since quittin.0    Smokeless tobacco: Never   Vaping Use    Vaping status: Never Used   Substance Use Topics    Alcohol use: Yes     Alcohol/week: 0.0 oz     Comment: rare    Drug use: No       Current Outpatient Medications Ordered in Epic   Medication Sig Dispense Refill    traMADol (ULTRAM) 50 MG Tab Take 1 Tablet by mouth every 8 hours as needed for Severe Pain for up to 7 days. 21 Tablet 0    simvastatin (ZOCOR) 20 MG Tab TAKE 1 TABLET BY MOUTH EVERY EVENING 90 Tablet 1    cyclobenzaprine (FLEXERIL) 10 mg Tab Take 1 Tablet by mouth 3 times a day as needed for Moderate Pain or Muscle Spasms. 90 Tablet 0    methylPREDNISolone (MEDROL DOSEPAK) 4 MG Tablet Therapy Pack Follow schedule on package instructions. 21 Tablet 0    meloxicam (MOBIC) 15 MG tablet TAKE 1 TABLET BY MOUTH EVERY DAY WITH FOOD. NO ADVIL/ALEVE/MOTRIN/IBUPROFEN ON THE SAME DAY 90 Tablet 1    omeprazole (PRILOSEC) 40 MG delayed-release capsule Take 1 Capsule by mouth every day. 90 Capsule 1    tretinoin (RETIN-A) 0.025 % cream APPLY  "PEA SIZED AMOUNT TOPICALLY TO FACE EVERY 3 NIGHTS. INCREASE TO EVERY NIGHT AS IRRITATION ALLOWS      estradiol (ESTRACE) 1 MG Tab TAKE 1 TABLET BY MOUTH EVERY DAY 90 Tablet 3     No current Epic-ordered facility-administered medications on file.       Allergies:  Patient has no known allergies.    Health Maintenance: Completed    ROS:  Gen: no fevers/chills, no changes in weight  Eyes: no changes in vision  ENT: no sore throat, no hearing loss, no bloody nose  Pulm: no sob, no cough  CV: no chest pain, no palpitations  GI: no nausea/vomiting, no diarrhea  : no dysuria  Neuro: no headaches, no numbness/tingling  Heme/Lymph: no easy bruising    Objective:     Exam:  /70 (BP Location: Left arm, Patient Position: Sitting, BP Cuff Size: Adult)   Pulse 68   Temp 36.5 °C (97.7 °F) (Temporal)   Resp 16   Ht 1.6 m (5' 3\")   Wt 74 kg (163 lb 3.2 oz)   SpO2 97%   BMI 28.91 kg/m²  Body mass index is 28.91 kg/m².    Gen: Alert and oriented, No apparent distress.  Skin: Warm and dry.  No obvious lesions.  Eyes: Sclera wnl Pupils normal in size  Lungs: Normal effort, CTA bilaterally, no wheezes, rhonchi, or rales  CV: Regular rate and rhythm. No murmurs, rubs, or gallops.  Musculoskeletal: Normal gait. No extremity cyanosis, clubbing, or edema.  Neuro: Oriented to person, place and time  Psych: Mood is wnl       Assessment & Plan:     68 y.o. female with the following -     1. Chronic pain of right knee  I would recommend patient go to Ortho patient has been at [facility] in the past would recommend she go to the walk-in clinic today to be evaluated.    2. Lumbar spine pain  Patient does have appointment with Dr. Rios on Monday.  Will give patient only 21 tablets that should last her 2 to 3 months.  Patient at this time does not want to continue taking the Lyrica and wants to start titrating herself off.  I did mention to patient that the narcotic that she is taking can also cause problems with focusing and would like " her to continue taking the Lyrica especially if it is benefiting her.  - traMADol (ULTRAM) 50 MG Tab; Take 1 Tablet by mouth every 8 hours as needed for Severe Pain for up to 7 days.  Dispense: 21 Tablet; Refill: 0       Return in about 3 weeks (around 7/5/2024), or if symptoms worsen or fail to improve.  Patient does have a follow-up appointment with me in early July prior to that she needs to get her lab work done.    Please note that this dictation was created using voice recognition software. I have made every reasonable attempt to correct obvious errors, but I expect that there are errors of grammar and possibly content that I did not discover before finalizing the note.

## 2024-06-17 ENCOUNTER — APPOINTMENT (OUTPATIENT)
Dept: PHYSICAL MEDICINE AND REHAB | Facility: MEDICAL CENTER | Age: 69
End: 2024-06-17
Payer: MEDICARE

## 2024-06-17 VITALS
DIASTOLIC BLOOD PRESSURE: 71 MMHG | TEMPERATURE: 97.4 F | SYSTOLIC BLOOD PRESSURE: 102 MMHG | OXYGEN SATURATION: 98 % | BODY MASS INDEX: 27.83 KG/M2 | WEIGHT: 163 LBS | HEART RATE: 75 BPM | HEIGHT: 64 IN

## 2024-06-17 DIAGNOSIS — M53.3 SACROILIAC JOINT DYSFUNCTION OF RIGHT SIDE: ICD-10-CM

## 2024-06-17 DIAGNOSIS — M54.16 LUMBAR RADICULOPATHY: ICD-10-CM

## 2024-06-17 DIAGNOSIS — G89.29 CHRONIC RIGHT-SIDED LOW BACK PAIN WITH RIGHT-SIDED SCIATICA: ICD-10-CM

## 2024-06-17 DIAGNOSIS — M47.816 LUMBAR SPONDYLOSIS: ICD-10-CM

## 2024-06-17 DIAGNOSIS — M25.531 RIGHT WRIST PAIN: ICD-10-CM

## 2024-06-17 DIAGNOSIS — M54.41 CHRONIC RIGHT-SIDED LOW BACK PAIN WITH RIGHT-SIDED SCIATICA: ICD-10-CM

## 2024-06-17 DIAGNOSIS — G56.01 RIGHT CARPAL TUNNEL SYNDROME: ICD-10-CM

## 2024-06-17 PROCEDURE — 3074F SYST BP LT 130 MM HG: CPT | Performed by: PHYSICAL MEDICINE & REHABILITATION

## 2024-06-17 PROCEDURE — 1125F AMNT PAIN NOTED PAIN PRSNT: CPT | Performed by: PHYSICAL MEDICINE & REHABILITATION

## 2024-06-17 PROCEDURE — G2211 COMPLEX E/M VISIT ADD ON: HCPCS | Performed by: PHYSICAL MEDICINE & REHABILITATION

## 2024-06-17 PROCEDURE — 3078F DIAST BP <80 MM HG: CPT | Performed by: PHYSICAL MEDICINE & REHABILITATION

## 2024-06-17 PROCEDURE — 99213 OFFICE O/P EST LOW 20 MIN: CPT | Performed by: PHYSICAL MEDICINE & REHABILITATION

## 2024-06-17 ASSESSMENT — PATIENT HEALTH QUESTIONNAIRE - PHQ9: CLINICAL INTERPRETATION OF PHQ2 SCORE: 0

## 2024-06-17 ASSESSMENT — PAIN SCALES - GENERAL: PAINLEVEL: 3=SLIGHT PAIN

## 2024-06-17 ASSESSMENT — FIBROSIS 4 INDEX: FIB4 SCORE: 1.28

## 2024-06-17 NOTE — PROGRESS NOTES
Follow up patient note  Interventional spine and sports physiatry, Physical medicine rehabilitation      Chief complaint:   Chief Complaint   Patient presents with    Follow-Up     Hand numbness          HISTORY    Please see new patient note by Dr Rios,  for more details.     HPI  Patient identification: Shawn Westbrook  1955,   female  With Diagnoses of Lumbar radiculopathy, Sacroiliac joint dysfunction of right side, Lumbar spondylosis, Chronic right-sided low back pain with right-sided sciatica, Right carpal tunnel syndrome, and Right wrist pain were pertinent to this visit.     Procedures:  2020 C7-T1 interlaminar epidural steroid injection with 100% improvement in pain and function in regards to the neck and arm pain after the injection follow-up visit on 2020 C7-T1 cervical interlaminar epidural steroid injection initially with 60% improvement but this only lasted for a few weeks  11/10/2022 right long digit trigger finger injection 100% relief of triggering  11/10/2022 right carpal tunnel injection 100% pain relief  1/10/2023 C7-T1 cervical interlaminar epidural steroid injection 100% pain relief  2024 C7-T1 left cervical interlaminar epidural steroid injection 90% pain relief postprocedure  2024 bilateral L5-S1 transforaminal epidural steroid injection 80% improvement in radiating pain.  4/15/2024 right carpal tunnel injection  2024 bilateral sacroiliac joint injection 70% improvement in pain in the sacroiliac joint.  Preprocedure pain 6/ 10 NRS postprocedure pain 1/10 NRS dislocation    Neck pain stable.      She had improvement in her symptoms from carpal tunnel syndrome after the injection done to the previous visit however she continues to have wrist pain more on the dorsal aspect of the wrist.  This is moderate in intensity.    She had a recurrence of her right lumbar radiculopathy rating down the posterolateral aspect of the leg towards the foot and  ankle including numbness tingling burning sensation.  Leg pain is worse than back pain.  Worse with sitting.  Worse with forward flexion.  She was seen by [facility].  She reports she has an MRI upcoming as well as an EMG with [facility] and she has to follow-up with Dr. Gonzalez.        The patient has done a provider driven home exercise program including the past 6 months.     Medications tried include gabapentin which had to be stopped because of side effects.  Patient is on Lyrica now. She is also use meloxicam and Flexeril.           ROS Red Flags :   Fever, Chills, Sweats: Denies  Involuntary Weight Loss: Denies  Bowel/Bladder Incontinence: Denies  Saddle Anesthesia: Denies        PMHx:   Past Medical History:   Diagnosis Date    Chronic low back pain     Hypertension     MVP (mitral valve prolapse)     Pericarditis 2010       PSHx:   Past Surgical History:   Procedure Laterality Date    MD INJECTION,SACROILIAC JOINT Bilateral 5/14/2024    Procedure: RIGHT and Left sacroiliac joint injection with fluoroscopic guidance;  Surgeon: Marco Rios M.D.;  [facility];  Service: Pain Management    MD NJX AA&/STRD TFRML EPI LUMBAR/SACRAL 1 LEVEL Bilateral 2/13/2024    Procedure: BILATERAL L5-S1 versus L4-5 transforaminal epidural steroid injection with fluoroscopic guidance.    Note: cervical injection approximately 1 month prior to the lumbar;  Surgeon: Marco Rios M.D.;  [facility];  Service: Pain Management    MD INJ CERV/THORAC,W/ IMAGING Left 1/16/2024    Procedure: LEFT cervical C7-T1 interlaminar epidural steroid injection.    Note: cervical approximately 1 month prior to the lumbar;  Surgeon: Marco Rios M.D.;  [facility]: [facility];  Service: Pain Management    MD INJ CERV/THORAC,W/ IMAGING N/A 1/10/2023    Procedure: C7-T1 interlaminar epidural steroid injection;  Surgeon: Marco Rios M.D.;  [facility];  Service: Pain Management    DE INJ CERV/THORAC,W/ IMAGING N/A 9/27/2022    Procedure: C7-T1 interlaminar epidural steroid injection;  Surgeon: Marco Rios M.D.;  Location: [facility];  Service: Pain Management    ABDOMINAL HYSTERECTOMY TOTAL      cervix remains and one ovary.     ARTHROPLASTY Bilateral     Total knee replacements    LAMINOTOMY      L4-5, L5-S1 fusion    OTHER ORTHOPEDIC SURGERY      spinal fusion    OTHER ORTHOPEDIC SURGERY      right knee replacement       Family history   Family History   Problem Relation Age of Onset    Heart Disease Mother     Heart Disease Father     Cancer Father     Alcohol abuse Father     Diabetes Sister     Diabetes Sister     Breast Cancer Maternal Grandmother     Dementia Paternal Grandmother          Medications:   Outpatient Medications Marked as Taking for the 6/17/24 encounter (Office Visit) with Marco Rios M.D.   Medication Sig Dispense Refill    traMADol (ULTRAM) 50 MG Tab Take 1 Tablet by mouth every 8 hours as needed for Severe Pain for up to 7 days. 21 Tablet 0    simvastatin (ZOCOR) 20 MG Tab TAKE 1 TABLET BY MOUTH EVERY EVENING 90 Tablet 1    cyclobenzaprine (FLEXERIL) 10 mg Tab Take 1 Tablet by mouth 3 times a day as needed for Moderate Pain or Muscle Spasms. 90 Tablet 0    meloxicam (MOBIC) 15 MG tablet TAKE 1 TABLET BY MOUTH EVERY DAY WITH FOOD. NO ADVIL/ALEVE/MOTRIN/IBUPROFEN ON THE SAME DAY 90 Tablet 1    omeprazole (PRILOSEC) 40 MG delayed-release capsule Take 1 Capsule by mouth every day. 90 Capsule 1    tretinoin (RETIN-A) 0.025 % cream APPLY PEA SIZED AMOUNT TOPICALLY TO FACE EVERY 3 NIGHTS. INCREASE TO EVERY NIGHT AS IRRITATION ALLOWS      estradiol (ESTRACE) 1 MG Tab TAKE 1 TABLET BY MOUTH EVERY DAY 90 Tablet 3        Current Outpatient Medications on File Prior to Visit   Medication Sig Dispense Refill    traMADol (ULTRAM) 50 MG Tab Take 1 Tablet by mouth every 8 hours as needed for Severe Pain for up to 7  days. 21 Tablet 0    simvastatin (ZOCOR) 20 MG Tab TAKE 1 TABLET BY MOUTH EVERY EVENING 90 Tablet 1    cyclobenzaprine (FLEXERIL) 10 mg Tab Take 1 Tablet by mouth 3 times a day as needed for Moderate Pain or Muscle Spasms. 90 Tablet 0    meloxicam (MOBIC) 15 MG tablet TAKE 1 TABLET BY MOUTH EVERY DAY WITH FOOD. NO ADVIL/ALEVE/MOTRIN/IBUPROFEN ON THE SAME DAY 90 Tablet 1    omeprazole (PRILOSEC) 40 MG delayed-release capsule Take 1 Capsule by mouth every day. 90 Capsule 1    tretinoin (RETIN-A) 0.025 % cream APPLY PEA SIZED AMOUNT TOPICALLY TO FACE EVERY 3 NIGHTS. INCREASE TO EVERY NIGHT AS IRRITATION ALLOWS      estradiol (ESTRACE) 1 MG Tab TAKE 1 TABLET BY MOUTH EVERY DAY 90 Tablet 3     No current facility-administered medications on file prior to visit.         Allergies:   No Known Allergies      Social Hx:   Social History     Socioeconomic History    Marital status:      Spouse name: Not on file    Number of children: Not on file    Years of education: Not on file    Highest education level: Not on file   Occupational History     Employer: OTHER     Comment: retired   Tobacco Use    Smoking status: Former     Current packs/day: 0.00     Types: Cigarettes     Start date: 1980     Quit date: 1988     Years since quittin.0    Smokeless tobacco: Never   Vaping Use    Vaping status: Never Used   Substance and Sexual Activity    Alcohol use: Yes     Alcohol/week: 0.0 oz     Comment: rare    Drug use: No    Sexual activity: Not Currently     Comment:    Other Topics Concern     Service No    Blood Transfusions No    Caffeine Concern No    Occupational Exposure No    Hobby Hazards No    Sleep Concern Yes     Comment: due to pain    Stress Concern Yes    Weight Concern No    Special Diet No    Back Care No    Exercise Yes    Bike Helmet No    Seat Belt Yes    Self-Exams Yes   Social History Narrative    Not on file     Social Determinants of Health     Financial Resource Strain: Not  "on file   Food Insecurity: Not on file   Transportation Needs: Not on file   Physical Activity: Not on file   Stress: Not on file   Social Connections: Not on file   Intimate Partner Violence: Not on file   Housing Stability: Not on file         EXAMINATION     Physical Exam:   Vitals: /71 (BP Location: Right arm, Patient Position: Sitting, BP Cuff Size: Adult)   Pulse 75   Temp 36.3 °C (97.4 °F) (Temporal)   Ht 1.626 m (5' 4\")   Wt 73.9 kg (163 lb)   SpO2 98%     Constitutional:   Body Habitus: Body mass index is 27.98 kg/m².  Cooperation: Fully cooperates with exam  Appearance: Well-groomed no disheveled    Respiratory-  breathing comfortable on room air, no audible wheezing  Cardiovascular- capillary refills less than 2 seconds. No lower extremity edema is noted.   Psychiatric- alert and oriented ×3. Normal affect.    MSK and Neuro: -    Sacroiliac joint maneuvers  Thigh thrust negative bilaterally    Jong's negative bilaterally    Gaenslen's negative bilaterally    SI joint distraction negative bilaterally    SI joint compression negative bilaterally    Tenderness to palpation of sacroiliac joint: negative bilaterally         Thoracic/Lumbar Spine/Sacral Spine/Hips   There are no signs of infection around the injection sites.   decreased active range of motion with flexion, lateral flexion, and rotation bilaterally.   There is decreased active range of motion with lumbar extension.        Palpation:   No tenderness to palpation in midline at T1-T12 levels. No tenderness to palpation in the left and right of the midline T1-L5, NEGATIVE for tenderness to palpation to the para-midline region in the lower lumbar levels.  palpation over SI joint: negative bilaterally    palpation in hip or over the gluteus medius tendon insertion: negative bilaterally      Lumbar spine Special tests  Neuro tension  Straight leg test positive right, negative left    Slump test positive right, negative left      Key points " "for the international standards for neurological classification of spinal cord injury (ISNCSCI) to light touch.     Dermatome R L                                      L2 2 2   L3 2 2   L4 2 2   L5 1 2   S1 2 2   S2 2 2         Motor Exam Lower Extremities    ? Myotome R L   Hip flexion L2 5 5   Knee extension L3 5 5   Ankle dorsiflexion L4 5 5   Toe extension L5 4 5   Ankle plantarflexion S1 5 5         MEDICAL DECISION MAKING    DATA    Labs:   Lab Results   Component Value Date/Time    SODIUM 141 03/28/2024 10:14 AM    POTASSIUM 4.1 03/28/2024 10:14 AM    CHLORIDE 104 03/28/2024 10:14 AM    CO2 25 03/28/2024 10:14 AM    GLUCOSE 95 03/28/2024 10:14 AM    BUN 15 03/28/2024 10:14 AM    CREATININE 0.76 03/28/2024 10:14 AM        No results found for: \"PROTHROMBTM\", \"INR\"     Lab Results   Component Value Date/Time    WBC 5.2 03/28/2024 10:14 AM    RBC 4.93 03/28/2024 10:14 AM    HEMOGLOBIN 14.8 03/28/2024 10:14 AM    HEMATOCRIT 45.6 03/28/2024 10:14 AM    MCV 92.5 03/28/2024 10:14 AM    MCH 30.0 03/28/2024 10:14 AM    MCHC 32.5 03/28/2024 10:14 AM    MPV 12.2 03/28/2024 10:14 AM    NEUTSPOLYS 65.00 03/28/2024 10:14 AM    LYMPHOCYTES 21.40 (L) 03/28/2024 10:14 AM    MONOCYTES 9.10 03/28/2024 10:14 AM    EOSINOPHILS 3.10 03/28/2024 10:14 AM    BASOPHILS 1.00 03/28/2024 10:14 AM        Lab Results   Component Value Date/Time    HBA1C 5.6 06/25/2014 11:27 AM          Imaging:   I personally reviewed following images    MRI cervical spine 6/ 17/2020  There is mild to moderate central canal stenosis at C5-6 with severe left and moderate right neuroforaminal stenosis.  There is mild central canal stenosis at C6-7 with moderate bilateral neuroforaminal stenosis.  There is facet arthropathy right at C2-3.  Also facet arthropathy right worse than left at C3-4, C4-5, C5-6.    4/15/2024 XR hip  Mild degenerative changes in the hip.    I reviewed the following radiology reports                                 Xr HIP " 4/15/2024  IMPRESSION:   1.  No RIGHT hip or pelvic fracture.  2.  Mild degenerative change of both hips.  3.  Degenerative and postoperative changes of lower lumbar spine.    MRI lumbar spine 5/3/2022        Results for orders placed during the hospital encounter of 20   MR-CERVICAL SPINE-W/O    Impression 1.  Mild spinal canal narrowing at C5-6 and C6-7    2.  Foraminal stenoses at C5-6 and C6-7    3.  Multilevel disc bulge, endplate spurring, and facet arthropathy                     Results for orders placed during the hospital encounter of 20   DX-CERVICAL SPINE-WITH FLEX-EXT   7+    Impression 1.  No compression deformity or acute fracture.    2.  There is degenerative disc disease and facet arthropathy with bilateral osseous neural foraminal narrowing.    3.  No focal instability is noted on flexion extension views.                            ELECTRODIAGNOSTICS  EMG/nerve conduction study performed on  22  This is an abnormal study.  There is electrophysiologic evidence of mild right median neuropathy at the wrist (carpal tunnel syndrome).  There is no evidence of right cervical (C5-T1) radiculopathy.  Clinical correlation is recommended.                                                               DIAGNOSIS   Visit Diagnoses     ICD-10-CM   1. Lumbar radiculopathy  M54.16   2. Sacroiliac joint dysfunction of right side  M53.3   3. Lumbar spondylosis  M47.816   4. Chronic right-sided low back pain with right-sided sciatica  M54.41    G89.29   5. Right carpal tunnel syndrome  G56.01   6. Right wrist pain  M25.531         ASSESSMENT and PLAN:     Shawn Westbrook  1955,   female      Shawn was seen today for follow-up.    Diagnoses and all orders for this visit:    Lumbar radiculopathy    Sacroiliac joint dysfunction of right side    Lumbar spondylosis    Chronic right-sided low back pain with right-sided sciatica    Right carpal tunnel syndrome  -     Referral to Hand  Surgery    Right wrist pain  -     Referral to Hand Surgery      An EMG was ordered by [facility] to be done through [facility] for the bilateral lower extremities.  I agree with the MRI lumbar spine ordered by [facility].  It appears the patient is having recurrence of her lumbar radiculopathy.  Sacroiliac joint dysfunction is stable.  I agree with the above diagnostic workup and the patient following up with Dr. Gonzalez as well.    Patient improvement in her symptoms of carpal tunnel syndrome after the injection however she does continue to have wrist pain.  This is acute on chronic.  I referred the patient to hand surgery for consultation.    Follow up: After the above diagnostic tests and consult    Thank you for allowing me to participate in the care of this patient. If you have any questions please not hesitate to contact me.             Please note that this dictation was created using voice recognition software. I have made every reasonable attempt to correct obvious errors but there may be errors of grammar and content that I may have overlooked prior to finalization of this note.      Marco Rios MD  Interventional Spine and Sports Physiatry  Physical Medicine and Rehabilitation  [facility]

## 2024-07-02 DIAGNOSIS — M25.50 ARTHRALGIA, UNSPECIFIED JOINT: ICD-10-CM

## 2024-07-02 DIAGNOSIS — E78.00 HYPERCHOLESTEROLEMIA: ICD-10-CM

## 2024-07-02 DIAGNOSIS — L71.9 ROSACEA: ICD-10-CM

## 2024-07-05 ENCOUNTER — HOSPITAL ENCOUNTER (OUTPATIENT)
Dept: LAB | Facility: MEDICAL CENTER | Age: 69
End: 2024-07-05
Attending: FAMILY MEDICINE
Payer: MEDICARE

## 2024-07-05 DIAGNOSIS — E78.00 HYPERCHOLESTEROLEMIA: ICD-10-CM

## 2024-07-05 DIAGNOSIS — M25.50 ARTHRALGIA, UNSPECIFIED JOINT: ICD-10-CM

## 2024-07-05 DIAGNOSIS — L71.9 ROSACEA: ICD-10-CM

## 2024-07-05 LAB
ALBUMIN SERPL BCP-MCNC: 4.2 G/DL (ref 3.2–4.9)
ALBUMIN/GLOB SERPL: 1.7 G/DL
ALP SERPL-CCNC: 72 U/L (ref 30–99)
ALT SERPL-CCNC: 50 U/L (ref 2–50)
ANION GAP SERPL CALC-SCNC: 12 MMOL/L (ref 7–16)
AST SERPL-CCNC: 43 U/L (ref 12–45)
BASOPHILS # BLD AUTO: 1.1 % (ref 0–1.8)
BASOPHILS # BLD: 0.06 K/UL (ref 0–0.12)
BILIRUB SERPL-MCNC: 0.5 MG/DL (ref 0.1–1.5)
BUN SERPL-MCNC: 20 MG/DL (ref 8–22)
CALCIUM ALBUM COR SERPL-MCNC: 9.8 MG/DL (ref 8.5–10.5)
CALCIUM SERPL-MCNC: 10 MG/DL (ref 8.5–10.5)
CHLORIDE SERPL-SCNC: 104 MMOL/L (ref 96–112)
CHOLEST SERPL-MCNC: 202 MG/DL (ref 100–199)
CO2 SERPL-SCNC: 24 MMOL/L (ref 20–33)
CREAT SERPL-MCNC: 0.66 MG/DL (ref 0.5–1.4)
CRP SERPL HS-MCNC: <0.3 MG/DL (ref 0–0.75)
EOSINOPHIL # BLD AUTO: 0.12 K/UL (ref 0–0.51)
EOSINOPHIL NFR BLD: 2.1 % (ref 0–6.9)
ERYTHROCYTE [DISTWIDTH] IN BLOOD BY AUTOMATED COUNT: 50.5 FL (ref 35.9–50)
ERYTHROCYTE [SEDIMENTATION RATE] IN BLOOD BY WESTERGREN METHOD: 16 MM/HOUR (ref 0–25)
FASTING STATUS PATIENT QL REPORTED: NORMAL
GFR SERPLBLD CREATININE-BSD FMLA CKD-EPI: 95 ML/MIN/1.73 M 2
GLOBULIN SER CALC-MCNC: 2.5 G/DL (ref 1.9–3.5)
GLUCOSE SERPL-MCNC: 94 MG/DL (ref 65–99)
HCT VFR BLD AUTO: 42.3 % (ref 37–47)
HDLC SERPL-MCNC: 85 MG/DL
HGB BLD-MCNC: 13.7 G/DL (ref 12–16)
IMM GRANULOCYTES # BLD AUTO: 0.01 K/UL (ref 0–0.11)
IMM GRANULOCYTES NFR BLD AUTO: 0.2 % (ref 0–0.9)
LDLC SERPL CALC-MCNC: 102 MG/DL
LYMPHOCYTES # BLD AUTO: 1.54 K/UL (ref 1–4.8)
LYMPHOCYTES NFR BLD: 27 % (ref 22–41)
MCH RBC QN AUTO: 30.5 PG (ref 27–33)
MCHC RBC AUTO-ENTMCNC: 32.4 G/DL (ref 32.2–35.5)
MCV RBC AUTO: 94.2 FL (ref 81.4–97.8)
MONOCYTES # BLD AUTO: 0.48 K/UL (ref 0–0.85)
MONOCYTES NFR BLD AUTO: 8.4 % (ref 0–13.4)
NEUTROPHILS # BLD AUTO: 3.49 K/UL (ref 1.82–7.42)
NEUTROPHILS NFR BLD: 61.2 % (ref 44–72)
NRBC # BLD AUTO: 0 K/UL
NRBC BLD-RTO: 0 /100 WBC (ref 0–0.2)
PLATELET # BLD AUTO: 255 K/UL (ref 164–446)
PMV BLD AUTO: 12.7 FL (ref 9–12.9)
POTASSIUM SERPL-SCNC: 3.9 MMOL/L (ref 3.6–5.5)
PROT SERPL-MCNC: 6.7 G/DL (ref 6–8.2)
RBC # BLD AUTO: 4.49 M/UL (ref 4.2–5.4)
SODIUM SERPL-SCNC: 140 MMOL/L (ref 135–145)
TRIGL SERPL-MCNC: 77 MG/DL (ref 0–149)
URATE SERPL-MCNC: 4.9 MG/DL (ref 1.9–8.2)
WBC # BLD AUTO: 5.7 K/UL (ref 4.8–10.8)

## 2024-07-05 PROCEDURE — 85652 RBC SED RATE AUTOMATED: CPT

## 2024-07-05 PROCEDURE — 86140 C-REACTIVE PROTEIN: CPT

## 2024-07-05 PROCEDURE — 84550 ASSAY OF BLOOD/URIC ACID: CPT

## 2024-07-05 PROCEDURE — 36415 COLL VENOUS BLD VENIPUNCTURE: CPT

## 2024-07-05 PROCEDURE — 80053 COMPREHEN METABOLIC PANEL: CPT

## 2024-07-05 PROCEDURE — 85025 COMPLETE CBC W/AUTO DIFF WBC: CPT

## 2024-07-05 PROCEDURE — 80061 LIPID PANEL: CPT

## 2024-07-05 RX ORDER — OMEPRAZOLE 40 MG/1
40 CAPSULE, DELAYED RELEASE ORAL DAILY
Qty: 90 CAPSULE | Refills: 1 | Status: SHIPPED | OUTPATIENT
Start: 2024-07-05

## 2024-07-05 RX ORDER — MELOXICAM 15 MG/1
TABLET ORAL
Qty: 90 TABLET | Refills: 1 | Status: SHIPPED | OUTPATIENT
Start: 2024-07-05

## 2024-08-05 ENCOUNTER — HOSPITAL ENCOUNTER (OUTPATIENT)
Dept: LAB | Facility: MEDICAL CENTER | Age: 69
End: 2024-08-05
Attending: ORTHOPAEDIC SURGERY
Payer: MEDICARE

## 2024-08-05 DIAGNOSIS — T84.022A INSTABILITY OF INTERNAL RIGHT KNEE PROSTHESIS, INITIAL ENCOUNTER (HCC): ICD-10-CM

## 2024-08-05 DIAGNOSIS — Z96.651 H/O TOTAL KNEE REPLACEMENT, RIGHT: ICD-10-CM

## 2024-08-05 DIAGNOSIS — Z47.1 AFTERCARE FOLLOWING JOINT REPLACEMENT SURGERY, UNSPECIFIED JOINT: ICD-10-CM

## 2024-08-05 LAB
CRP SERPL HS-MCNC: <0.3 MG/DL (ref 0–0.75)
ERYTHROCYTE [SEDIMENTATION RATE] IN BLOOD BY WESTERGREN METHOD: 13 MM/HOUR (ref 0–25)

## 2024-08-05 PROCEDURE — 85652 RBC SED RATE AUTOMATED: CPT

## 2024-08-05 PROCEDURE — 36415 COLL VENOUS BLD VENIPUNCTURE: CPT

## 2024-08-05 PROCEDURE — 86140 C-REACTIVE PROTEIN: CPT

## 2024-08-13 ENCOUNTER — HOSPITAL ENCOUNTER (OUTPATIENT)
Dept: RADIOLOGY | Facility: MEDICAL CENTER | Age: 69
End: 2024-08-13
Attending: ORTHOPAEDIC SURGERY
Payer: MEDICARE

## 2024-08-13 ENCOUNTER — HOSPITAL ENCOUNTER (OUTPATIENT)
Dept: RADIOLOGY | Facility: MEDICAL CENTER | Age: 69
End: 2024-08-13

## 2024-08-13 DIAGNOSIS — M17.11 PRIMARY OSTEOARTHRITIS OF RIGHT KNEE: ICD-10-CM

## 2024-08-13 DIAGNOSIS — Z47.1 AFTERCARE FOLLOWING JOINT REPLACEMENT SURGERY, UNSPECIFIED JOINT: ICD-10-CM

## 2024-08-13 DIAGNOSIS — T84.022A INSTABILITY OF INTERNAL RIGHT KNEE PROSTHESIS, INITIAL ENCOUNTER (HCC): ICD-10-CM

## 2024-08-13 PROCEDURE — 78315 BONE IMAGING 3 PHASE: CPT

## 2024-08-15 ENCOUNTER — HOSPITAL ENCOUNTER (OUTPATIENT)
Dept: RADIOLOGY | Facility: MEDICAL CENTER | Age: 69
End: 2024-08-15
Attending: PHYSICIAN ASSISTANT
Payer: MEDICARE

## 2024-08-15 DIAGNOSIS — M54.16 LUMBAR RADICULOPATHY: ICD-10-CM

## 2024-08-15 PROCEDURE — 72148 MRI LUMBAR SPINE W/O DYE: CPT

## 2024-08-30 ENCOUNTER — APPOINTMENT (OUTPATIENT)
Dept: MEDICAL GROUP | Facility: PHYSICIAN GROUP | Age: 69
End: 2024-08-30
Payer: MEDICARE

## 2024-08-30 VITALS
HEART RATE: 79 BPM | SYSTOLIC BLOOD PRESSURE: 118 MMHG | HEIGHT: 63 IN | WEIGHT: 163.3 LBS | RESPIRATION RATE: 16 BRPM | TEMPERATURE: 97.5 F | DIASTOLIC BLOOD PRESSURE: 74 MMHG | BODY MASS INDEX: 28.93 KG/M2 | OXYGEN SATURATION: 96 %

## 2024-08-30 DIAGNOSIS — E78.2 MIXED HYPERLIPIDEMIA: ICD-10-CM

## 2024-08-30 DIAGNOSIS — G89.29 CHRONIC LOW BACK PAIN, UNSPECIFIED BACK PAIN LATERALITY, UNSPECIFIED WHETHER SCIATICA PRESENT: ICD-10-CM

## 2024-08-30 DIAGNOSIS — M54.50 CHRONIC LOW BACK PAIN, UNSPECIFIED BACK PAIN LATERALITY, UNSPECIFIED WHETHER SCIATICA PRESENT: ICD-10-CM

## 2024-08-30 RX ORDER — PREGABALIN 100 MG/1
100 CAPSULE ORAL 2 TIMES DAILY
Qty: 180 CAPSULE | Refills: 1 | Status: SHIPPED | OUTPATIENT
Start: 2024-08-30 | End: 2025-02-26

## 2024-08-30 RX ORDER — SIMVASTATIN 20 MG
20 TABLET ORAL EVERY EVENING
Qty: 90 TABLET | Refills: 1 | Status: SHIPPED | OUTPATIENT
Start: 2024-08-30

## 2024-08-30 RX ORDER — TRAMADOL HYDROCHLORIDE 50 MG/1
50 TABLET ORAL EVERY 8 HOURS PRN
Qty: 21 TABLET | Refills: 0 | Status: SHIPPED | OUTPATIENT
Start: 2024-08-30 | End: 2024-09-06

## 2024-08-30 ASSESSMENT — FIBROSIS 4 INDEX: FIB4 SCORE: 1.65

## 2024-08-30 NOTE — PROGRESS NOTES
Subjective:     CC:   Chief Complaint   Patient presents with    Follow-Up       HPI:   Shawn presents today for follow-up of her labs.  Patient is having left great toe pain.  Apparently patient is seeing Dr. Rios for her low back she is also seeing [facility] concerning her knee.  Patient is on Lyrica she is back to down to only 1 tablet twice a day and feels like this helps.  Patient is requesting more tramadol which she takes extremely sparingly.  Last prescription for tramadol was for 21 tablets I wrote it in mid  and is lasted her 3 months.  Patient does have a current controlled substance contract that she signed with me.    Past Medical History:   Diagnosis Date    Chronic low back pain     Hypertension     MVP (mitral valve prolapse)     Pericarditis        Social History     Tobacco Use    Smoking status: Former     Current packs/day: 0.00     Types: Cigarettes     Start date: 1980     Quit date: 1988     Years since quittin.2     Passive exposure: Past    Smokeless tobacco: Never   Vaping Use    Vaping status: Never Used   Substance Use Topics    Alcohol use: Yes     Alcohol/week: 0.0 oz     Comment: rare    Drug use: No       Current Outpatient Medications Ordered in Epic   Medication Sig Dispense Refill    traMADol (ULTRAM) 50 MG Tab Take 1 Tablet by mouth every 8 hours as needed for Severe Pain for up to 7 days. 21 Tablet 0    pregabalin (LYRICA) 100 MG Cap Take 1 Capsule by mouth 2 times a day for 180 days. 180 Capsule 1    simvastatin (ZOCOR) 20 MG Tab Take 1 Tablet by mouth every evening. 90 Tablet 1    omeprazole (PRILOSEC) 40 MG delayed-release capsule TAKE 1 CAPSULE BY MOUTH EVERY DAY 90 Capsule 1    meloxicam (MOBIC) 15 MG tablet TAKE 1 TABLET BY MOUTH EVERY DAY WITH FOOD. NO ADVIL/ALEVE/MOTRIN/IBUPROFEN ON THE SAME DAY. 90 Tablet 1    tretinoin (RETIN-A) 0.025 % cream APPLY PEA SIZED AMOUNT TOPICALLY TO FACE EVERY 3 NIGHTS. INCREASE TO EVERY NIGHT AS IRRITATION ALLOWS       No  "current [facility]-ordered facility-administered medications on file.       Allergies:  Patient has no known allergies.    Health Maintenance: Completed    ROS:  Gen: no fevers/chills, no changes in weight  Eyes: no changes in vision  ENT: no sore throat, no hearing loss, no bloody nose  Pulm: no sob, no cough  CV: no chest pain, no palpitations  GI: no nausea/vomiting, no diarrhea  : no dysuria  Neuro: no headaches, no numbness/tingling  Heme/Lymph: no easy bruising    Objective:     Exam:  /74 (BP Location: Left arm, Patient Position: Sitting, BP Cuff Size: Adult)   Pulse 79   Temp 36.4 °C (97.5 °F) (Temporal)   Resp 16   Ht 1.6 m (5' 3\")   Wt 74.1 kg (163 lb 4.8 oz)   SpO2 96%   BMI 28.93 kg/m²  Body mass index is 28.93 kg/m².    Gen: Alert and oriented, No apparent distress.  Skin: Warm and dry.  No obvious lesions.  Eyes: Sclera wnl Pupils normal in size  Lungs: Normal effort, CTA bilaterally, no wheezes, rhonchi, or rales  CV: Regular rate and rhythm. No murmurs, rubs, or gallops.  Musculoskeletal: Normal gait. No extremity cyanosis, clubbing, or edema.  Neuro: Oriented to person, place and time  Psych: Mood is wnl       Assessment & Plan:     68 y.o. female with the following -     1. Chronic low back pain, unspecified back pain laterality, unspecified whether sciatica present  Will continue to tramadol warring her that tramadol is not for continued use.  I also wrote lyrica refill for her Lyrica.  Patient will be following up with [facility] along with Dr. Rios.  - traMADol (ULTRAM) 50 MG Tab; Take 1 Tablet by mouth every 8 hours as needed for Severe Pain for up to 7 days.  Dispense: 21 Tablet; Refill: 0  - pregabalin (LYRICA) 100 MG Cap; Take 1 Capsule by mouth 2 times a day for 180 days.  Dispense: 180 Capsule; Refill: 1    2. Mixed hyperlipidemia  Patient's lipid panel is looking better we will continue the simvastatin  - simvastatin (ZOCOR) 20 MG Tab; Take 1 Tablet by mouth every evening.  Dispense: " 90 Tablet; Refill: 1       Return in about 3 months (around 11/30/2024), or if symptoms worsen or fail to improve.    Please note that this dictation was created using voice recognition software. I have made every reasonable attempt to correct obvious errors, but I expect that there are errors of grammar and possibly content that I did not discover before finalizing the note.

## 2024-09-03 PROBLEM — G56.01 CARPAL TUNNEL SYNDROME OF RIGHT WRIST: Status: ACTIVE | Noted: 2024-09-03

## 2024-09-16 ENCOUNTER — OFFICE VISIT (OUTPATIENT)
Dept: PHYSICAL MEDICINE AND REHAB | Facility: MEDICAL CENTER | Age: 69
End: 2024-09-16
Payer: MEDICARE

## 2024-09-16 VITALS
HEART RATE: 68 BPM | DIASTOLIC BLOOD PRESSURE: 70 MMHG | BODY MASS INDEX: 28.88 KG/M2 | TEMPERATURE: 98.6 F | WEIGHT: 163 LBS | HEIGHT: 63 IN | SYSTOLIC BLOOD PRESSURE: 110 MMHG | OXYGEN SATURATION: 96 %

## 2024-09-16 DIAGNOSIS — M54.16 LUMBAR RADICULOPATHY: ICD-10-CM

## 2024-09-16 DIAGNOSIS — M47.816 LUMBAR SPONDYLOSIS: ICD-10-CM

## 2024-09-16 DIAGNOSIS — Z96.651 CHRONIC KNEE PAIN AFTER TOTAL REPLACEMENT OF RIGHT KNEE JOINT: ICD-10-CM

## 2024-09-16 DIAGNOSIS — M53.3 SACROILIAC JOINT DYSFUNCTION OF RIGHT SIDE: ICD-10-CM

## 2024-09-16 DIAGNOSIS — M25.561 CHRONIC KNEE PAIN AFTER TOTAL REPLACEMENT OF RIGHT KNEE JOINT: ICD-10-CM

## 2024-09-16 DIAGNOSIS — G56.01 RIGHT CARPAL TUNNEL SYNDROME: ICD-10-CM

## 2024-09-16 DIAGNOSIS — G89.29 CHRONIC KNEE PAIN AFTER TOTAL REPLACEMENT OF RIGHT KNEE JOINT: ICD-10-CM

## 2024-09-16 PROCEDURE — G2211 COMPLEX E/M VISIT ADD ON: HCPCS | Performed by: PHYSICAL MEDICINE & REHABILITATION

## 2024-09-16 PROCEDURE — 1125F AMNT PAIN NOTED PAIN PRSNT: CPT | Performed by: PHYSICAL MEDICINE & REHABILITATION

## 2024-09-16 PROCEDURE — 3074F SYST BP LT 130 MM HG: CPT | Performed by: PHYSICAL MEDICINE & REHABILITATION

## 2024-09-16 PROCEDURE — 3078F DIAST BP <80 MM HG: CPT | Performed by: PHYSICAL MEDICINE & REHABILITATION

## 2024-09-16 PROCEDURE — 99214 OFFICE O/P EST MOD 30 MIN: CPT | Performed by: PHYSICAL MEDICINE & REHABILITATION

## 2024-09-16 ASSESSMENT — PAIN SCALES - GENERAL: PAINLEVEL: 6=MODERATE PAIN

## 2024-09-16 ASSESSMENT — FIBROSIS 4 INDEX: FIB4 SCORE: 1.65

## 2024-09-16 ASSESSMENT — PATIENT HEALTH QUESTIONNAIRE - PHQ9: CLINICAL INTERPRETATION OF PHQ2 SCORE: 0

## 2024-09-16 NOTE — PROGRESS NOTES
Follow up patient note  Interventional spine and sports physiatry, Physical medicine rehabilitation      Chief complaint:   Chief Complaint   Patient presents with    Follow-Up     Back pain          HISTORY    Please see new patient note by Dr Rios,  for more details.     HPI  Patient identification: Shawn Westbrook  1955,   female  With Diagnoses of Lumbar radiculopathy, Lumbar spondylosis, Sacroiliac joint dysfunction of right side, Right carpal tunnel syndrome, and Chronic knee pain after total replacement of right knee joint were pertinent to this visit.     Procedures:  2020 C7-T1 interlaminar epidural steroid injection with 100% improvement in pain and function in regards to the neck and arm pain after the injection follow-up visit on 2020 C7-T1 cervical interlaminar epidural steroid injection initially with 60% improvement but this only lasted for a few weeks  11/10/2022 right long digit trigger finger injection 100% relief of triggering  11/10/2022 right carpal tunnel injection 100% pain relief  1/10/2023 C7-T1 cervical interlaminar epidural steroid injection 100% pain relief  2024 C7-T1 left cervical interlaminar epidural steroid injection 90% pain relief postprocedure  2024 bilateral L5-S1 transforaminal epidural steroid injection 80% improvement in radiating pain.  4/15/2024 right carpal tunnel injection  2024 bilateral sacroiliac joint injection 70% improvement in pain in the sacroiliac joint.  Preprocedure pain 6/ 10 NRS postprocedure pain 1/10 NRS dislocation      History of Present Illness  The patient is a 68-year-old female here for follow-up.    She reports that her back pain has improved significantly, with the exception of persistent lower back pain. Initially, she believed this to be sciatica, but it was later identified as stemming from an old knee replacement. She continues to experience sciatic pain, which she attributes to her aging knee. An  MRI of her back led to a recommendation for double fusion surgery, which she declined. An alternative suggestion of ablation was made, which she found more acceptable. Her back pain intensifies at night, making lying down particularly uncomfortable. She also experiences pain radiating down her leg, predominantly on the right side, although she occasionally experiences sciatica on the left.  Her pain is overall in the right side low back which radiates down the right leg towards the knee 6 out of 10 in intensity constant she also has pain in the left side of the back which radiates towards the left leg which is less consistent but does still occur on a daily basis.  These are separate from the axial back pain.  She also has pain in the right knee.    She is going to have carpal tunnel surgery.        The patient has done a provider driven home exercise program including the past 6 months.     Medications tried include gabapentin which had to be stopped because of side effects.  Patient is on Lyrica now. She is also use meloxicam and Flexeril.           ROS Red Flags :   Fever, Chills, Sweats: Denies  Involuntary Weight Loss: Denies  Bowel/Bladder Incontinence: Denies  Saddle Anesthesia: Denies        PMHx:   Past Medical History:   Diagnosis Date    Chronic low back pain     Hypertension     MVP (mitral valve prolapse)     Pericarditis 2010       PSHx:   Past Surgical History:   Procedure Laterality Date    OR INJECTION,SACROILIAC JOINT Bilateral 5/14/2024    Procedure: RIGHT and Left sacroiliac joint injection with fluoroscopic guidance;  Surgeon: Marco Rios M.D.;  [facility];  Service: Pain Management    OR NJX AA&/STRD TFRML EPI LUMBAR/SACRAL 1 LEVEL Bilateral 2/13/2024    Procedure: BILATERAL L5-S1 versus L4-5 transforaminal epidural steroid injection with fluoroscopic guidance.    Note: cervical injection approximately 1 month prior to the lumbar;  Surgeon: Marco Rios  M.D.;  [facility];  Service: Pain Management    AZ INJ CERV/THORAC,W/ IMAGING Left 1/16/2024    Procedure: LEFT cervical C7-T1 interlaminar epidural steroid injection.    Note: cervical approximately 1 month prior to the lumbar;  Surgeon: Marco Rios M.D.;  [facility];  Service: Pain Management    AZ INJ CERV/THORAC,W/ IMAGING N/A 1/10/2023    Procedure: C7-T1 interlaminar epidural steroid injection;  Surgeon: Marco Rios M.D.;  [facility];  Service: Pain Management    AZ INJ CERV/THORAC,W/ IMAGING N/A 9/27/2022    Procedure: C7-T1 interlaminar epidural steroid injection;  Surgeon: Marco Rios M.D.;  [facility];  Service: Pain Management    ABDOMINAL HYSTERECTOMY TOTAL      cervix remains and one ovary.     ARTHROPLASTY Bilateral     Total knee replacements    LAMINOTOMY      L4-5, L5-S1 fusion    OTHER ORTHOPEDIC SURGERY      spinal fusion    OTHER ORTHOPEDIC SURGERY      right knee replacement       Family history   Family History   Problem Relation Age of Onset    Heart Disease Mother     Heart Disease Father     Cancer Father     Alcohol abuse Father     Diabetes Sister     Diabetes Sister     Breast Cancer Maternal Grandmother     Dementia Paternal Grandmother          Medications:   Outpatient Medications Marked as Taking for the 9/16/24 encounter (Office Visit) with Marco Rios M.D.   Medication Sig Dispense Refill    pregabalin (LYRICA) 300 MG capsule Take 1 Capsule by mouth at bedtime for 90 days. Indications: Neuropathic Pain 30 Capsule 2    pregabalin (LYRICA) 100 MG Cap Take 1 Capsule by mouth 2 times a day for 180 days. 180 Capsule 1    simvastatin (ZOCOR) 20 MG Tab Take 1 Tablet by mouth every evening. 90 Tablet 1    omeprazole (PRILOSEC) 40 MG delayed-release capsule TAKE 1 CAPSULE BY MOUTH EVERY DAY 90 Capsule 1    meloxicam (MOBIC) 15 MG tablet TAKE 1 TABLET BY  MOUTH EVERY DAY WITH FOOD. NO ADVIL/ALEVE/MOTRIN/IBUPROFEN ON THE SAME DAY. 90 Tablet 1    tretinoin (RETIN-A) 0.025 % cream APPLY PEA SIZED AMOUNT TOPICALLY TO FACE EVERY 3 NIGHTS. INCREASE TO EVERY NIGHT AS IRRITATION ALLOWS          Current Outpatient Medications on File Prior to Visit   Medication Sig Dispense Refill    pregabalin (LYRICA) 300 MG capsule Take 1 Capsule by mouth at bedtime for 90 days. Indications: Neuropathic Pain 30 Capsule 2    pregabalin (LYRICA) 100 MG Cap Take 1 Capsule by mouth 2 times a day for 180 days. 180 Capsule 1    simvastatin (ZOCOR) 20 MG Tab Take 1 Tablet by mouth every evening. 90 Tablet 1    omeprazole (PRILOSEC) 40 MG delayed-release capsule TAKE 1 CAPSULE BY MOUTH EVERY DAY 90 Capsule 1    meloxicam (MOBIC) 15 MG tablet TAKE 1 TABLET BY MOUTH EVERY DAY WITH FOOD. NO ADVIL/ALEVE/MOTRIN/IBUPROFEN ON THE SAME DAY. 90 Tablet 1    tretinoin (RETIN-A) 0.025 % cream APPLY PEA SIZED AMOUNT TOPICALLY TO FACE EVERY 3 NIGHTS. INCREASE TO EVERY NIGHT AS IRRITATION ALLOWS       No current facility-administered medications on file prior to visit.         Allergies:   No Known Allergies      Social Hx:   Social History     Socioeconomic History    Marital status:      Spouse name: Not on file    Number of children: Not on file    Years of education: Not on file    Highest education level: Not on file   Occupational History     Employer: OTHER     Comment: retired   Tobacco Use    Smoking status: Former     Current packs/day: 0.00     Types: Cigarettes     Start date: 1980     Quit date: 1988     Years since quittin.3     Passive exposure: Past    Smokeless tobacco: Never   Vaping Use    Vaping status: Never Used   Substance and Sexual Activity    Alcohol use: Yes     Alcohol/week: 0.0 oz     Comment: rare    Drug use: No    Sexual activity: Not Currently     Comment:    Other Topics Concern     Service No    Blood Transfusions No    Caffeine Concern No  "   Occupational Exposure No    Hobby Hazards No    Sleep Concern Yes     Comment: due to pain    Stress Concern Yes    Weight Concern No    Special Diet No    Back Care No    Exercise Yes    Bike Helmet No    Seat Belt Yes    Self-Exams Yes   Social History Narrative    Not on file     Social Determinants of Health     Financial Resource Strain: Not on file   Food Insecurity: Not on file   Transportation Needs: Not on file   Physical Activity: Not on file   Stress: Not on file   Social Connections: Not on file   Intimate Partner Violence: Not on file   Housing Stability: Not on file         EXAMINATION     Physical Exam:   Vitals: /70 (BP Location: Right arm, Patient Position: Sitting, BP Cuff Size: Adult)   Pulse 68   Temp 37 °C (98.6 °F) (Temporal)   Ht 1.6 m (5' 3\")   Wt 73.9 kg (163 lb)   SpO2 96%     Constitutional:   Body Habitus: Body mass index is 28.87 kg/m².  Cooperation: Fully cooperates with exam  Appearance: Well-groomed no disheveled    Respiratory-  breathing comfortable on room air, no audible wheezing  Cardiovascular- capillary refills less than 2 seconds. No lower extremity edema is noted.   Psychiatric- alert and oriented ×3. Normal affect.    MSK and Neuro: -    Thoracic/Lumbar Spine/Sacral Spine/Hips     decreased active range of motion with flexion, lateral flexion, and rotation bilaterally.   There is decreased active range of motion with lumbar extension.    There is  pain with lumbar extension.   There is pain with facet loading maneuver (extension rotation) with axial low back pain on the BILATERAL side(s)       Palpation:   No tenderness to palpation in midline at T1-T12 levels. No tenderness to palpation in the left and right of the midline T1-L5, NEGATIVE for tenderness to palpation to the para-midline region in the lower lumbar levels.  palpation over SI joint: negative bilaterally    palpation in hip or over the gluteus medius tendon insertion: negative bilaterally  " "    Lumbar spine Special tests  Neuro tension  Straight leg test positive bilaterally    Slump test positive bilaterally      Key points for the international standards for neurological classification of spinal cord injury (ISNCSCI) to light touch.     Dermatome R L                                      L2 2 2   L3 1 1   L4 2 2   L5 2 2   S1 2 2   S2 2 2         Motor Exam Lower Extremities    ? Myotome R L   Hip flexion L2 5 5   Knee extension L3 5 5   Ankle dorsiflexion L4 5 5   Toe extension L5 5- 5-   Ankle plantarflexion S1 5 5         MEDICAL DECISION MAKING    DATA    Labs:   Lab Results   Component Value Date/Time    SODIUM 140 07/05/2024 06:58 AM    POTASSIUM 3.9 07/05/2024 06:58 AM    CHLORIDE 104 07/05/2024 06:58 AM    CO2 24 07/05/2024 06:58 AM    GLUCOSE 94 07/05/2024 06:58 AM    BUN 20 07/05/2024 06:58 AM    CREATININE 0.66 07/05/2024 06:58 AM        No results found for: \"PROTHROMBTM\", \"INR\"     Lab Results   Component Value Date/Time    WBC 5.7 07/05/2024 06:58 AM    RBC 4.49 07/05/2024 06:58 AM    HEMOGLOBIN 13.7 07/05/2024 06:58 AM    HEMATOCRIT 42.3 07/05/2024 06:58 AM    MCV 94.2 07/05/2024 06:58 AM    MCH 30.5 07/05/2024 06:58 AM    MCHC 32.4 07/05/2024 06:58 AM    MPV 12.7 07/05/2024 06:58 AM    NEUTSPOLYS 61.20 07/05/2024 06:58 AM    LYMPHOCYTES 27.00 07/05/2024 06:58 AM    MONOCYTES 8.40 07/05/2024 06:58 AM    EOSINOPHILS 2.10 07/05/2024 06:58 AM    BASOPHILS 1.10 07/05/2024 06:58 AM        Lab Results   Component Value Date/Time    HBA1C 5.6 06/25/2014 11:27 AM          Imaging:   I personally reviewed following images    MRI cervical spine 6/ 17/2020  There is mild to moderate central canal stenosis at C5-6 with severe left and moderate right neuroforaminal stenosis.  There is mild central canal stenosis at C6-7 with moderate bilateral neuroforaminal stenosis.  There is facet arthropathy right at C2-3.  Also facet arthropathy right worse than left at C3-4, C4-5, C5-6.    4/15/2024 XR " hip  Mild degenerative changes in the hip.    I reviewed the following radiology reports                                 Xr HIP 4/15/2024  IMPRESSION:   1.  No RIGHT hip or pelvic fracture.  2.  Mild degenerative change of both hips.  3.  Degenerative and postoperative changes of lower lumbar spine.    MRI lumbar spine 5/3/2022        Results for orders placed during the hospital encounter of 20   MR-CERVICAL SPINE-W/O    Impression 1.  Mild spinal canal narrowing at C5-6 and C6-7    2.  Foraminal stenoses at C5-6 and C6-7    3.  Multilevel disc bulge, endplate spurring, and facet arthropathy                     Results for orders placed during the hospital encounter of 20   DX-CERVICAL SPINE-WITH FLEX-EXT   7+    Impression 1.  No compression deformity or acute fracture.    2.  There is degenerative disc disease and facet arthropathy with bilateral osseous neural foraminal narrowing.    3.  No focal instability is noted on flexion extension views.                            ELECTRODIAGNOSTICS  EMG/nerve conduction study performed on  22  This is an abnormal study.  There is electrophysiologic evidence of mild right median neuropathy at the wrist (carpal tunnel syndrome).  There is no evidence of right cervical (C5-T1) radiculopathy.  Clinical correlation is recommended.                                                               DIAGNOSIS   Visit Diagnoses     ICD-10-CM   1. Lumbar radiculopathy  M54.16   2. Lumbar spondylosis  M47.816   3. Sacroiliac joint dysfunction of right side  M53.3   4. Right carpal tunnel syndrome  G56.01   5. Chronic knee pain after total replacement of right knee joint  M25.561    G89.29    Z96.651         ASSESSMENT and PLAN:     Shawn Westbrook  1955,   female      Shawn was seen today for follow-up.    Diagnoses and all orders for this visit:    Lumbar radiculopathy  -     Referral to Physical Medicine Rehab    Lumbar spondylosis    Sacroiliac joint  dysfunction of right side    Right carpal tunnel syndrome    Chronic knee pain after total replacement of right knee joint      Assessment & Plan  Her low back pain is secondary to multifactorial causes including lumbar radiculopathy, spinal stenosis, lumbar spondylosis.  We discussed treatment options for her.  I also reviewed notes from spine surgeon Dr. Gonzalez.    We will proceed first with bilateral L3-4 transforaminal epidural steroid injection    The risks benefits and alternatives to this procedure were discussed and the patient wishes to proceed with the procedure. Risks include but are not limited to damage to surrounding structures, infection, bleeding, worsening of pain which can be permanent, weakness which can be permanent. Benefits include pain relief, improved function. Alternatives includes not doing the procedure.      If substantial pain axial back pain persists, medial branch blocks will be considered subsequently.      The patient has been experiencing knee pain due to an old knee replacement. Options such as genicular nerve blocks and ablation for the knee pain were discussed.    The patient is awaiting confirmation for carpal tunnel surgery, which may occur either next Tuesday or on October 8, 2024. If the surgery is scheduled for next week, the epidural injection will be postponed until she is cleared. If the surgery is not scheduled, the injection can proceed next week.    Medications: Continue meloxicam and Lyrica      Follow up: After the above procedure for diagnostic and therapeutic purposes    Thank you for allowing me to participate in the care of this patient. If you have any questions please not hesitate to contact me.             Please note that this dictation was created using voice recognition software. I have made every reasonable attempt to correct obvious errors but there may be errors of grammar and content that I may have overlooked prior to finalization of this  note.      Marco Rios MD  Interventional Spine and Sports Physiatry  Physical Medicine and Rehabilitation  [facility]

## 2024-10-02 ENCOUNTER — HOSPITAL ENCOUNTER (OUTPATIENT)
Facility: REHABILITATION | Age: 69
End: 2024-10-02
Attending: PHYSICAL MEDICINE & REHABILITATION | Admitting: PHYSICAL MEDICINE & REHABILITATION
Payer: MEDICARE

## 2024-10-02 ENCOUNTER — APPOINTMENT (OUTPATIENT)
Dept: RADIOLOGY | Facility: REHABILITATION | Age: 69
End: 2024-10-02
Attending: PHYSICAL MEDICINE & REHABILITATION
Payer: MEDICARE

## 2024-10-02 VITALS
RESPIRATION RATE: 16 BRPM | SYSTOLIC BLOOD PRESSURE: 127 MMHG | HEIGHT: 63 IN | DIASTOLIC BLOOD PRESSURE: 81 MMHG | WEIGHT: 163.8 LBS | OXYGEN SATURATION: 97 % | TEMPERATURE: 97.8 F | BODY MASS INDEX: 29.02 KG/M2 | HEART RATE: 63 BPM

## 2024-10-02 PROCEDURE — 64483 NJX AA&/STRD TFRM EPI L/S 1: CPT

## 2024-10-02 PROCEDURE — 700111 HCHG RX REV CODE 636 W/ 250 OVERRIDE (IP): Mod: JZ

## 2024-10-02 PROCEDURE — 700117 HCHG RX CONTRAST REV CODE 255

## 2024-10-02 RX ORDER — LIDOCAINE HYDROCHLORIDE 10 MG/ML
INJECTION, SOLUTION EPIDURAL; INFILTRATION; INTRACAUDAL; PERINEURAL
Status: COMPLETED
Start: 2024-10-02 | End: 2024-10-02

## 2024-10-02 RX ORDER — DEXAMETHASONE SODIUM PHOSPHATE 10 MG/ML
INJECTION, SOLUTION INTRAMUSCULAR; INTRAVENOUS
Status: COMPLETED
Start: 2024-10-02 | End: 2024-10-02

## 2024-10-02 RX ADMIN — LIDOCAINE HYDROCHLORIDE 10 ML: 10 INJECTION, SOLUTION EPIDURAL; INFILTRATION; INTRACAUDAL; PERINEURAL at 10:15

## 2024-10-02 RX ADMIN — IOHEXOL 5 ML: 240 INJECTION, SOLUTION INTRATHECAL; INTRAVASCULAR; INTRAVENOUS; ORAL at 10:15

## 2024-10-02 RX ADMIN — DEXAMETHASONE SODIUM PHOSPHATE 10 MG: 10 INJECTION, SOLUTION INTRAMUSCULAR; INTRAVENOUS at 10:16

## 2024-10-02 ASSESSMENT — PAIN DESCRIPTION - PAIN TYPE: TYPE: CHRONIC PAIN

## 2024-10-02 ASSESSMENT — FIBROSIS 4 INDEX: FIB4 SCORE: 1.65

## 2024-10-03 ENCOUNTER — TELEPHONE (OUTPATIENT)
Dept: PHYSICAL MEDICINE AND REHAB | Facility: MEDICAL CENTER | Age: 69
End: 2024-10-03
Payer: MEDICARE

## 2024-11-04 ENCOUNTER — HOSPITAL ENCOUNTER (OUTPATIENT)
Dept: RADIOLOGY | Facility: MEDICAL CENTER | Age: 69
End: 2024-11-04

## 2024-11-04 ENCOUNTER — APPOINTMENT (OUTPATIENT)
Dept: PHYSICAL MEDICINE AND REHAB | Facility: MEDICAL CENTER | Age: 69
End: 2024-11-04
Payer: MEDICARE

## 2024-11-25 RX ORDER — MELOXICAM 15 MG/1
TABLET ORAL
Qty: 90 TABLET | Refills: 1 | Status: SHIPPED | OUTPATIENT
Start: 2024-11-25

## 2024-11-25 RX ORDER — OMEPRAZOLE 40 MG/1
40 CAPSULE, DELAYED RELEASE ORAL DAILY
Qty: 90 CAPSULE | Refills: 1 | Status: SHIPPED | OUTPATIENT
Start: 2024-11-25

## 2024-12-05 ENCOUNTER — APPOINTMENT (OUTPATIENT)
Dept: PHYSICAL MEDICINE AND REHAB | Facility: MEDICAL CENTER | Age: 69
End: 2024-12-05
Payer: MEDICARE

## 2024-12-05 VITALS
WEIGHT: 156.8 LBS | SYSTOLIC BLOOD PRESSURE: 119 MMHG | HEART RATE: 66 BPM | HEIGHT: 63 IN | BODY MASS INDEX: 27.78 KG/M2 | OXYGEN SATURATION: 96 % | TEMPERATURE: 97 F | DIASTOLIC BLOOD PRESSURE: 68 MMHG

## 2024-12-05 DIAGNOSIS — M54.12 CERVICAL RADICULOPATHY: ICD-10-CM

## 2024-12-05 DIAGNOSIS — M47.816 LUMBAR SPONDYLOSIS: ICD-10-CM

## 2024-12-05 DIAGNOSIS — M48.02 CERVICAL SPINAL STENOSIS: ICD-10-CM

## 2024-12-05 DIAGNOSIS — M47.812 CERVICAL SPONDYLOSIS: ICD-10-CM

## 2024-12-05 DIAGNOSIS — M54.16 LUMBAR RADICULOPATHY: ICD-10-CM

## 2024-12-05 DIAGNOSIS — M53.3 SACROILIAC JOINT DYSFUNCTION OF RIGHT SIDE: ICD-10-CM

## 2024-12-05 PROCEDURE — 99214 OFFICE O/P EST MOD 30 MIN: CPT | Performed by: PHYSICAL MEDICINE & REHABILITATION

## 2024-12-05 PROCEDURE — 3074F SYST BP LT 130 MM HG: CPT | Performed by: PHYSICAL MEDICINE & REHABILITATION

## 2024-12-05 PROCEDURE — 3078F DIAST BP <80 MM HG: CPT | Performed by: PHYSICAL MEDICINE & REHABILITATION

## 2024-12-05 PROCEDURE — 1125F AMNT PAIN NOTED PAIN PRSNT: CPT | Performed by: PHYSICAL MEDICINE & REHABILITATION

## 2024-12-05 ASSESSMENT — FIBROSIS 4 INDEX: FIB4 SCORE: 1.65

## 2024-12-05 ASSESSMENT — PAIN SCALES - GENERAL: PAINLEVEL_OUTOF10: 6=MODERATE PAIN

## 2024-12-05 ASSESSMENT — PATIENT HEALTH QUESTIONNAIRE - PHQ9: CLINICAL INTERPRETATION OF PHQ2 SCORE: 0

## 2024-12-05 NOTE — PROGRESS NOTES
Follow up patient note  Interventional spine and sports physiatry, Physical medicine rehabilitation      Chief complaint:   Chief Complaint   Patient presents with    Follow-Up     Back pain          HISTORY    Please see new patient note by Dr Rios,  for more details.     HPI  Patient identification: Shawn Westbrook  1955,   female  With Diagnoses of Lumbar spondylosis, Lumbar radiculopathy, stable, improved after epidural steroid injection, Sacroiliac joint dysfunction of right side, Cervical spondylosis, Cervical radiculopathy, and Cervical spinal stenosis were pertinent to this visit.     Procedures:  2020 C7-T1 interlaminar epidural steroid injection with 100% improvement in pain and function in regards to the neck and arm pain after the injection follow-up visit on 2020 C7-T1 cervical interlaminar epidural steroid injection initially with 60% improvement but this only lasted for a few weeks  11/10/2022 right long digit trigger finger injection 100% relief of triggering  11/10/2022 right carpal tunnel injection 100% pain relief  1/10/2023 C7-T1 cervical interlaminar epidural steroid injection 100% pain relief  2024 C7-T1 left cervical interlaminar epidural steroid injection 90% pain relief postprocedure  2024 bilateral L5-S1 transforaminal epidural steroid injection 80% improvement in radiating pain.  4/15/2024 right carpal tunnel injection  2024 bilateral sacroiliac joint injection 70% improvement in pain in the sacroiliac joint.  Preprocedure pain 6/ 10 NRS postprocedure pain 1/10 NRS dislocation  10/2/2024 bilateral L3-4 transforaminal epidural steroid injection 100% improvement in the radicular component of her pain.      History of Present Illness  The patient is a 68-year-old female here for follow-up.    After the epidural steroid injection the patient had 100% improvement in the radicular component of her pain.  She continues to have bilateral axial low  back pain which is aching quality nonradiating worse with lumbar extension worse with standing walking especially worse with bed mobility causing difficulty with ADLs this is chronic pain present for greater than 6 months overall.  This is 6 out of 10 in intensity constant.  She denies radiating pain denies leg pain.  She does have numbness and tingling secondary to peripheral neuropathy in her feet.  She noted that with the improvement in pain in her back her foot numbness and tingling is more prominent.        The patient has done a provider driven home exercise program including the past 6 months.     Medications tried include gabapentin which had to be stopped because of side effects.  Patient is on Lyrica now. She is also use meloxicam and Flexeril.           ROS Red Flags :   Fever, Chills, Sweats: Denies  Involuntary Weight Loss: Denies  Bowel/Bladder Incontinence: Denies  Saddle Anesthesia: Denies        PMHx:   Past Medical History:   Diagnosis Date    Chronic low back pain     Hypertension     MVP (mitral valve prolapse)     Pericarditis 2010       PSHx:   Past Surgical History:   Procedure Laterality Date    NJ NJX AA&/STRD TFRML EPI LUMBAR/SACRAL 1 LEVEL Bilateral 10/2/2024    Procedure: BILATERAL L3-4 transforaminal epidural steroid injection with fluoroscopic guidance;  Surgeon: Marco Rios M.D.;  [facility];  Service: Pain Management    NJ INJECTION,SACROILIAC JOINT Bilateral 5/14/2024    Procedure: RIGHT and Left sacroiliac joint injection with fluoroscopic guidance;  Surgeon: Marco Rios M.D.;  Location: [facility];  Service: Pain Management    NJ NJX AA&/STRD TFRML EPI LUMBAR/SACRAL 1 LEVEL Bilateral 2/13/2024    Procedure: BILATERAL L5-S1 versus L4-5 transforaminal epidural steroid injection with fluoroscopic guidance.    Note: cervical injection approximately 1 month prior to the lumbar;  Surgeon: Marco Rios M.D.;  [facility];  Service: Pain Management    MT INJ CERV/THORAC,W/ IMAGING Left 1/16/2024    Procedure: LEFT cervical C7-T1 interlaminar epidural steroid injection.    Note: cervical approximately 1 month prior to the lumbar;  Surgeon: Marco Rios M.D.;  [facility];  Service: Pain Management    MT INJ CERV/THORAC,W/ IMAGING N/A 1/10/2023    Procedure: C7-T1 interlaminar epidural steroid injection;  Surgeon: Marco Rios M.D.;  [facility];  Service: Pain Management    MT INJ CERV/THORAC,W/ IMAGING N/A 9/27/2022    Procedure: C7-T1 interlaminar epidural steroid injection;  Surgeon: Marco Rios M.D.;  [facility]: [facility];  Service: Pain Management    ABDOMINAL HYSTERECTOMY TOTAL      cervix remains and one ovary.     ARTHROPLASTY Bilateral     Total knee replacements    LAMINOTOMY      L4-5, L5-S1 fusion    OTHER ORTHOPEDIC SURGERY      spinal fusion    OTHER ORTHOPEDIC SURGERY      right knee replacement       Family history   Family History   Problem Relation Age of Onset    Heart Disease Mother     Heart Disease Father     Cancer Father     Alcohol abuse Father     Diabetes Sister     Diabetes Sister     Breast Cancer Maternal Grandmother     Dementia Paternal Grandmother          Medications:   Outpatient Medications Marked as Taking for the 12/5/24 encounter (Office Visit) with Marco Rios M.D.   Medication Sig Dispense Refill    omeprazole (PRILOSEC) 40 MG delayed-release capsule TAKE 1 CAPSULE BY MOUTH EVERY DAY 90 Capsule 1    meloxicam (MOBIC) 15 MG tablet TAKE 1 TABLET BY MOUTH EVERY DAY WITH FOOD. NO ADVIL/ALEVE/MOTRIN/IBUPROFEN ON THE SAME DAY. 90 Tablet 1    Melatonin 1 MG Cap Take  by mouth.      pregabalin (LYRICA) 100 MG Cap Take 1 Capsule by mouth 2 times a day for 180 days. 180 Capsule 1    simvastatin (ZOCOR) 20 MG Tab Take 1 Tablet by mouth every evening. 90 Tablet 1    tretinoin (RETIN-A) 0.025 %  cream APPLY PEA SIZED AMOUNT TOPICALLY TO FACE EVERY 3 NIGHTS. INCREASE TO EVERY NIGHT AS IRRITATION ALLOWS          Current Outpatient Medications on File Prior to Visit   Medication Sig Dispense Refill    omeprazole (PRILOSEC) 40 MG delayed-release capsule TAKE 1 CAPSULE BY MOUTH EVERY DAY 90 Capsule 1    meloxicam (MOBIC) 15 MG tablet TAKE 1 TABLET BY MOUTH EVERY DAY WITH FOOD. NO ADVIL/ALEVE/MOTRIN/IBUPROFEN ON THE SAME DAY. 90 Tablet 1    Melatonin 1 MG Cap Take  by mouth.      pregabalin (LYRICA) 100 MG Cap Take 1 Capsule by mouth 2 times a day for 180 days. 180 Capsule 1    simvastatin (ZOCOR) 20 MG Tab Take 1 Tablet by mouth every evening. 90 Tablet 1    tretinoin (RETIN-A) 0.025 % cream APPLY PEA SIZED AMOUNT TOPICALLY TO FACE EVERY 3 NIGHTS. INCREASE TO EVERY NIGHT AS IRRITATION ALLOWS       No current facility-administered medications on file prior to visit.         Allergies:   No Known Allergies      Social Hx:   Social History     Socioeconomic History    Marital status:      Spouse name: Not on file    Number of children: Not on file    Years of education: Not on file    Highest education level: Not on file   Occupational History     Employer: OTHER     Comment: retired   Tobacco Use    Smoking status: Former     Current packs/day: 0.00     Types: Cigarettes     Start date: 1980     Quit date: 1988     Years since quittin.5     Passive exposure: Past    Smokeless tobacco: Never   Vaping Use    Vaping status: Never Used   Substance and Sexual Activity    Alcohol use: Yes     Alcohol/week: 0.0 oz     Comment: rare    Drug use: No    Sexual activity: Not Currently     Comment:    Other Topics Concern     Service No    Blood Transfusions No    Caffeine Concern No    Occupational Exposure No    Hobby Hazards No    Sleep Concern Yes     Comment: due to pain    Stress Concern Yes    Weight Concern No    Special Diet No    Back Care No    Exercise Yes    Bike Helmet No     "Seat Belt Yes    Self-Exams Yes   Social History Narrative    Not on file     Social Drivers of Health     Financial Resource Strain: Not on file   Food Insecurity: Not on file   Transportation Needs: Not on file   Physical Activity: Not on file   Stress: Not on file   Social Connections: Not on file   Intimate Partner Violence: Not on file   Housing Stability: Not on file         EXAMINATION     Physical Exam:   Vitals: /68 (BP Location: Right arm, Patient Position: Sitting, BP Cuff Size: Adult)   Pulse 66   Temp 36.1 °C (97 °F) (Temporal)   Ht 1.6 m (5' 3\")   Wt 71.1 kg (156 lb 12.8 oz)   SpO2 96%     Constitutional:   Body Habitus: Body mass index is 27.78 kg/m².  Cooperation: Fully cooperates with exam  Appearance: Well-groomed no disheveled    Respiratory-  breathing comfortable on room air, no audible wheezing  Cardiovascular- capillary refills less than 2 seconds. No lower extremity edema is noted.   Psychiatric- alert and oriented ×3. Normal affect.    MSK and Neuro: -    Physical Exam        Thoracic/Lumbar Spine/Sacral Spine/Hips   There are no signs of infection around the injection sites.   decreased active range of motion with flexion, lateral flexion, and rotation bilaterally.   There is decreased active range of motion with lumbar extension.    There is  pain with lumbar extension.   There is pain with facet loading maneuver (extension rotation) with axial low back pain on the BILATERAL side(s) L2-3 and L3-4      Palpation:   No tenderness to palpation in midline at T1-T12 levels. No tenderness to palpation in the left and right of the midline T1-L5, NEGATIVE for tenderness to palpation to the para-midline region in the lower lumbar levels.  palpation over SI joint: negative bilaterally    palpation in hip or over the gluteus medius tendon insertion: negative bilaterally      Lumbar spine Special tests  Neuro tension  Straight leg test negative bilaterally    Slump test negative bilaterally " "     Key points for the international standards for neurological classification of spinal cord injury (ISNCSCI) to light touch.     Dermatome R L                                      L2 2 2   L3 2 2   L4 1 1   L5 1 1   S1 1 1   S2 2 2     Numbness consistent with peripheral polyneuropathy.    Motor Exam Lower Extremities    ? Myotome R L   Hip flexion L2 5 5   Knee extension L3 5 5   Ankle dorsiflexion L4 5 5   Toe extension L5 5 5   Ankle plantarflexion S1 5 5             MEDICAL DECISION MAKING    DATA    Labs:   Lab Results   Component Value Date/Time    SODIUM 140 07/05/2024 06:58 AM    POTASSIUM 3.9 07/05/2024 06:58 AM    CHLORIDE 104 07/05/2024 06:58 AM    CO2 24 07/05/2024 06:58 AM    GLUCOSE 94 07/05/2024 06:58 AM    BUN 20 07/05/2024 06:58 AM    CREATININE 0.66 07/05/2024 06:58 AM        No results found for: \"PROTHROMBTM\", \"INR\"     Lab Results   Component Value Date/Time    WBC 5.7 07/05/2024 06:58 AM    RBC 4.49 07/05/2024 06:58 AM    HEMOGLOBIN 13.7 07/05/2024 06:58 AM    HEMATOCRIT 42.3 07/05/2024 06:58 AM    MCV 94.2 07/05/2024 06:58 AM    MCH 30.5 07/05/2024 06:58 AM    MCHC 32.4 07/05/2024 06:58 AM    MPV 12.7 07/05/2024 06:58 AM    NEUTSPOLYS 61.20 07/05/2024 06:58 AM    LYMPHOCYTES 27.00 07/05/2024 06:58 AM    MONOCYTES 8.40 07/05/2024 06:58 AM    EOSINOPHILS 2.10 07/05/2024 06:58 AM    BASOPHILS 1.10 07/05/2024 06:58 AM        Lab Results   Component Value Date/Time    HBA1C 5.6 06/25/2014 11:27 AM          Imaging:   I personally reviewed following images    MRI cervical spine 6/ 17/2020  There is mild to moderate central canal stenosis at C5-6 with severe left and moderate right neuroforaminal stenosis.  There is mild central canal stenosis at C6-7 with moderate bilateral neuroforaminal stenosis.  There is facet arthropathy right at C2-3.  Also facet arthropathy right worse than left at C3-4, C4-5, C5-6.    4/15/2024 XR hip  Mild degenerative changes in the hip.    I reviewed the following " radiology reports                                 Xr HIP 4/15/2024  IMPRESSION:   1.  No RIGHT hip or pelvic fracture.  2.  Mild degenerative change of both hips.  3.  Degenerative and postoperative changes of lower lumbar spine.    MRI lumbar spine 5/3/2022        Results for orders placed during the hospital encounter of 20   MR-CERVICAL SPINE-W/O    Impression 1.  Mild spinal canal narrowing at C5-6 and C6-7    2.  Foraminal stenoses at C5-6 and C6-7    3.  Multilevel disc bulge, endplate spurring, and facet arthropathy                     Results for orders placed during the hospital encounter of 20   DX-CERVICAL SPINE-WITH FLEX-EXT   7+    Impression 1.  No compression deformity or acute fracture.    2.  There is degenerative disc disease and facet arthropathy with bilateral osseous neural foraminal narrowing.    3.  No focal instability is noted on flexion extension views.                            ELECTRODIAGNOSTICS  EMG/nerve conduction study performed on  22  This is an abnormal study.  There is electrophysiologic evidence of mild right median neuropathy at the wrist (carpal tunnel syndrome).  There is no evidence of right cervical (C5-T1) radiculopathy.  Clinical correlation is recommended.                                                               DIAGNOSIS   Visit Diagnoses     ICD-10-CM   1. Lumbar spondylosis  M47.816   2. Lumbar radiculopathy, stable, improved after epidural steroid injection  M54.16   3. Sacroiliac joint dysfunction of right side  M53.3   4. Cervical spondylosis  M47.812   5. Cervical radiculopathy  M54.12   6. Cervical spinal stenosis  M48.02         ASSESSMENT and PLAN:     Shawn Westbrook  1955,   female      Shawn was seen today for follow-up.    Diagnoses and all orders for this visit:    Lumbar spondylosis  -     Referral to Physical Medicine Rehab  -     Referral to Physical Medicine Rehab    Lumbar radiculopathy, stable, improved after epidural  steroid injection    Sacroiliac joint dysfunction of right side    Cervical spondylosis    Cervical radiculopathy    Cervical spinal stenosis      Assessment & Plan       The patient is failed conservative treatments with medication management, home exercise program from the direction of physical therapy/physician including the past 6 months.  I have ordered a BILATERAL diagnostic medial branch block targeting the L2-3 and L3-4 facet joints #1 and #2.  Procedure #2 is only to be done if #1 is a positive block.    The risks benefits and alternatives to this procedure were discussed and the patient wishes to proceed with the procedure. Risks include but are not limited to damage to surrounding structures, infection, bleeding, worsening of pain which can be permanent, weakness which can be permanent. Benefits include pain relief, improved function. Alternatives includes not doing the procedure.   If there are 2 positive blocks I would consider the patient for radiofrequency neurotomy of the previously blocked nerves.    Medications: Continue meloxicam.  Continue Lyrica.  I recommend for the patient's PCP Princess Mena M.D. to consider Cymbalta which could be helpful for pain and mood.      Follow up: After the above diagnostic procedures    Thank you for allowing me to participate in the care of this patient. If you have any questions please not hesitate to contact me.             Please note that this dictation was created using voice recognition software. I have made every reasonable attempt to correct obvious errors but there may be errors of grammar and content that I may have overlooked prior to finalization of this note.      Marco Rios MD  Interventional Spine and Sports Physiatry  Physical Medicine and Rehabilitation  [facility]

## 2024-12-06 ENCOUNTER — OFFICE VISIT (OUTPATIENT)
Dept: MEDICAL GROUP | Facility: PHYSICIAN GROUP | Age: 69
End: 2024-12-06
Payer: MEDICARE

## 2024-12-06 VITALS
HEIGHT: 63 IN | RESPIRATION RATE: 16 BRPM | SYSTOLIC BLOOD PRESSURE: 98 MMHG | OXYGEN SATURATION: 96 % | TEMPERATURE: 97.7 F | WEIGHT: 157.4 LBS | BODY MASS INDEX: 27.89 KG/M2 | DIASTOLIC BLOOD PRESSURE: 64 MMHG | HEART RATE: 75 BPM

## 2024-12-06 DIAGNOSIS — M54.50 LUMBAR SPINE PAIN: ICD-10-CM

## 2024-12-06 DIAGNOSIS — Z12.11 SCREENING FOR COLON CANCER: ICD-10-CM

## 2024-12-06 DIAGNOSIS — G62.9 NEUROPATHY: ICD-10-CM

## 2024-12-06 PROCEDURE — 3078F DIAST BP <80 MM HG: CPT | Performed by: FAMILY MEDICINE

## 2024-12-06 PROCEDURE — 99214 OFFICE O/P EST MOD 30 MIN: CPT | Performed by: FAMILY MEDICINE

## 2024-12-06 PROCEDURE — 3074F SYST BP LT 130 MM HG: CPT | Performed by: FAMILY MEDICINE

## 2024-12-06 RX ORDER — DULOXETIN HYDROCHLORIDE 30 MG/1
30 CAPSULE, DELAYED RELEASE ORAL DAILY
Qty: 90 CAPSULE | Refills: 0 | Status: SHIPPED | OUTPATIENT
Start: 2024-12-06

## 2024-12-06 RX ORDER — TRAMADOL HYDROCHLORIDE 50 MG/1
50 TABLET ORAL EVERY 8 HOURS PRN
Qty: 21 TABLET | Refills: 0 | Status: SHIPPED | OUTPATIENT
Start: 2024-12-06 | End: 2024-12-13

## 2024-12-06 ASSESSMENT — FIBROSIS 4 INDEX: FIB4 SCORE: 1.65

## 2024-12-06 NOTE — PROGRESS NOTES
Subjective:     CC:   Chief Complaint   Patient presents with    Follow-Up     Pt saw dr espitia and wants to discuss.       Medication Follow-up     On semaglutide from [facility]       HPI:   Shawn presents today for follow-up.  Patient did see Dr. Espitia since she did see a back surgeon who wanted to do surgery but patient would prefer not to have back surgery at this time.  Patient did see Dr. Espitia who recommended possibly starting on Cymbalta to help with her back discomfort he also recommended she continue the Lyrica at this time.  It appears he is recommending that I continue the prescriptions at this time.  Patient does have a family emergency so she will be out for the next couple of months.    Past Medical History:   Diagnosis Date    Chronic low back pain     Hypertension     MVP (mitral valve prolapse)     Pericarditis        Social History     Tobacco Use    Smoking status: Former     Current packs/day: 0.00     Types: Cigarettes     Start date: 1980     Quit date: 1988     Years since quittin.5     Passive exposure: Past    Smokeless tobacco: Never   Vaping Use    Vaping status: Never Used   Substance Use Topics    Alcohol use: Yes     Alcohol/week: 0.0 oz     Comment: rare    Drug use: No       Current Outpatient Medications Ordered in Epic   Medication Sig Dispense Refill    Semaglutide, 1 MG/DOSE, 4 MG/3ML Solution Pen-injector Inject  under the skin.      DULoxetine (CYMBALTA) 30 MG Cap DR Particles Take 1 Capsule by mouth every day. 90 Capsule 0    traMADol (ULTRAM) 50 MG Tab Take 1 Tablet by mouth every 8 hours as needed for Severe Pain for up to 7 days. 21 Tablet 0    omeprazole (PRILOSEC) 40 MG delayed-release capsule TAKE 1 CAPSULE BY MOUTH EVERY DAY 90 Capsule 1    meloxicam (MOBIC) 15 MG tablet TAKE 1 TABLET BY MOUTH EVERY DAY WITH FOOD. NO ADVIL/ALEVE/MOTRIN/IBUPROFEN ON THE SAME DAY. 90 Tablet 1    Melatonin 1 MG Cap Take  by mouth.      pregabalin (LYRICA) 100 MG Cap Take 1  "Capsule by mouth 2 times a day for 180 days. 180 Capsule 1    simvastatin (ZOCOR) 20 MG Tab Take 1 Tablet by mouth every evening. 90 Tablet 1    tretinoin (RETIN-A) 0.025 % cream APPLY PEA SIZED AMOUNT TOPICALLY TO FACE EVERY 3 NIGHTS. INCREASE TO EVERY NIGHT AS IRRITATION ALLOWS       No current Epic-ordered facility-administered medications on file.       Allergies:  Patient has no known allergies.    Health Maintenance: Completed    ROS:  Gen: no fevers/chills, no changes in weight  Eyes: no changes in vision  ENT: no sore throat, no hearing loss, no bloody nose  Pulm: no sob, no cough  CV: no chest pain, no palpitations  GI: no nausea/vomiting, no diarrhea  : no dysuria  Neuro: no headaches, no numbness/tingling  Heme/Lymph: no easy bruising    Objective:     Exam:  BP 98/64   Pulse 75   Temp 36.5 °C (97.7 °F) (Temporal)   Resp 16   Ht 1.6 m (5' 3\")   Wt 71.4 kg (157 lb 6.4 oz)   SpO2 96%   BMI 27.88 kg/m²  Body mass index is 27.88 kg/m².    Gen: Alert and oriented, No apparent distress.  Skin: Warm and dry.  No obvious lesions.  Eyes: Sclera wnl Pupils normal in size  Lungs: Normal effort, CTA bilaterally, no wheezes, rhonchi, or rales  CV: Regular rate and rhythm. No murmurs, rubs, or gallops.  Musculoskeletal: Normal gait. No extremity cyanosis, clubbing, or edema.  Neuro: Oriented to person, place and time  Psych: Mood is wnl       Assessment & Plan:     68 y.o. female with the following -     1. Lumbar spine pain  Patient is requesting some more tramadol patient did get 21 tablets in August she uses it very sparingly.  Patient expressed understanding she should not request another refill for another 4 to 5 months.  - traMADol (ULTRAM) 50 MG Tab; Take 1 Tablet by mouth every 8 hours as needed for Severe Pain for up to 7 days.  Dispense: 21 Tablet; Refill: 0    2. Neuropathy  Patient to continue the Lyrica we will add the Cymbalta to see if it seems to help.    3. Screening for colon cancer  Patient " is wanting to go ahead and get her Cologuard done she is scheduled for her mammogram on 11 December  - Cologuard® colon cancer screening      - DULoxetine (CYMBALTA) 30 MG Cap DR Particles; Take 1 Capsule by mouth every day.  Dispense: 90 Capsule; Refill: 0       Return in about 3 months (around 3/6/2025), or if symptoms worsen or fail to improve.  Patient is not due for another Tdap vaccinations until 2027.    Please note that this dictation was created using voice recognition software. I have made every reasonable attempt to correct obvious errors, but I expect that there are errors of grammar and possibly content that I did not discover before finalizing the note.

## 2024-12-11 ENCOUNTER — HOSPITAL ENCOUNTER (OUTPATIENT)
Dept: RADIOLOGY | Facility: MEDICAL CENTER | Age: 69
End: 2024-12-11
Attending: FAMILY MEDICINE
Payer: MEDICARE

## 2024-12-11 DIAGNOSIS — Z12.31 VISIT FOR SCREENING MAMMOGRAM: ICD-10-CM

## 2024-12-11 PROCEDURE — 77067 SCR MAMMO BI INCL CAD: CPT

## 2025-01-01 LAB — NONINV COLON CA DNA+OCC BLD SCRN STL QL: NEGATIVE

## 2025-01-28 ENCOUNTER — APPOINTMENT (OUTPATIENT)
Dept: RADIOLOGY | Facility: REHABILITATION | Age: 70
End: 2025-01-28
Attending: PHYSICAL MEDICINE & REHABILITATION
Payer: MEDICARE

## 2025-01-28 ENCOUNTER — HOSPITAL ENCOUNTER (OUTPATIENT)
Facility: REHABILITATION | Age: 70
End: 2025-01-28
Attending: PHYSICAL MEDICINE & REHABILITATION | Admitting: PHYSICAL MEDICINE & REHABILITATION
Payer: MEDICARE

## 2025-01-28 VITALS
OXYGEN SATURATION: 97 % | DIASTOLIC BLOOD PRESSURE: 84 MMHG | HEART RATE: 75 BPM | BODY MASS INDEX: 26.02 KG/M2 | TEMPERATURE: 97.9 F | RESPIRATION RATE: 16 BRPM | WEIGHT: 146.83 LBS | SYSTOLIC BLOOD PRESSURE: 121 MMHG | HEIGHT: 63 IN

## 2025-01-28 PROCEDURE — 700101 HCHG RX REV CODE 250

## 2025-01-28 PROCEDURE — 64493 INJ PARAVERT F JNT L/S 1 LEV: CPT

## 2025-01-28 PROCEDURE — 700117 HCHG RX CONTRAST REV CODE 255

## 2025-01-28 PROCEDURE — 64494 INJ PARAVERT F JNT L/S 2 LEV: CPT

## 2025-01-28 PROCEDURE — 700111 HCHG RX REV CODE 636 W/ 250 OVERRIDE (IP): Mod: JZ

## 2025-01-28 RX ORDER — LIDOCAINE HYDROCHLORIDE 20 MG/ML
INJECTION, SOLUTION EPIDURAL; INFILTRATION; INTRACAUDAL; PERINEURAL
Status: DISCONTINUED
Start: 2025-01-28 | End: 2025-01-28 | Stop reason: HOSPADM

## 2025-01-28 RX ORDER — LIDOCAINE HYDROCHLORIDE 10 MG/ML
INJECTION, SOLUTION EPIDURAL; INFILTRATION; INTRACAUDAL; PERINEURAL
Status: DISCONTINUED
Start: 2025-01-28 | End: 2025-01-28 | Stop reason: HOSPADM

## 2025-01-28 RX ADMIN — LIDOCAINE HYDROCHLORIDE 10 ML: 20 INJECTION, SOLUTION EPIDURAL; INFILTRATION; INTRACAUDAL; PERINEURAL at 10:52

## 2025-01-28 RX ADMIN — LIDOCAINE HYDROCHLORIDE 10 ML: 10 INJECTION, SOLUTION EPIDURAL; INFILTRATION; INTRACAUDAL; PERINEURAL at 10:52

## 2025-01-28 RX ADMIN — IOHEXOL 5 ML: 240 INJECTION, SOLUTION INTRATHECAL; INTRAVASCULAR; INTRAVENOUS; ORAL at 10:52

## 2025-01-28 ASSESSMENT — PAIN DESCRIPTION - PAIN TYPE: TYPE: CHRONIC PAIN

## 2025-01-28 ASSESSMENT — FIBROSIS 4 INDEX: FIB4 SCORE: 1.67

## 2025-01-28 NOTE — H&P
Physical medicine and rehabilitation preprocedure history & Physical Note    Date  1/28/2025    Primary Care Physician  Princess Mena M.D.    CC  Pre-Op Diagnosis Codes:      * Lumbar spondylosis [M47.816]     HPI  This is a 69 y.o. female who presented with axial low back pain present for many years overall.  Not improved with conservative treatments including the past 6 months.    See previous notes of Dr. Rios    Past Medical History:   Diagnosis Date    Chronic low back pain     Hypertension     MVP (mitral valve prolapse)     Pericarditis 2010       Past Surgical History:   Procedure Laterality Date    LA NJX AA&/STRD TFRML EPI LUMBAR/SACRAL 1 LEVEL Bilateral 10/2/2024    Procedure: BILATERAL L3-4 transforaminal epidural steroid injection with fluoroscopic guidance;  Surgeon: Marco Rios M.D.;  [facility];  Service: Pain Management    LA INJECTION,SACROILIAC JOINT Bilateral 5/14/2024    Procedure: RIGHT and Left sacroiliac joint injection with fluoroscopic guidance;  Surgeon: Marco Rios M.D.;  [facility];  Service: Pain Management    LA NJX AA&/STRD TFRML EPI LUMBAR/SACRAL 1 LEVEL Bilateral 2/13/2024    Procedure: BILATERAL L5-S1 versus L4-5 transforaminal epidural steroid injection with fluoroscopic guidance.    Note: cervical injection approximately 1 month prior to the lumbar;  Surgeon: Marco Rios M.D.;  [facility];  Service: Pain Management    LA INJ CERV/THORAC,W/ IMAGING Left 1/16/2024    Procedure: LEFT cervical C7-T1 interlaminar epidural steroid injection.    Note: cervical approximately 1 month prior to the lumbar;  Surgeon: Marco Rios M.D.;  [facility];  Service: Pain Management    [place] INJ CERV/THORAC,W/ IMAGING N/A 1/10/2023    Procedure: C7-T1 interlaminar epidural steroid injection;  Surgeon: Marco Rios M.D.;  [facility]: [facility];   Service: Pain Management    [place] INJ CERV/THORAC,W/ IMAGING N/A 2022    Procedure: C7-T1 interlaminar epidural steroid injection;  Surgeon: Marco Rios M.D.;  [facility]: [facility];  Service: Pain Management    ABDOMINAL HYSTERECTOMY TOTAL      cervix remains and one ovary.     ARTHROPLASTY Bilateral     Total knee replacements    LAMINOTOMY      L4-5, L5-S1 fusion    OTHER ORTHOPEDIC SURGERY      spinal fusion    OTHER ORTHOPEDIC SURGERY      right knee replacement       No current facility-administered medications for this encounter.       Social History     Socioeconomic History    Marital status:      Spouse name: Not on file    Number of children: Not on file    Years of education: Not on file    Highest education level: Not on file   Occupational History     Employer: OTHER     Comment: retired   Tobacco Use    Smoking status: Former     Current packs/day: 0.00     Types: Cigarettes     Start date: 1980     Quit date: 1988     Years since quittin.6     Passive exposure: Past    Smokeless tobacco: Never   Vaping Use    Vaping status: Never Used   Substance and Sexual Activity    Alcohol use: Yes     Alcohol/week: 0.0 oz     Comment: rare    Drug use: No    Sexual activity: Not Currently     Comment:    Other Topics Concern     Service No    Blood Transfusions No    Caffeine Concern No    Occupational Exposure No    Hobby Hazards No    Sleep Concern Yes     Comment: due to pain    Stress Concern Yes    Weight Concern No    Special Diet No    Back Care No    Exercise Yes    Bike Helmet No    Seat Belt Yes    Self-Exams Yes   Social History Narrative    Not on file     Social Drivers of Health     Financial Resource Strain: Not on file   Food Insecurity: Not on file   Transportation Needs: Not on file   Physical Activity: Not on file   Stress: Not on file   Social Connections: Not on file   Intimate Partner Violence: Not on file   Housing Stability:  Not on file       Family History   Problem Relation Age of Onset    Heart Disease Mother     Heart Disease Father     Cancer Father     Alcohol abuse Father     Diabetes Sister     Diabetes Sister     Breast Cancer Maternal Grandmother     Dementia Paternal Grandmother        Allergies  Patient has no known allergies.    Review of Systems  Comprehensive review of systems was negative       Physical Exam  Constitutional:       General: She is not in acute distress.     Appearance: Normal appearance. She is not ill-appearing, toxic-appearing or diaphoretic.   Cardiovascular:      Rate and Rhythm: Normal rate and regular rhythm.      Pulses: Normal pulses.      Heart sounds: Normal heart sounds.   Pulmonary:      Effort: Pulmonary effort is normal.      Breath sounds: Normal breath sounds.   Abdominal:      General: Abdomen is flat. Bowel sounds are normal. There is no distension.      Palpations: Abdomen is soft.      Tenderness: There is no abdominal tenderness. There is no guarding or rebound.   Skin:     General: Skin is warm and dry.      Capillary Refill: Capillary refill takes less than 2 seconds.   Neurological:      Mental Status: She is alert.          Vital Signs                        Vitals reviewed    Labs:                    Radiology:  DX-PORTABLE FLUOROSCOPY < 1 HOUR Reason For Exam: pain    (Results Pending)         Assessment/Plan:  Pre-Op Diagnosis Codes:      * Lumbar spondylosis [M47.816]  Procedure(s):  Diagnostic medial branch blocks targeting the BILATERAL L2-3 and L3-4 facet joints with fluoroscopic guidance #1

## 2025-01-28 NOTE — PROGRESS NOTES
1023 Pt arrived to pre-procedure area. Procedure & plan for recovery reviewed, site confirmed, & consent signed. Allergies & current medications verified. Appointed  waiting in lobby. Printed discharge instructions discussed & signed. MD to bedside prior to procedure.     1110 Pt to recovery area s/p MBB #1 & updates received from procedure RN. Pt reports relief in pain at this time & increased ROM. Pt tolerating PO fluids & snacks. Dr. Rios to bedside for post-procedure evaluation.      1130 Pt ambulates without difficulty & meets DC criteria. RN assisted pt off unit to appointed .

## 2025-01-28 NOTE — OP REPORT
Date of Service: 1/28/2025     Patient: Shawn Westbrook 69 y.o. female     MRN: [id number]     Physician/s: Marco Rios MD    Pre-operative Diagnosis: Lumbosacral spondylosis, facet arthropathy. The patient was NOT seen for lumbar radiculopathy today.     Post-operative Diagnosis: Lumbosacral spondylosis, facet arthropathy. The patient was NOT seen for lumbar radiculopathy today.     Procedure: Medial Branch Blocks targeting the bilateral  L2-3 and L3-4  facet joints     Description of procedure:    The risks, benefits, and alternatives of the procedure were reviewed and discussed with the patient.  Written informed consent was freely obtained. A pre-procedural time-out was conducted by the physician verifying patient’s identity, procedure to be performed, procedure site and side, and allergy verification. Appropriate equipment was determined to be in place for the procedure.         In the fluoroscopy suite the patient was placed in a prone position and the skin was prepped and draped in the usual sterile fashion. The fluoroscope was placed over the lower back at the appropriate angles, and the targets for injection were marked. A 27g needle was placed into each of the markings at the levels below, and approx 1mL of 1% Lidocaine was injected subcutaneously into the epidermal and dermal layers. The needle was removed intact.     Using an oblique view, A 22g 5 inch needle was then placed at the intersection of the transverse process and superior articular process at the L2-3 facet joint where the L1 medial branch runs on the left side. The needle tips were then verified by AP, oblique, and lateral views.     Using an oblique view, A 22g 5 inch needle was then placed at the intersection of the transverse process and superior articular process at the L3-4 facet joint where the L2 medial branch runs  on the left side. The needle tips were then verified by AP, oblique, and lateral views.     Using an oblique view, A  22g 5 inch needle was then placed at the intersection of the transverse process and superior articular process at the L4-5 facet joint where the L3 medial branch runs  on the left side. The needle tips were then verified by AP, oblique, and lateral views.     Using an oblique view, A 22g 5 inch needle was then placed at the intersection of the transverse process and superior articular process at the L2-3 facet joint where the L1 medial branch runs  on the right side. The needle tips were then verified by AP, oblique, and lateral views.       Using an oblique view, A 22g 5 inch needle was then placed at the intersection of the transverse process and superior articular process at the L3-4 facet joint where the L2 medial branch runs  on the right side. The needle tips were then verified by AP, oblique, and lateral views.     Using an oblique view, A 22g 5 inch needle was then placed at the intersection of the transverse process and superior articular process at the L4-5 facet joint where the L3 medial branch runs  on the right side. The needle tips were then verified by AP, oblique, and lateral views.        In the AP view, less than 1mL of contrast dye was used to highlight the medial branch while the fluoroscope was running live at the levels above. Following negative aspiration, approximately 1mL of 2% lidocaine  was then injected at the above levels, and the needles were removed intact after restyleted. The patient's back was covered with a 4x4 gauze, the area was cleansed with sterile normal saline, and a dressing was applied.     There were no complications noted, the patient was hemodynamically stable, and tolerated the procedure well.     The patient had 80% pain relief of the index pain minutes post procedure.  Preprocedure pain 6/10 NRS  Postprocedure pain 1 /10 NRS      Follow-up as scheduled      Marco Rios MD  Physical Medicine and Rehabilitation  Interventional Spine and Sports Physiatry  [facility]            CPT  Intraarticular joint or medial branch block (MBB) - lumbar or sacral (1st level):  24379-97-[id number]  Intraarticular joint or medial branch block (MBB) - lumbar or sacral (2nd level):  83872-45-uw

## 2025-01-28 NOTE — OR SURGEON
Immediate Post OP Note    Pre-Op Diagnosis Codes:      * Lumbar spondylosis [M47.816]      Post-Op Diagnosis Codes:     * Lumbar spondylosis [M47.816]      Procedure(s):  Diagnostic medial branch blocks targeting the BILATERAL L2-3 and L3-4 facet joints with fluoroscopic guidance #1 - Wound Class: Clean    Surgeon(s):  Marco Rios M.D.    Anesthesiologist/Type of Anesthesia:  No anesthesia staff entered./Local    Surgical Staff:  Circulator: Ryann Amato R.N.  Scrub Person: Sri Alfaro R.N.  Radiology Technologist: Hussain Mattson    Specimens removed if any:  * No specimens in log *    Estimated Blood Loss: None    Findings: None    Complications: None        1/28/2025 11:08 AM Marco Rios M.D.

## 2025-01-29 ENCOUNTER — TELEPHONE (OUTPATIENT)
Dept: PHYSICAL MEDICINE AND REHAB | Facility: MEDICAL CENTER | Age: 70
End: 2025-01-29
Payer: MEDICARE

## 2025-01-29 NOTE — TELEPHONE ENCOUNTER
Called for post-sp check-up. Pt reported the following regarding the procedure site: Diagnostic medial branch blocks targeting the BILATERAL L2-3 and L3-4 facet joints with fluoroscopic guidance #1     Change in pain?: just for 5 hrs relief    Concerns?: No    Confirmed [facility] appt?: Yes

## 2025-02-11 ENCOUNTER — HOSPITAL ENCOUNTER (OUTPATIENT)
Facility: REHABILITATION | Age: 70
End: 2025-02-11
Attending: PHYSICAL MEDICINE & REHABILITATION | Admitting: PHYSICAL MEDICINE & REHABILITATION
Payer: MEDICARE

## 2025-02-11 ENCOUNTER — APPOINTMENT (OUTPATIENT)
Dept: RADIOLOGY | Facility: REHABILITATION | Age: 70
End: 2025-02-11
Attending: PHYSICAL MEDICINE & REHABILITATION
Payer: MEDICARE

## 2025-02-11 VITALS
RESPIRATION RATE: 16 BRPM | DIASTOLIC BLOOD PRESSURE: 94 MMHG | WEIGHT: 145.5 LBS | TEMPERATURE: 97.7 F | OXYGEN SATURATION: 99 % | BODY MASS INDEX: 25.78 KG/M2 | SYSTOLIC BLOOD PRESSURE: 129 MMHG | HEIGHT: 63 IN | HEART RATE: 79 BPM

## 2025-02-11 PROCEDURE — 700101 HCHG RX REV CODE 250

## 2025-02-11 PROCEDURE — 700117 HCHG RX CONTRAST REV CODE 255

## 2025-02-11 PROCEDURE — 64494 INJ PARAVERT F JNT L/S 2 LEV: CPT

## 2025-02-11 PROCEDURE — 700111 HCHG RX REV CODE 636 W/ 250 OVERRIDE (IP): Mod: JZ

## 2025-02-11 PROCEDURE — 64493 INJ PARAVERT F JNT L/S 1 LEV: CPT

## 2025-02-11 RX ORDER — LIDOCAINE HYDROCHLORIDE 20 MG/ML
INJECTION, SOLUTION EPIDURAL; INFILTRATION; INTRACAUDAL; PERINEURAL
Status: COMPLETED
Start: 2025-02-11 | End: 2025-02-11

## 2025-02-11 RX ORDER — LIDOCAINE HYDROCHLORIDE 10 MG/ML
INJECTION, SOLUTION EPIDURAL; INFILTRATION; INTRACAUDAL; PERINEURAL
Status: COMPLETED
Start: 2025-02-11 | End: 2025-02-11

## 2025-02-11 RX ADMIN — LIDOCAINE HYDROCHLORIDE 10 ML: 20 INJECTION, SOLUTION EPIDURAL; INFILTRATION; INTRACAUDAL; PERINEURAL at 09:46

## 2025-02-11 RX ADMIN — LIDOCAINE HYDROCHLORIDE 10 ML: 10 INJECTION, SOLUTION EPIDURAL; INFILTRATION; INTRACAUDAL; PERINEURAL at 09:46

## 2025-02-11 RX ADMIN — IOHEXOL 5 ML: 240 INJECTION, SOLUTION INTRATHECAL; INTRAVASCULAR; INTRAVENOUS; ORAL at 09:46

## 2025-02-11 ASSESSMENT — FIBROSIS 4 INDEX: FIB4 SCORE: 1.67

## 2025-02-11 ASSESSMENT — PAIN DESCRIPTION - PAIN TYPE
TYPE: CHRONIC PAIN
TYPE: CHRONIC PAIN

## 2025-02-11 NOTE — OR SURGEON
Immediate Post OP Note    Pre-Op Diagnosis Codes:      * Lumbar spondylosis [M47.816]      Post-Op Diagnosis Codes:     * Lumbar spondylosis [M47.816]      Procedure(s):  Diagnostic medial branch blocks targeting the BILATERAL L2-3 and L3-4 facet joints with fluoroscopic guidance #2     - Wound Class: Clean    Surgeon(s):  Marco Rios M.D.    Anesthesiologist/Type of Anesthesia:  No anesthesia staff entered./Local    Surgical Staff:  Circulator: Sri Alfaro R.N.  Scrub Person: Cecy Rod  Radiology Technologist: Shelly Baptiste    Specimens removed if any:  * No specimens in log *    Estimated Blood Loss: None    Findings: None    Complications: None        2/11/2025 9:56 AM Marco Rios M.D.

## 2025-02-11 NOTE — H&P
Physical medicine and rehabilitation preprocedure history & Physical Note    Date  2/11/2025    Primary Care Physician  Princess Mena M.D.    CC  Pre-Op Diagnosis Codes:      * Lumbar spondylosis [M47.816]     HPI  This is a 69 y.o. female who presented with axial low back pain present for many years overall.  Not improved with conservative treatments including the past 6 months.     The patient had 80% pain relief after the diagnostic medial branch blocks targeting the same levels during the diagnostic phase.  Preprocedure pain 6/10 NRS postprocedure pain 1/10 NRS.  She had improved ability for bed mobility and ADLs.  She can rise from a supine position with no pain which she was unable to do prior to the medial branch block.  The pain returned back to baseline as expected after the diagnostic phase.  This is a positive diagnostic block      See previous notes of Dr. Rios    Past Medical History:   Diagnosis Date    Chronic low back pain     Hypertension     MVP (mitral valve prolapse)     Pericarditis 2010       Past Surgical History:   Procedure Laterality Date    LUMBAR MEDIAL BRANCH BLOCKS Bilateral 1/28/2025    Procedure: Diagnostic medial branch blocks targeting the BILATERAL L2-3 and L3-4 facet joints with fluoroscopic guidance #1;  Surgeon: Marco Rios M.D.;  [facility]: [facility];  Service: Pain Management    [place] NJX AA&/STRD TFRML EPI LUMBAR/SACRAL 1 LEVEL Bilateral 10/2/2024    Procedure: BILATERAL L3-4 transforaminal epidural steroid injection with fluoroscopic guidance;  Surgeon: Marco Rios M.D.;  [facility]: [facility];  Service: Pain Management    [place] INJECTION,SACROILIAC JOINT Bilateral 5/14/2024    Procedure: RIGHT and Left sacroiliac joint injection with fluoroscopic guidance;  Surgeon: Marco Rios M.D.;  [facility]: [facility];  Service: Pain Management    MS NJX AA&/STRD TFRML EPI LUMBAR/SACRAL 1 LEVEL Bilateral 2/13/2024     Procedure: BILATERAL L5-S1 versus L4-5 transforaminal epidural steroid injection with fluoroscopic guidance.    Note: cervical injection approximately 1 month prior to the lumbar;  Surgeon: Marco Rios M.D.;  [facility]: [facility];  Service: Pain Management    [place] INJ CERV/THORAC,W/ IMAGING Left 2024    Procedure: LEFT cervical C7-T1 interlaminar epidural steroid injection.    Note: cervical approximately 1 month prior to the lumbar;  Surgeon: Marco Rios M.D.;  [facility]: [facility];  Service: Pain Management    [place] INJ CERV/THORAC,W/ IMAGING N/A 1/10/2023    Procedure: C7-T1 interlaminar epidural steroid injection;  Surgeon: Marco Rios M.D.;  [facility]: [facility];  Service: Pain Management    [place] INJ CERV/THORAC,W/ IMAGING N/A 2022    Procedure: C7-T1 interlaminar epidural steroid injection;  Surgeon: Marco Rios M.D.;  [facility]: [facility];  Service: Pain Management    ABDOMINAL HYSTERECTOMY TOTAL      cervix remains and one ovary.     ARTHROPLASTY Bilateral     Total knee replacements    LAMINOTOMY      L4-5, L5-S1 fusion    OTHER ORTHOPEDIC SURGERY      spinal fusion    OTHER ORTHOPEDIC SURGERY      right knee replacement       No current facility-administered medications for this encounter.       Social History     Socioeconomic History    Marital status:      Spouse name: Not on file    Number of children: Not on file    Years of education: Not on file    Highest education level: Not on file   Occupational History     Employer: OTHER     Comment: retired   Tobacco Use    Smoking status: Former     Current packs/day: 0.00     Types: Cigarettes     Start date: 1980     Quit date: 1988     Years since quittin.7     Passive exposure: Past    Smokeless tobacco: Never   Vaping Use    Vaping status: Never Used   Substance and Sexual Activity    Alcohol use: Yes     Alcohol/week: 0.0 oz      Comment: rare    Drug use: No    Sexual activity: Not Currently     Comment:    Other Topics Concern     Service No    Blood Transfusions No    Caffeine Concern No    Occupational Exposure No    Hobby Hazards No    Sleep Concern Yes     Comment: due to pain    Stress Concern Yes    Weight Concern No    Special Diet No    Back Care No    Exercise Yes    Bike Helmet No    Seat Belt Yes    Self-Exams Yes   Social History Narrative    Not on file     Social Drivers of Health     Financial Resource Strain: Not on file   Food Insecurity: Not on file   Transportation Needs: Not on file   Physical Activity: Not on file   Stress: Not on file   Social Connections: Not on file   Intimate Partner Violence: Not on file   Housing Stability: Not on file       Family History   Problem Relation Age of Onset    Heart Disease Mother     Heart Disease Father     Cancer Father     Alcohol abuse Father     Diabetes Sister     Diabetes Sister     Breast Cancer Maternal Grandmother     Dementia Paternal Grandmother        Allergies  Patient has no known allergies.    Review of Systems  Comprehensive review of systems was negative       Physical Exam  Constitutional:       General: She is not in acute distress.     Appearance: Normal appearance. She is not ill-appearing, toxic-appearing or diaphoretic.   Cardiovascular:      Rate and Rhythm: Normal rate and regular rhythm.      Pulses: Normal pulses.      Heart sounds: Normal heart sounds.   Pulmonary:      Effort: Pulmonary effort is normal.      Breath sounds: Normal breath sounds.   Abdominal:      General: Abdomen is flat. Bowel sounds are normal. There is no distension.      Palpations: Abdomen is soft.      Tenderness: There is no abdominal tenderness. There is no guarding or rebound.   Skin:     General: Skin is warm and dry.      Capillary Refill: Capillary refill takes less than 2 seconds.   Neurological:      Mental Status: She is alert.          Vital Signs                         Vitals reviewed    Labs:                    Radiology:  DX-PORTABLE FLUOROSCOPY < 1 HOUR Reason For Exam: pain    (Results Pending)         Assessment/Plan:  Pre-Op Diagnosis Codes:      * Lumbar spondylosis [M47.816]  Procedure(s):  Diagnostic medial branch blocks targeting the BILATERAL L2-3 and L3-4 facet joints with fluoroscopic guidance #2

## 2025-02-11 NOTE — OP REPORT
Date of Service: 2/11/2025       Patient: Shawn Westbrook 69 y.o. female     MRN: [id number]      Physician/s: Marco Rios MD     Pre-operative Diagnosis: Lumbosacral spondylosis, facet arthropathy. The patient was NOT seen for lumbar radiculopathy today.      Post-operative Diagnosis: Lumbosacral spondylosis, facet arthropathy. The patient was NOT seen for lumbar radiculopathy today.      Procedure: Medial Branch Blocks targeting the bilateral  L2-3 and L3-4  facet joints      Description of procedure:     The risks, benefits, and alternatives of the procedure were reviewed and discussed with the patient.  Written informed consent was freely obtained. A pre-procedural time-out was conducted by the physician verifying patient’s identity, procedure to be performed, procedure site and side, and allergy verification. Appropriate equipment was determined to be in place for the procedure.            In the fluoroscopy suite the patient was placed in a prone position and the skin was prepped and draped in the usual sterile fashion. The fluoroscope was placed over the lower back at the appropriate angles, and the targets for injection were marked. A 27g needle was placed into each of the markings at the levels below, and approx 1mL of 1% Lidocaine was injected subcutaneously into the epidermal and dermal layers. The needle was removed intact.     Using an oblique view, A 22g 5 inch needle was then placed at the intersection of the transverse process and superior articular process at the L2-3 facet joint where the L1 medial branch runs on the left side. The needle tips were then verified by AP, oblique, and lateral views.      Using an oblique view, A 22g 5 inch needle was then placed at the intersection of the transverse process and superior articular process at the L3-4 facet joint where the L2 medial branch runs  on the left side. The needle tips were then verified by AP, oblique, and lateral views.      Using an  oblique view, A 22g 5 inch needle was then placed at the intersection of the transverse process and superior articular process at the L4-5 facet joint where the L3 medial branch runs  on the left side. The needle tips were then verified by AP, oblique, and lateral views.      Using an oblique view, A 22g 5 inch needle was then placed at the intersection of the transverse process and superior articular process at the L2-3 facet joint where the L1 medial branch runs  on the right side. The needle tips were then verified by AP, oblique, and lateral views.         Using an oblique view, A 22g 5 inch needle was then placed at the intersection of the transverse process and superior articular process at the L3-4 facet joint where the L2 medial branch runs  on the right side. The needle tips were then verified by AP, oblique, and lateral views.      Using an oblique view, A 22g 5 inch needle was then placed at the intersection of the transverse process and superior articular process at the L4-5 facet joint where the L3 medial branch runs  on the right side. The needle tips were then verified by AP, oblique, and lateral views.         In the AP view, less than 1mL of contrast dye was used to highlight the medial branch while the fluoroscope was running live at the levels above. Following negative aspiration, approximately 1mL of 2% lidocaine  was then injected at the above levels, and the needles were removed intact after restyleted. The patient's back was covered with a 4x4 gauze, the area was cleansed with sterile normal saline, and a dressing was applied.      There were no complications noted, the patient was hemodynamically stable, and tolerated the procedure well.      The patient had 80 percent pain relief postprocedure  Preprocedure pain 7/10 NRS  Postprocedure pain 2 /10 NRS         Follow-up as scheduled        Marco Rios MD  Physical Medicine and Rehabilitation  Interventional Spine and Sports  Physiatry  [facility]              CPT  Intraarticular joint or medial branch block (MBB) - lumbar or sacral (1st level):  [id number]  Intraarticular joint or medial branch block (MBB) - lumbar or sacral (2nd level):  [id number]

## 2025-02-12 ENCOUNTER — TELEPHONE (OUTPATIENT)
Dept: PHYSICAL MEDICINE AND REHAB | Facility: MEDICAL CENTER | Age: 70
End: 2025-02-12
Payer: MEDICARE

## 2025-02-12 NOTE — TELEPHONE ENCOUNTER
Called for post-sp check-up. Pt reported the following regarding the procedure site: Diagnostic medial branch blocks targeting the BILATERAL L2-3 and L3-4 facet joints with fluoroscopic guidance #2     Change in pain?: LVM    Concerns?:     Confirmed [facility] appt?:

## 2025-02-19 PROBLEM — M25.532 LEFT WRIST PAIN: Status: ACTIVE | Noted: 2025-02-19

## 2025-02-26 ENCOUNTER — APPOINTMENT (OUTPATIENT)
Dept: PHYSICAL MEDICINE AND REHAB | Facility: MEDICAL CENTER | Age: 70
End: 2025-02-26
Payer: MEDICARE

## 2025-03-04 DIAGNOSIS — E78.2 MIXED HYPERLIPIDEMIA: ICD-10-CM

## 2025-03-04 RX ORDER — SIMVASTATIN 20 MG
20 TABLET ORAL EVERY EVENING
Qty: 90 TABLET | Refills: 1 | Status: SHIPPED | OUTPATIENT
Start: 2025-03-04

## 2025-03-06 ENCOUNTER — OFFICE VISIT (OUTPATIENT)
Dept: MEDICAL GROUP | Facility: PHYSICIAN GROUP | Age: 70
End: 2025-03-06
Payer: MEDICARE

## 2025-03-06 VITALS
RESPIRATION RATE: 16 BRPM | WEIGHT: 142.2 LBS | SYSTOLIC BLOOD PRESSURE: 100 MMHG | BODY MASS INDEX: 25.2 KG/M2 | DIASTOLIC BLOOD PRESSURE: 60 MMHG | OXYGEN SATURATION: 98 % | TEMPERATURE: 98.3 F | HEART RATE: 78 BPM | HEIGHT: 63 IN

## 2025-03-06 DIAGNOSIS — F41.9 ANXIETY AND DEPRESSION: ICD-10-CM

## 2025-03-06 DIAGNOSIS — F32.A ANXIETY AND DEPRESSION: ICD-10-CM

## 2025-03-06 DIAGNOSIS — G56.01 CARPAL TUNNEL SYNDROME OF RIGHT WRIST: ICD-10-CM

## 2025-03-06 DIAGNOSIS — M54.50 LUMBAR SPINE PAIN: ICD-10-CM

## 2025-03-06 PROCEDURE — 99214 OFFICE O/P EST MOD 30 MIN: CPT | Performed by: FAMILY MEDICINE

## 2025-03-06 PROCEDURE — 3074F SYST BP LT 130 MM HG: CPT | Performed by: FAMILY MEDICINE

## 2025-03-06 PROCEDURE — 3078F DIAST BP <80 MM HG: CPT | Performed by: FAMILY MEDICINE

## 2025-03-06 RX ORDER — OMEPRAZOLE 40 MG/1
40 CAPSULE, DELAYED RELEASE ORAL DAILY
Qty: 90 CAPSULE | Refills: 1 | Status: SHIPPED | OUTPATIENT
Start: 2025-03-06

## 2025-03-06 RX ORDER — PREGABALIN 100 MG/1
CAPSULE ORAL
Qty: 270 CAPSULE | Refills: 0 | Status: SHIPPED | OUTPATIENT
Start: 2025-03-06 | End: 2025-06-06

## 2025-03-06 RX ORDER — DULOXETIN HYDROCHLORIDE 30 MG/1
30 CAPSULE, DELAYED RELEASE ORAL DAILY
Qty: 90 CAPSULE | Refills: 1 | Status: SHIPPED | OUTPATIENT
Start: 2025-03-06

## 2025-03-06 ASSESSMENT — PATIENT HEALTH QUESTIONNAIRE - PHQ9: CLINICAL INTERPRETATION OF PHQ2 SCORE: 0

## 2025-03-06 ASSESSMENT — FIBROSIS 4 INDEX: FIB4 SCORE: 1.67

## 2025-03-06 NOTE — PROGRESS NOTES
Subjective:     CC:   Chief Complaint   Patient presents with    Follow-Up       HPI:   Shawn presents today requesting refills of her Cymbalta patient is using it for her chronic back discomfort but also reports is really helping her due to fact she is having some anxiety and depression due to to the fact she is caring for her  with medical issues.  Patient also requesting Lyrica she takes 100 mg 1 in the morning and 2 in the evening.  I did stressed to her that she needs to continue to communicate with Dr. Rios he may be doing some treatments for her lower back a possible ablation in the future with that said on hoping that maybe her Lyrica could be decreased.  Patient is also requesting omeprazole refill.  Patient is going for wrist surgery and wanted me to refill her tramadol but I explained to patient that since she is getting a surgical procedure pain management for that procedure should be with the surgeon and not me.  Patient was concerned since she has a controlled substance agreement with me but explained to patient for surgical procedures this is a short-term maintenance and it is understandable she may need to be on pain medication for recovery.  At this point patient does not have much of the tramadol left I informed her not to take the tramadol for her port postsurgical pain that the surgeon should be managing her postsurgical pain.  Patient appears to express understanding of this    Past Medical History:   Diagnosis Date    Chronic low back pain     Hypertension     MVP (mitral valve prolapse)     Pericarditis        Social History     Tobacco Use    Smoking status: Former     Current packs/day: 0.00     Types: Cigarettes     Start date: 1980     Quit date: 1988     Years since quittin.7     Passive exposure: Past    Smokeless tobacco: Never   Vaping Use    Vaping status: Never Used   Substance Use Topics    Alcohol use: Yes     Alcohol/week: 0.0 oz     Comment: rare     "Drug use: No       Current Outpatient Medications Ordered in Epic   Medication Sig Dispense Refill    DULoxetine (CYMBALTA) 30 MG Cap DR Particles Take 1 Capsule by mouth every day. 90 Capsule 1    omeprazole (PRILOSEC) 40 MG delayed-release capsule Take 1 Capsule by mouth every day. 90 Capsule 1    pregabalin (LYRICA) 100 MG Cap 1 tablet p.o. in the morning and 2 tablets p.o. in the evening 270 Capsule 0    simvastatin (ZOCOR) 20 MG Tab TAKE 1 TABLET BY MOUTH EVERY EVENING 90 Tablet 1    Semaglutide, 1 MG/DOSE, 4 MG/3ML Solution Pen-injector Inject  under the skin.      meloxicam (MOBIC) 15 MG tablet TAKE 1 TABLET BY MOUTH EVERY DAY WITH FOOD. NO ADVIL/ALEVE/MOTRIN/IBUPROFEN ON THE SAME DAY. 90 Tablet 1    Melatonin 1 MG Cap Take  by mouth.      tretinoin (RETIN-A) 0.025 % cream APPLY PEA SIZED AMOUNT TOPICALLY TO FACE EVERY 3 NIGHTS. INCREASE TO EVERY NIGHT AS IRRITATION ALLOWS       No current Epic-ordered facility-administered medications on file.       Allergies:  Patient has no known allergies.    Health Maintenance: Completed    ROS:  Gen: no fevers/chills, no changes in weight  Eyes: no changes in vision  ENT: no sore throat, no hearing loss, no bloody nose  Pulm: no sob, no cough  CV: no chest pain, no palpitations  GI: no nausea/vomiting, no diarrhea  : no dysuria  Neuro: no headaches, no numbness/tingling  Heme/Lymph: no easy bruising    Objective:     Exam:  /60 (BP Location: Right arm, Patient Position: Sitting, BP Cuff Size: Adult)   Pulse 78   Temp 36.8 °C (98.3 °F)   Resp 16   Ht 1.6 m (5' 3\")   Wt 64.5 kg (142 lb 3.2 oz)   SpO2 98%   BMI 25.19 kg/m²  Body mass index is 25.19 kg/m².    Gen: Alert and oriented, No apparent distress.  Skin: Warm and dry.  No obvious lesions.  Eyes: Sclera wnl Pupils normal in size  Lungs: Normal effort, CTA bilaterally, no wheezes, rhonchi, or rales  CV: Regular rate and rhythm. No murmurs, rubs, or gallops.  Musculoskeletal: Normal gait. No extremity " cyanosis, clubbing, or edema.  Neuro: Oriented to person, place and time  Psych: Mood is wnl       Assessment & Plan:     69 y.o. female with the following -     1. Anxiety and depression  Patient is doing well on the present Cymbalta dose we will continue this    2. Lumbar spine pain  We will continue the Lyrica she was taking 2 tablets twice a day now it is down to 3 tablets a day I will continue to monitor this.    3. Carpal tunnel syndrome of right wrist  Patient should follow the instructions of her surgeon after her wrist procedure and what ever they prescribed for pain control she should take.  I will see her back in about 6 weeks or sooner if she has any other concerns       Return in about 2 months (around 5/6/2025), or if symptoms worsen or fail to improve.    Please note that this dictation was created using voice recognition software. I have made every reasonable attempt to correct obvious errors, but I expect that there are errors of grammar and possibly content that I did not discover before finalizing the note.

## 2025-03-12 ENCOUNTER — APPOINTMENT (OUTPATIENT)
Dept: PHYSICAL MEDICINE AND REHAB | Facility: MEDICAL CENTER | Age: 70
End: 2025-03-12
Payer: MEDICARE

## 2025-03-12 VITALS
OXYGEN SATURATION: 95 % | TEMPERATURE: 98.2 F | HEIGHT: 63 IN | HEART RATE: 76 BPM | DIASTOLIC BLOOD PRESSURE: 71 MMHG | SYSTOLIC BLOOD PRESSURE: 107 MMHG | BODY MASS INDEX: 25.16 KG/M2 | WEIGHT: 142 LBS

## 2025-03-12 DIAGNOSIS — M54.12 CERVICAL RADICULOPATHY: ICD-10-CM

## 2025-03-12 DIAGNOSIS — M79.10 MYALGIA: ICD-10-CM

## 2025-03-12 DIAGNOSIS — M47.812 CERVICAL SPONDYLOSIS: ICD-10-CM

## 2025-03-12 DIAGNOSIS — M53.3 SACROILIAC JOINT DYSFUNCTION OF RIGHT SIDE: ICD-10-CM

## 2025-03-12 DIAGNOSIS — M48.02 CERVICAL SPINAL STENOSIS: ICD-10-CM

## 2025-03-12 DIAGNOSIS — M47.816 LUMBAR SPONDYLOSIS: ICD-10-CM

## 2025-03-12 DIAGNOSIS — M54.16 LUMBAR RADICULOPATHY: ICD-10-CM

## 2025-03-12 PROCEDURE — 99214 OFFICE O/P EST MOD 30 MIN: CPT | Performed by: PHYSICAL MEDICINE & REHABILITATION

## 2025-03-12 PROCEDURE — 3074F SYST BP LT 130 MM HG: CPT | Performed by: PHYSICAL MEDICINE & REHABILITATION

## 2025-03-12 PROCEDURE — 1125F AMNT PAIN NOTED PAIN PRSNT: CPT | Performed by: PHYSICAL MEDICINE & REHABILITATION

## 2025-03-12 PROCEDURE — 3078F DIAST BP <80 MM HG: CPT | Performed by: PHYSICAL MEDICINE & REHABILITATION

## 2025-03-12 RX ORDER — LIDOCAINE 50 MG/G
1 PATCH TOPICAL EVERY 24 HOURS
Qty: 30 PATCH | Refills: 5 | Status: SHIPPED | OUTPATIENT
Start: 2025-03-12

## 2025-03-12 ASSESSMENT — PATIENT HEALTH QUESTIONNAIRE - PHQ9: CLINICAL INTERPRETATION OF PHQ2 SCORE: 0

## 2025-03-12 ASSESSMENT — PAIN SCALES - GENERAL: PAINLEVEL_OUTOF10: 7=MODERATE-SEVERE PAIN

## 2025-03-12 ASSESSMENT — FIBROSIS 4 INDEX: FIB4 SCORE: 1.67

## 2025-03-13 NOTE — PROGRESS NOTES
Follow up patient note  Interventional spine and sports physiatry, Physical medicine rehabilitation      Chief complaint:   Chief Complaint   Patient presents with    Follow-Up     Back pain          HISTORY    Please see new patient note by Dr Rios,  for more details.     HPI  Patient identification: Shawn Westbrook  1955,   female  With Diagnoses of Lumbar spondylosis, Lumbar radiculopathy, stable, improved after epidural steroid injection, Sacroiliac joint dysfunction of right side, Cervical spondylosis, Cervical radiculopathy, Cervical spinal stenosis, and Myalgia were pertinent to this visit.     Procedures:  2020 C7-T1 interlaminar epidural steroid injection with 100% improvement in pain and function in regards to the neck and arm pain after the injection follow-up visit on 2020 C7-T1 cervical interlaminar epidural steroid injection initially with 60% improvement but this only lasted for a few weeks  11/10/2022 right long digit trigger finger injection 100% relief of triggering  11/10/2022 right carpal tunnel injection 100% pain relief  1/10/2023 C7-T1 cervical interlaminar epidural steroid injection 100% pain relief  2024 C7-T1 left cervical interlaminar epidural steroid injection 90% pain relief postprocedure  2024 bilateral L5-S1 transforaminal epidural steroid injection 80% improvement in radiating pain.  4/15/2024 right carpal tunnel injection  2024 bilateral sacroiliac joint injection 70% improvement in pain in the sacroiliac joint.  Preprocedure pain 6/ 10 NRS postprocedure pain 1/10 NRS dislocation  10/2/2024 bilateral L3-4 transforaminal epidural steroid injection 100% improvement in the radicular component of her pain.  2025 diagnostic medial branch block targeting the bilateral lumbar L2-3 and L3-4 facet joints.  80% pain relief preprocedure pain 6/10 numeric rating scale postprocedure pain 1/10 numeric rating scale.  2025 diagnostic medial  branch block targeting the bilateral lumbar L2-3 and L3-4 facet joints.  80% pain relief preprocedure pain 7/10 numeric rating scale postprocedure pain 1/10 numeric rating scale.      History of Present Illness  The patient is a 68-year-old female here for follow-up.    The patient had excellent improvement with greater than 80% pain relief x 2 after the diagnostic medial branch blocks x 2 as above during the diagnostic phase she had improved walking standing and bending ability.  After the diagnostic phase her pain returned.  Her pain is bilateral axial low back pain aching in quality nonradiating 7 out of 10 in intensity constant worse with standing walking lumbar extension.  She also has chronic bilateral neck pain which is 4 out of 10 intensity this can occasionally radiate down the right arm and sometimes down the left arm.  This is associated with upper cervical headaches        The patient has done a provider driven home exercise program including the past 6 months.     Medications tried include gabapentin which had to be stopped because of side effects.  Patient is on Lyrica now. She is also use meloxicam and Flexeril.           ROS Red Flags :   Fever, Chills, Sweats: Denies  Involuntary Weight Loss: Denies  Bowel/Bladder Incontinence: Denies  Saddle Anesthesia: Denies        PMHx:   Past Medical History:   Diagnosis Date    Chronic low back pain     Hypertension     MVP (mitral valve prolapse)     Pericarditis 2010       PSHx:   Past Surgical History:   Procedure Laterality Date    LUMBAR MEDIAL BRANCH BLOCKS Bilateral 2/11/2025    Procedure: Diagnostic medial branch blocks targeting the BILATERAL L2-3 and L3-4 facet joints with fluoroscopic guidance #2…..Note: Diagnostic medial branch block #2 is only be done if procedure #1 is a positive block.  This will be on a separate date from procedure #1.;  Surgeon: Marco Rios M.D.;  [facility];  Service: Pain Management     LUMBAR MEDIAL BRANCH BLOCKS Bilateral 1/28/2025    Procedure: Diagnostic medial branch blocks targeting the BILATERAL L2-3 and L3-4 facet joints with fluoroscopic guidance #1;  Surgeon: Marco Rios M.D.;  [facility];  Service: Pain Management    DC NJX AA&/STRD TFRML EPI LUMBAR/SACRAL 1 LEVEL Bilateral 10/2/2024    Procedure: BILATERAL L3-4 transforaminal epidural steroid injection with fluoroscopic guidance;  Surgeon: Marco Rios M.D.;  [facility];  Service: Pain Management    DC INJECTION,SACROILIAC JOINT Bilateral 5/14/2024    Procedure: RIGHT and Left sacroiliac joint injection with fluoroscopic guidance;  Surgeon: Marco Rios M.D.;  [facility];  Service: Pain Management    DC NJX AA&/STRD TFRML EPI LUMBAR/SACRAL 1 LEVEL Bilateral 2/13/2024    Procedure: BILATERAL L5-S1 versus L4-5 transforaminal epidural steroid injection with fluoroscopic guidance.    Note: cervical injection approximately 1 month prior to the lumbar;  Surgeon: Marco Rios M.D.;  [facility];  Service: Pain Management    DC INJ CERV/THORAC,W/ IMAGING Left 1/16/2024    Procedure: LEFT cervical C7-T1 interlaminar epidural steroid injection.    Note: cervical approximately 1 month prior to the lumbar;  Surgeon: Marco Rios M.D.;  [facility];  Service: Pain Management    DC INJ CERV/THORAC,W/ IMAGING N/A 1/10/2023    Procedure: C7-T1 interlaminar epidural steroid injection;  Surgeon: Marco Rios M.D.;  [facility];  Service: Pain Management    DC INJ CERV/THORAC,W/ IMAGING N/A 9/27/2022    Procedure: C7-T1 interlaminar epidural steroid injection;  Surgeon: Marco Rios M.D.;  [facility];  Service: Pain Management    ABDOMINAL HYSTERECTOMY TOTAL      cervix remains and one ovary.     ARTHROPLASTY Bilateral     Total  knee replacements    LAMINOTOMY      L4-5, L5-S1 fusion    OTHER ORTHOPEDIC SURGERY      spinal fusion    OTHER ORTHOPEDIC SURGERY      right knee replacement       Family history   Family History   Problem Relation Age of Onset    Heart Disease Mother     Heart Disease Father     Cancer Father     Alcohol abuse Father     Diabetes Sister     Diabetes Sister     Breast Cancer Maternal Grandmother     Dementia Paternal Grandmother          Medications:   Outpatient Medications Marked as Taking for the 3/12/25 encounter (Office Visit) with Marco Rios M.D.   Medication Sig Dispense Refill    lidocaine (LIDODERM) 5 % Patch Place 1 Patch on the skin every 24 hours. Apply for 12 hours maximum and then take off for 12 hours. 30 Patch 5    DULoxetine (CYMBALTA) 30 MG Cap DR Particles Take 1 Capsule by mouth every day. 90 Capsule 1    omeprazole (PRILOSEC) 40 MG delayed-release capsule Take 1 Capsule by mouth every day. 90 Capsule 1    pregabalin (LYRICA) 100 MG Cap 1 tablet p.o. in the morning and 2 tablets p.o. in the evening 270 Capsule 0    simvastatin (ZOCOR) 20 MG Tab TAKE 1 TABLET BY MOUTH EVERY EVENING 90 Tablet 1    meloxicam (MOBIC) 15 MG tablet TAKE 1 TABLET BY MOUTH EVERY DAY WITH FOOD. NO ADVIL/ALEVE/MOTRIN/IBUPROFEN ON THE SAME DAY. 90 Tablet 1    Melatonin 1 MG Cap Take  by mouth.      tretinoin (RETIN-A) 0.025 % cream APPLY PEA SIZED AMOUNT TOPICALLY TO FACE EVERY 3 NIGHTS. INCREASE TO EVERY NIGHT AS IRRITATION ALLOWS          Current Outpatient Medications on File Prior to Visit   Medication Sig Dispense Refill    DULoxetine (CYMBALTA) 30 MG Cap DR Particles Take 1 Capsule by mouth every day. 90 Capsule 1    omeprazole (PRILOSEC) 40 MG delayed-release capsule Take 1 Capsule by mouth every day. 90 Capsule 1    pregabalin (LYRICA) 100 MG Cap 1 tablet p.o. in the morning and 2 tablets p.o. in the evening 270 Capsule 0    simvastatin (ZOCOR) 20 MG Tab TAKE 1 TABLET BY MOUTH EVERY EVENING 90 Tablet 1     meloxicam (MOBIC) 15 MG tablet TAKE 1 TABLET BY MOUTH EVERY DAY WITH FOOD. NO ADVIL/ALEVE/MOTRIN/IBUPROFEN ON THE SAME DAY. 90 Tablet 1    Melatonin 1 MG Cap Take  by mouth.      tretinoin (RETIN-A) 0.025 % cream APPLY PEA SIZED AMOUNT TOPICALLY TO FACE EVERY 3 NIGHTS. INCREASE TO EVERY NIGHT AS IRRITATION ALLOWS      Semaglutide, 1 MG/DOSE, 4 MG/3ML Solution Pen-injector Inject  under the skin.       No current facility-administered medications on file prior to visit.         Allergies:   No Known Allergies      Social Hx:   Social History     Socioeconomic History    Marital status:      Spouse name: Not on file    Number of children: Not on file    Years of education: Not on file    Highest education level: Not on file   Occupational History     Employer: OTHER     Comment: retired   Tobacco Use    Smoking status: Former     Current packs/day: 0.00     Types: Cigarettes     Start date: 1980     Quit date: 1988     Years since quittin.8     Passive exposure: Past    Smokeless tobacco: Never   Vaping Use    Vaping status: Never Used   Substance and Sexual Activity    Alcohol use: Yes     Alcohol/week: 0.0 oz     Comment: rare    Drug use: No    Sexual activity: Not Currently     Comment:    Other Topics Concern     Service No    Blood Transfusions No    Caffeine Concern No    Occupational Exposure No    Hobby Hazards No    Sleep Concern Yes     Comment: due to pain    Stress Concern Yes    Weight Concern No    Special Diet No    Back Care No    Exercise Yes    Bike Helmet No    Seat Belt Yes    Self-Exams Yes   Social History Narrative    Not on file     Social Drivers of Health     Financial Resource Strain: Not on file   Food Insecurity: Not on file   Transportation Needs: Not on file   Physical Activity: Not on file   Stress: Not on file   Social Connections: Not on file   Intimate Partner Violence: Not on file   Housing Stability: Not on file         EXAMINATION     Physical  "Exam:   Vitals: /71 (BP Location: Right arm, Patient Position: Sitting, BP Cuff Size: Adult)   Pulse 76   Temp 36.8 °C (98.2 °F) (Temporal)   Ht 1.6 m (5' 3\")   Wt 64.4 kg (142 lb)   SpO2 95%     Constitutional:   Body Habitus: Body mass index is 25.15 kg/m².  Cooperation: Fully cooperates with exam  Appearance: Well-groomed no disheveled    Respiratory-  breathing comfortable on room air, no audible wheezing  Cardiovascular- capillary refills less than 2 seconds. No lower extremity edema is noted.   Psychiatric- alert and oriented ×3. Normal affect.    MSK and Neuro: -    Physical Exam        Thoracic/Lumbar Spine/Sacral Spine/Hips   There are no signs of infection around the injection sites.   decreased active range of motion with flexion, lateral flexion, and rotation bilaterally.   There is decreased active range of motion with lumbar extension.    There is  pain with lumbar extension.   There is pain with facet loading maneuver (extension rotation) with axial low back pain on the BILATERAL side(s) L2-3 and L3-4      Palpation:   No tenderness to palpation in midline at T1-T12 levels. No tenderness to palpation in the left and right of the midline T1-L5, NEGATIVE for tenderness to palpation to the para-midline region in the lower lumbar levels.  palpation over SI joint: negative bilaterally    palpation in hip or over the gluteus medius tendon insertion: negative bilaterally      Lumbar spine Special tests  Neuro tension  Straight leg test negative bilaterally    Slump test negative bilaterally      Key points for the international standards for neurological classification of spinal cord injury (ISNCSCI) to light touch.     Dermatome R L                                      L2 2 2   L3 2 2   L4 1 1   L5 1 1   S1 1 1   S2 2 2     Numbness consistent with peripheral polyneuropathy.    Motor Exam Lower Extremities    ? Myotome R L   Hip flexion L2 5 5   Knee extension L3 5 5   Ankle dorsiflexion L4 5 5 " "  Toe extension L5 5 5   Ankle plantarflexion S1 5 5             MEDICAL DECISION MAKING    DATA    Labs:   Lab Results   Component Value Date/Time    SODIUM 140 07/05/2024 06:58 AM    POTASSIUM 3.9 07/05/2024 06:58 AM    CHLORIDE 104 07/05/2024 06:58 AM    CO2 24 07/05/2024 06:58 AM    GLUCOSE 94 07/05/2024 06:58 AM    BUN 20 07/05/2024 06:58 AM    CREATININE 0.66 07/05/2024 06:58 AM        No results found for: \"PROTHROMBTM\", \"INR\"     Lab Results   Component Value Date/Time    WBC 5.7 07/05/2024 06:58 AM    RBC 4.49 07/05/2024 06:58 AM    HEMOGLOBIN 13.7 07/05/2024 06:58 AM    HEMATOCRIT 42.3 07/05/2024 06:58 AM    MCV 94.2 07/05/2024 06:58 AM    MCH 30.5 07/05/2024 06:58 AM    MCHC 32.4 07/05/2024 06:58 AM    MPV 12.7 07/05/2024 06:58 AM    NEUTSPOLYS 61.20 07/05/2024 06:58 AM    LYMPHOCYTES 27.00 07/05/2024 06:58 AM    MONOCYTES 8.40 07/05/2024 06:58 AM    EOSINOPHILS 2.10 07/05/2024 06:58 AM    BASOPHILS 1.10 07/05/2024 06:58 AM        Lab Results   Component Value Date/Time    HBA1C 5.6 06/25/2014 11:27 AM          Imaging:   I personally reviewed following images    MRI cervical spine 6/ 17/2020  There is mild to moderate central canal stenosis at C5-6 with severe left and moderate right neuroforaminal stenosis.  There is mild central canal stenosis at C6-7 with moderate bilateral neuroforaminal stenosis.  There is facet arthropathy right at C2-3.  Also facet arthropathy right worse than left at C3-4, C4-5, C5-6.    4/15/2024 XR hip  Mild degenerative changes in the hip.    I reviewed the following radiology reports                                 Xr HIP 4/15/2024  IMPRESSION:   1.  No RIGHT hip or pelvic fracture.  2.  Mild degenerative change of both hips.  3.  Degenerative and postoperative changes of lower lumbar spine.    MRI lumbar spine 5/3/2022        Results for orders placed during the hospital encounter of 06/17/20   MR-CERVICAL SPINE-W/O    Impression 1.  Mild spinal canal narrowing at C5-6 and " C6-7    2.  Foraminal stenoses at C5-6 and C6-7    3.  Multilevel disc bulge, endplate spurring, and facet arthropathy                     Results for orders placed during the hospital encounter of 20   DX-CERVICAL SPINE-WITH FLEX-EXT   7+    Impression 1.  No compression deformity or acute fracture.    2.  There is degenerative disc disease and facet arthropathy with bilateral osseous neural foraminal narrowing.    3.  No focal instability is noted on flexion extension views.                            ELECTRODIAGNOSTICS  EMG/nerve conduction study performed on  22  This is an abnormal study.  There is electrophysiologic evidence of mild right median neuropathy at the wrist (carpal tunnel syndrome).  There is no evidence of right cervical (C5-T1) radiculopathy.  Clinical correlation is recommended.                                                               DIAGNOSIS   Visit Diagnoses     ICD-10-CM   1. Lumbar spondylosis  M47.816   2. Lumbar radiculopathy, stable, improved after epidural steroid injection  M54.16   3. Sacroiliac joint dysfunction of right side  M53.3   4. Cervical spondylosis  M47.812   5. Cervical radiculopathy  M54.12   6. Cervical spinal stenosis  M48.02   7. Myalgia  M79.10         ASSESSMENT and PLAN:     Shawn Westbrook  1955,   female      Shawn was seen today for follow-up.    Diagnoses and all orders for this visit:    Lumbar spondylosis  -     Referral to Physical Medicine Rehab; Future  -     lidocaine (LIDODERM) 5 % Patch; Place 1 Patch on the skin every 24 hours. Apply for 12 hours maximum and then take off for 12 hours.    Lumbar radiculopathy, stable, improved after epidural steroid injection  -     lidocaine (LIDODERM) 5 % Patch; Place 1 Patch on the skin every 24 hours. Apply for 12 hours maximum and then take off for 12 hours.    Sacroiliac joint dysfunction of right side  -     lidocaine (LIDODERM) 5 % Patch; Place 1 Patch on the skin every 24 hours.  Apply for 12 hours maximum and then take off for 12 hours.    Cervical spondylosis  -     lidocaine (LIDODERM) 5 % Patch; Place 1 Patch on the skin every 24 hours. Apply for 12 hours maximum and then take off for 12 hours.    Cervical radiculopathy  -     lidocaine (LIDODERM) 5 % Patch; Place 1 Patch on the skin every 24 hours. Apply for 12 hours maximum and then take off for 12 hours.    Cervical spinal stenosis  -     lidocaine (LIDODERM) 5 % Patch; Place 1 Patch on the skin every 24 hours. Apply for 12 hours maximum and then take off for 12 hours.    Myalgia  -     lidocaine (LIDODERM) 5 % Patch; Place 1 Patch on the skin every 24 hours. Apply for 12 hours maximum and then take off for 12 hours.      Assessment & Plan        Status post diagnostic medial branch blocks x2 targeting the BILATERAL L2-3 and L3-4 facet joints with greater than 80% pain relief during the diagnostic phase.  This represents a positive diagnostic block.    I believe the patient is a good candidate for BILATERAL radiofrequency neurotomy targeting the medial branches innervating the L2-3 and L3-4 facet joints    The risks benefits and alternatives to this procedure were discussed and the patient wishes to proceed with the procedure. Risks include but are not limited to damage to surrounding structures, infection, bleeding, worsening of pain which can be permanent, weakness which can be permanent. Benefits include pain relief, improved function. Alternatives includes not doing the procedure.       Medications: Continue meloxicam.  Continue Lyrica.  I also agree with Cymbalta which can be helpful for pain and mood.  Continue medications through PCP.    The patient reports she had improvement with greater occipital nerve blocks we will consider the use of the follow-up visit.    Follow up: After the above procedures    Thank you for allowing me to participate in the care of this patient. If you have any questions please not hesitate to contact  me.             Please note that this dictation was created using voice recognition software. I have made every reasonable attempt to correct obvious errors but there may be errors of grammar and content that I may have overlooked prior to finalization of this note.      Marco Rios MD  Interventional Spine and Sports Physiatry  Physical Medicine and Rehabilitation  [facility]

## 2025-03-13 NOTE — H&P (VIEW-ONLY)
Follow up patient note  Interventional spine and sports physiatry, Physical medicine rehabilitation      Chief complaint:   Chief Complaint   Patient presents with    Follow-Up     Back pain          HISTORY    Please see new patient note by Dr Rios,  for more details.     HPI  Patient identification: Shawn Westbrook  1955,   female  With Diagnoses of Lumbar spondylosis, Lumbar radiculopathy, stable, improved after epidural steroid injection, Sacroiliac joint dysfunction of right side, Cervical spondylosis, Cervical radiculopathy, Cervical spinal stenosis, and Myalgia were pertinent to this visit.     Procedures:  2020 C7-T1 interlaminar epidural steroid injection with 100% improvement in pain and function in regards to the neck and arm pain after the injection follow-up visit on 2020 C7-T1 cervical interlaminar epidural steroid injection initially with 60% improvement but this only lasted for a few weeks  11/10/2022 right long digit trigger finger injection 100% relief of triggering  11/10/2022 right carpal tunnel injection 100% pain relief  1/10/2023 C7-T1 cervical interlaminar epidural steroid injection 100% pain relief  2024 C7-T1 left cervical interlaminar epidural steroid injection 90% pain relief postprocedure  2024 bilateral L5-S1 transforaminal epidural steroid injection 80% improvement in radiating pain.  4/15/2024 right carpal tunnel injection  2024 bilateral sacroiliac joint injection 70% improvement in pain in the sacroiliac joint.  Preprocedure pain 6/ 10 NRS postprocedure pain 1/10 NRS dislocation  10/2/2024 bilateral L3-4 transforaminal epidural steroid injection 100% improvement in the radicular component of her pain.  2025 diagnostic medial branch block targeting the bilateral lumbar L2-3 and L3-4 facet joints.  80% pain relief preprocedure pain 6/10 numeric rating scale postprocedure pain 1/10 numeric rating scale.  2025 diagnostic medial  branch block targeting the bilateral lumbar L2-3 and L3-4 facet joints.  80% pain relief preprocedure pain 7/10 numeric rating scale postprocedure pain 1/10 numeric rating scale.      History of Present Illness  The patient is a 68-year-old female here for follow-up.    The patient had excellent improvement with greater than 80% pain relief x 2 after the diagnostic medial branch blocks x 2 as above during the diagnostic phase she had improved walking standing and bending ability.  After the diagnostic phase her pain returned.  Her pain is bilateral axial low back pain aching in quality nonradiating 7 out of 10 in intensity constant worse with standing walking lumbar extension.  She also has chronic bilateral neck pain which is 4 out of 10 intensity this can occasionally radiate down the right arm and sometimes down the left arm.  This is associated with upper cervical headaches        The patient has done a provider driven home exercise program including the past 6 months.     Medications tried include gabapentin which had to be stopped because of side effects.  Patient is on Lyrica now. She is also use meloxicam and Flexeril.           ROS Red Flags :   Fever, Chills, Sweats: Denies  Involuntary Weight Loss: Denies  Bowel/Bladder Incontinence: Denies  Saddle Anesthesia: Denies        PMHx:   Past Medical History:   Diagnosis Date    Chronic low back pain     Hypertension     MVP (mitral valve prolapse)     Pericarditis 2010       PSHx:   Past Surgical History:   Procedure Laterality Date    LUMBAR MEDIAL BRANCH BLOCKS Bilateral 2/11/2025    Procedure: Diagnostic medial branch blocks targeting the BILATERAL L2-3 and L3-4 facet joints with fluoroscopic guidance #2…..Note: Diagnostic medial branch block #2 is only be done if procedure #1 is a positive block.  This will be on a separate date from procedure #1.;  Surgeon: Marco iRos M.D.;  [facility];  Service: Pain Management     LUMBAR MEDIAL BRANCH BLOCKS Bilateral 1/28/2025    Procedure: Diagnostic medial branch blocks targeting the BILATERAL L2-3 and L3-4 facet joints with fluoroscopic guidance #1;  Surgeon: Marco Rios M.D.;  [facility];  Service: Pain Management    NC NJX AA&/STRD TFRML EPI LUMBAR/SACRAL 1 LEVEL Bilateral 10/2/2024    Procedure: BILATERAL L3-4 transforaminal epidural steroid injection with fluoroscopic guidance;  Surgeon: Marco Rios M.D.;  [facility];  Service: Pain Management    NC INJECTION,SACROILIAC JOINT Bilateral 5/14/2024    Procedure: RIGHT and Left sacroiliac joint injection with fluoroscopic guidance;  Surgeon: Marco Rios M.D.;  [facility];  Service: Pain Management    NC NJX AA&/STRD TFRML EPI LUMBAR/SACRAL 1 LEVEL Bilateral 2/13/2024    Procedure: BILATERAL L5-S1 versus L4-5 transforaminal epidural steroid injection with fluoroscopic guidance.    Note: cervical injection approximately 1 month prior to the lumbar;  Surgeon: Marco Rios M.D.;  [facility];  Service: Pain Management    NC INJ CERV/THORAC,W/ IMAGING Left 1/16/2024    Procedure: LEFT cervical C7-T1 interlaminar epidural steroid injection.    Note: cervical approximately 1 month prior to the lumbar;  Surgeon: Marco Rios M.D.;  [facility];  Service: Pain Management    NC INJ CERV/THORAC,W/ IMAGING N/A 1/10/2023    Procedure: C7-T1 interlaminar epidural steroid injection;  Surgeon: Marco Rios M.D.;  [facility];  Service: Pain Management    NC INJ CERV/THORAC,W/ IMAGING N/A 9/27/2022    Procedure: C7-T1 interlaminar epidural steroid injection;  Surgeon: Marco Rios M.D.;  [facility];  Service: Pain Management    ABDOMINAL HYSTERECTOMY TOTAL      cervix remains and one ovary.     ARTHROPLASTY Bilateral     Total  knee replacements    LAMINOTOMY      L4-5, L5-S1 fusion    OTHER ORTHOPEDIC SURGERY      spinal fusion    OTHER ORTHOPEDIC SURGERY      right knee replacement       Family history   Family History   Problem Relation Age of Onset    Heart Disease Mother     Heart Disease Father     Cancer Father     Alcohol abuse Father     Diabetes Sister     Diabetes Sister     Breast Cancer Maternal Grandmother     Dementia Paternal Grandmother          Medications:   Outpatient Medications Marked as Taking for the 3/12/25 encounter (Office Visit) with Marco Rios M.D.   Medication Sig Dispense Refill    lidocaine (LIDODERM) 5 % Patch Place 1 Patch on the skin every 24 hours. Apply for 12 hours maximum and then take off for 12 hours. 30 Patch 5    DULoxetine (CYMBALTA) 30 MG Cap DR Particles Take 1 Capsule by mouth every day. 90 Capsule 1    omeprazole (PRILOSEC) 40 MG delayed-release capsule Take 1 Capsule by mouth every day. 90 Capsule 1    pregabalin (LYRICA) 100 MG Cap 1 tablet p.o. in the morning and 2 tablets p.o. in the evening 270 Capsule 0    simvastatin (ZOCOR) 20 MG Tab TAKE 1 TABLET BY MOUTH EVERY EVENING 90 Tablet 1    meloxicam (MOBIC) 15 MG tablet TAKE 1 TABLET BY MOUTH EVERY DAY WITH FOOD. NO ADVIL/ALEVE/MOTRIN/IBUPROFEN ON THE SAME DAY. 90 Tablet 1    Melatonin 1 MG Cap Take  by mouth.      tretinoin (RETIN-A) 0.025 % cream APPLY PEA SIZED AMOUNT TOPICALLY TO FACE EVERY 3 NIGHTS. INCREASE TO EVERY NIGHT AS IRRITATION ALLOWS          Current Outpatient Medications on File Prior to Visit   Medication Sig Dispense Refill    DULoxetine (CYMBALTA) 30 MG Cap DR Particles Take 1 Capsule by mouth every day. 90 Capsule 1    omeprazole (PRILOSEC) 40 MG delayed-release capsule Take 1 Capsule by mouth every day. 90 Capsule 1    pregabalin (LYRICA) 100 MG Cap 1 tablet p.o. in the morning and 2 tablets p.o. in the evening 270 Capsule 0    simvastatin (ZOCOR) 20 MG Tab TAKE 1 TABLET BY MOUTH EVERY EVENING 90 Tablet 1     meloxicam (MOBIC) 15 MG tablet TAKE 1 TABLET BY MOUTH EVERY DAY WITH FOOD. NO ADVIL/ALEVE/MOTRIN/IBUPROFEN ON THE SAME DAY. 90 Tablet 1    Melatonin 1 MG Cap Take  by mouth.      tretinoin (RETIN-A) 0.025 % cream APPLY PEA SIZED AMOUNT TOPICALLY TO FACE EVERY 3 NIGHTS. INCREASE TO EVERY NIGHT AS IRRITATION ALLOWS      Semaglutide, 1 MG/DOSE, 4 MG/3ML Solution Pen-injector Inject  under the skin.       No current facility-administered medications on file prior to visit.         Allergies:   No Known Allergies      Social Hx:   Social History     Socioeconomic History    Marital status:      Spouse name: Not on file    Number of children: Not on file    Years of education: Not on file    Highest education level: Not on file   Occupational History     Employer: OTHER     Comment: retired   Tobacco Use    Smoking status: Former     Current packs/day: 0.00     Types: Cigarettes     Start date: 1980     Quit date: 1988     Years since quittin.8     Passive exposure: Past    Smokeless tobacco: Never   Vaping Use    Vaping status: Never Used   Substance and Sexual Activity    Alcohol use: Yes     Alcohol/week: 0.0 oz     Comment: rare    Drug use: No    Sexual activity: Not Currently     Comment:    Other Topics Concern     Service No    Blood Transfusions No    Caffeine Concern No    Occupational Exposure No    Hobby Hazards No    Sleep Concern Yes     Comment: due to pain    Stress Concern Yes    Weight Concern No    Special Diet No    Back Care No    Exercise Yes    Bike Helmet No    Seat Belt Yes    Self-Exams Yes   Social History Narrative    Not on file     Social Drivers of Health     Financial Resource Strain: Not on file   Food Insecurity: Not on file   Transportation Needs: Not on file   Physical Activity: Not on file   Stress: Not on file   Social Connections: Not on file   Intimate Partner Violence: Not on file   Housing Stability: Not on file         EXAMINATION     Physical  "Exam:   Vitals: /71 (BP Location: Right arm, Patient Position: Sitting, BP Cuff Size: Adult)   Pulse 76   Temp 36.8 °C (98.2 °F) (Temporal)   Ht 1.6 m (5' 3\")   Wt 64.4 kg (142 lb)   SpO2 95%     Constitutional:   Body Habitus: Body mass index is 25.15 kg/m².  Cooperation: Fully cooperates with exam  Appearance: Well-groomed no disheveled    Respiratory-  breathing comfortable on room air, no audible wheezing  Cardiovascular- capillary refills less than 2 seconds. No lower extremity edema is noted.   Psychiatric- alert and oriented ×3. Normal affect.    MSK and Neuro: -    Physical Exam        Thoracic/Lumbar Spine/Sacral Spine/Hips   There are no signs of infection around the injection sites.   decreased active range of motion with flexion, lateral flexion, and rotation bilaterally.   There is decreased active range of motion with lumbar extension.    There is  pain with lumbar extension.   There is pain with facet loading maneuver (extension rotation) with axial low back pain on the BILATERAL side(s) L2-3 and L3-4      Palpation:   No tenderness to palpation in midline at T1-T12 levels. No tenderness to palpation in the left and right of the midline T1-L5, NEGATIVE for tenderness to palpation to the para-midline region in the lower lumbar levels.  palpation over SI joint: negative bilaterally    palpation in hip or over the gluteus medius tendon insertion: negative bilaterally      Lumbar spine Special tests  Neuro tension  Straight leg test negative bilaterally    Slump test negative bilaterally      Key points for the international standards for neurological classification of spinal cord injury (ISNCSCI) to light touch.     Dermatome R L                                      L2 2 2   L3 2 2   L4 1 1   L5 1 1   S1 1 1   S2 2 2     Numbness consistent with peripheral polyneuropathy.    Motor Exam Lower Extremities    ? Myotome R L   Hip flexion L2 5 5   Knee extension L3 5 5   Ankle dorsiflexion L4 5 5 " "  Toe extension L5 5 5   Ankle plantarflexion S1 5 5             MEDICAL DECISION MAKING    DATA    Labs:   Lab Results   Component Value Date/Time    SODIUM 140 07/05/2024 06:58 AM    POTASSIUM 3.9 07/05/2024 06:58 AM    CHLORIDE 104 07/05/2024 06:58 AM    CO2 24 07/05/2024 06:58 AM    GLUCOSE 94 07/05/2024 06:58 AM    BUN 20 07/05/2024 06:58 AM    CREATININE 0.66 07/05/2024 06:58 AM        No results found for: \"PROTHROMBTM\", \"INR\"     Lab Results   Component Value Date/Time    WBC 5.7 07/05/2024 06:58 AM    RBC 4.49 07/05/2024 06:58 AM    HEMOGLOBIN 13.7 07/05/2024 06:58 AM    HEMATOCRIT 42.3 07/05/2024 06:58 AM    MCV 94.2 07/05/2024 06:58 AM    MCH 30.5 07/05/2024 06:58 AM    MCHC 32.4 07/05/2024 06:58 AM    MPV 12.7 07/05/2024 06:58 AM    NEUTSPOLYS 61.20 07/05/2024 06:58 AM    LYMPHOCYTES 27.00 07/05/2024 06:58 AM    MONOCYTES 8.40 07/05/2024 06:58 AM    EOSINOPHILS 2.10 07/05/2024 06:58 AM    BASOPHILS 1.10 07/05/2024 06:58 AM        Lab Results   Component Value Date/Time    HBA1C 5.6 06/25/2014 11:27 AM          Imaging:   I personally reviewed following images    MRI cervical spine 6/ 17/2020  There is mild to moderate central canal stenosis at C5-6 with severe left and moderate right neuroforaminal stenosis.  There is mild central canal stenosis at C6-7 with moderate bilateral neuroforaminal stenosis.  There is facet arthropathy right at C2-3.  Also facet arthropathy right worse than left at C3-4, C4-5, C5-6.    4/15/2024 XR hip  Mild degenerative changes in the hip.    I reviewed the following radiology reports                                 Xr HIP 4/15/2024  IMPRESSION:   1.  No RIGHT hip or pelvic fracture.  2.  Mild degenerative change of both hips.  3.  Degenerative and postoperative changes of lower lumbar spine.    MRI lumbar spine 5/3/2022        Results for orders placed during the hospital encounter of 06/17/20   MR-CERVICAL SPINE-W/O    Impression 1.  Mild spinal canal narrowing at C5-6 and " C6-7    2.  Foraminal stenoses at C5-6 and C6-7    3.  Multilevel disc bulge, endplate spurring, and facet arthropathy                     Results for orders placed during the hospital encounter of 20   DX-CERVICAL SPINE-WITH FLEX-EXT   7+    Impression 1.  No compression deformity or acute fracture.    2.  There is degenerative disc disease and facet arthropathy with bilateral osseous neural foraminal narrowing.    3.  No focal instability is noted on flexion extension views.                            ELECTRODIAGNOSTICS  EMG/nerve conduction study performed on  22  This is an abnormal study.  There is electrophysiologic evidence of mild right median neuropathy at the wrist (carpal tunnel syndrome).  There is no evidence of right cervical (C5-T1) radiculopathy.  Clinical correlation is recommended.                                                               DIAGNOSIS   Visit Diagnoses     ICD-10-CM   1. Lumbar spondylosis  M47.816   2. Lumbar radiculopathy, stable, improved after epidural steroid injection  M54.16   3. Sacroiliac joint dysfunction of right side  M53.3   4. Cervical spondylosis  M47.812   5. Cervical radiculopathy  M54.12   6. Cervical spinal stenosis  M48.02   7. Myalgia  M79.10         ASSESSMENT and PLAN:     Shawn Westbrook  1955,   female      Shawn was seen today for follow-up.    Diagnoses and all orders for this visit:    Lumbar spondylosis  -     Referral to Physical Medicine Rehab; Future  -     lidocaine (LIDODERM) 5 % Patch; Place 1 Patch on the skin every 24 hours. Apply for 12 hours maximum and then take off for 12 hours.    Lumbar radiculopathy, stable, improved after epidural steroid injection  -     lidocaine (LIDODERM) 5 % Patch; Place 1 Patch on the skin every 24 hours. Apply for 12 hours maximum and then take off for 12 hours.    Sacroiliac joint dysfunction of right side  -     lidocaine (LIDODERM) 5 % Patch; Place 1 Patch on the skin every 24 hours.  Apply for 12 hours maximum and then take off for 12 hours.    Cervical spondylosis  -     lidocaine (LIDODERM) 5 % Patch; Place 1 Patch on the skin every 24 hours. Apply for 12 hours maximum and then take off for 12 hours.    Cervical radiculopathy  -     lidocaine (LIDODERM) 5 % Patch; Place 1 Patch on the skin every 24 hours. Apply for 12 hours maximum and then take off for 12 hours.    Cervical spinal stenosis  -     lidocaine (LIDODERM) 5 % Patch; Place 1 Patch on the skin every 24 hours. Apply for 12 hours maximum and then take off for 12 hours.    Myalgia  -     lidocaine (LIDODERM) 5 % Patch; Place 1 Patch on the skin every 24 hours. Apply for 12 hours maximum and then take off for 12 hours.      Assessment & Plan        Status post diagnostic medial branch blocks x2 targeting the BILATERAL L2-3 and L3-4 facet joints with greater than 80% pain relief during the diagnostic phase.  This represents a positive diagnostic block.    I believe the patient is a good candidate for BILATERAL radiofrequency neurotomy targeting the medial branches innervating the L2-3 and L3-4 facet joints    The risks benefits and alternatives to this procedure were discussed and the patient wishes to proceed with the procedure. Risks include but are not limited to damage to surrounding structures, infection, bleeding, worsening of pain which can be permanent, weakness which can be permanent. Benefits include pain relief, improved function. Alternatives includes not doing the procedure.       Medications: Continue meloxicam.  Continue Lyrica.  I also agree with Cymbalta which can be helpful for pain and mood.  Continue medications through PCP.    The patient reports she had improvement with greater occipital nerve blocks we will consider the use of the follow-up visit.    Follow up: After the above procedures    Thank you for allowing me to participate in the care of this patient. If you have any questions please not hesitate to contact  me.             Please note that this dictation was created using voice recognition software. I have made every reasonable attempt to correct obvious errors but there may be errors of grammar and content that I may have overlooked prior to finalization of this note.      Marco Rios MD  Interventional Spine and Sports Physiatry  Physical Medicine and Rehabilitation  [facility]

## 2025-03-20 PROBLEM — T88.4XXA DIFFICULT INTUBATION: Status: ACTIVE | Noted: 2025-03-20

## 2025-03-20 NOTE — Clinical Note
REFERRAL APPROVAL NOTICE         Sent on March 20, 2025                   Shawn Westbrook  [address] [place] [address]                   Dear Ms. Westbrook,    After a careful review of the medical information and benefit coverage, [facility] has processed your referral. See below for additional details.    If applicable, you must be actively enrolled with your insurance for coverage of the authorized service. If you have any questions regarding your coverage, please contact your insurance directly.    REFERRAL INFORMATION   Referral #:  [id number]  Referred-To Department    Referred-By Provider:  Physical Medicine and Rehab    Marco Rios M.D.   Pain Management       [address]  [address]  [facility] [id number] [phone] [facility] [address]  [phone]    Referral Start Date:  03/18/2025  Referral End Date:   07/16/2025             SCHEDULING  If you do not already have an appointment, please call [phone] to make an appointment.     MORE INFORMATION  If you do not already have a [employer] account, sign up at: [email or web address]  You can access your medical information, make appointments, see lab results, billing information, and more.  If you have questions regarding this referral, please contact  the [facility] Referrals department at:             [phone]. Monday - Friday 8:00AM - 5:00PM.     Sincerely,    [facility]

## 2025-04-08 ENCOUNTER — HOSPITAL ENCOUNTER (OUTPATIENT)
Facility: REHABILITATION | Age: 70
End: 2025-04-08
Attending: PHYSICAL MEDICINE & REHABILITATION | Admitting: PHYSICAL MEDICINE & REHABILITATION
Payer: MEDICARE

## 2025-04-08 ENCOUNTER — APPOINTMENT (OUTPATIENT)
Dept: RADIOLOGY | Facility: REHABILITATION | Age: 70
End: 2025-04-08
Attending: PHYSICAL MEDICINE & REHABILITATION
Payer: MEDICARE

## 2025-04-08 VITALS
OXYGEN SATURATION: 99 % | BODY MASS INDEX: 24.57 KG/M2 | HEIGHT: 63 IN | RESPIRATION RATE: 18 BRPM | HEART RATE: 70 BPM | TEMPERATURE: 97.9 F | SYSTOLIC BLOOD PRESSURE: 124 MMHG | DIASTOLIC BLOOD PRESSURE: 85 MMHG | WEIGHT: 138.67 LBS

## 2025-04-08 PROCEDURE — 64635 DESTROY LUMB/SAC FACET JNT: CPT

## 2025-04-08 PROCEDURE — 700111 HCHG RX REV CODE 636 W/ 250 OVERRIDE (IP): Mod: JZ

## 2025-04-08 PROCEDURE — 64636 DESTROY L/S FACET JNT ADDL: CPT

## 2025-04-08 PROCEDURE — 99152 MOD SED SAME PHYS/QHP 5/>YRS: CPT

## 2025-04-08 PROCEDURE — 99153 MOD SED SAME PHYS/QHP EA: CPT

## 2025-04-08 RX ORDER — ROPIVACAINE HYDROCHLORIDE 5 MG/ML
INJECTION, SOLUTION EPIDURAL; INFILTRATION; PERINEURAL
Status: COMPLETED
Start: 2025-04-08 | End: 2025-04-08

## 2025-04-08 RX ORDER — LIDOCAINE HYDROCHLORIDE 10 MG/ML
INJECTION, SOLUTION EPIDURAL; INFILTRATION; INTRACAUDAL; PERINEURAL
Status: COMPLETED
Start: 2025-04-08 | End: 2025-04-08

## 2025-04-08 RX ORDER — MIDAZOLAM HYDROCHLORIDE 1 MG/ML
INJECTION INTRAMUSCULAR; INTRAVENOUS
Status: COMPLETED
Start: 2025-04-08 | End: 2025-04-08

## 2025-04-08 RX ADMIN — MIDAZOLAM HYDROCHLORIDE 0.5 MG: 1 INJECTION, SOLUTION INTRAMUSCULAR; INTRAVENOUS at 10:29

## 2025-04-08 RX ADMIN — ROPIVACAINE HYDROCHLORIDE 20 ML: 5 INJECTION, SOLUTION EPIDURAL; INFILTRATION; PERINEURAL at 10:36

## 2025-04-08 RX ADMIN — LIDOCAINE HYDROCHLORIDE 30 ML: 10 INJECTION, SOLUTION EPIDURAL; INFILTRATION; INTRACAUDAL; PERINEURAL at 10:36

## 2025-04-08 RX ADMIN — FENTANYL CITRATE 25 MCG: 50 INJECTION, SOLUTION INTRAMUSCULAR; INTRAVENOUS at 10:29

## 2025-04-08 ASSESSMENT — FIBROSIS 4 INDEX: FIB4 SCORE: 1.67

## 2025-04-08 ASSESSMENT — PAIN DESCRIPTION - PAIN TYPE
TYPE: CHRONIC PAIN
TYPE: CHRONIC PAIN

## 2025-04-08 NOTE — OP REPORT
Patient: Shawn Westbrook 69 y.o. female MRN: [id number]     Date of Service: 4/8/2025     Physician/s: Marco Rios MD    Pre-operative Diagnosis: Lumbar spondylosis, facet arthropathy.      Post-operative Diagnosis: Lumbar spondylosis, facet arthropathy.     Procedure: Medial Branch Radiofrequency neurotomy targeting the bilateral lumbar L2-3 and L3-4 facet joint(s) with sedation.     Description of procedure:    The patient was not treated for radiculopathy at this time    The risks, benefits, and alternatives of the procedure were reviewed and discussed with the patient.  Written informed consent was freely obtained. A pre-procedural time-out was conducted by the physician verifying patient’s identity, procedure to be performed, procedure site and side, and allergy verification. Appropriate equipment was determined to be in place for the procedure.     Moderation sedation was achieved with Versed (0.5mg) and Fentanyl (25mcg). Monitoring of the patients vital signs and respiratory status was provided by trained independent registered nurse during the entire course of the procedures and under my supervision and recoded in the patient’s medical record. The duration of sedation was over 10 minutes.     The patient's vital signs were carefully monitored before, throughout, and after the procedure.     In the fluoroscopy suite the patient was placed in a prone position, and a pillow was placed underneath the level of the umbilicus. The skin was prepped and draped in the usual sterile fashion. The fluoroscope was placed over the low back at the appropriate angles, and the targets for needle/probe placement were marked. A 25g needle was placed into each of the markings at three levels, and approx 1cc of 1% Lidocaine was injected subcutaneously into the epidermal and dermal layers. The needle was removed.     A 18 guage, 14.5 cm RFN cannula with a 10 mm active tip was then placed into the skin using fluoroscopic  guidance and advanced with an oblique view towards the intersection of the transverse process and superior articular process L2-3 facet joint where the L1 medial branch runs  levels on the right side, where the medial branch runs. The needle/probe tips were then verified by AP, oblique, and lateral views.     A 18 guage, 14.5 cm RFN cannula with a 10 mm active tip was then placed into the skin using fluoroscopic guidance and advanced with an oblique view towards the intersection of the transverse process and superior articular process L3-4 facet joint where the L2 medial branch runs levels on the right side, where the medial branch runs. The needle/probe tips were then verified by AP, oblique, and lateral views.     Then A 18 guage, 14.5 cm RFN cannula with a 10 mm active tip was then placed into the skin using fluoroscopic guidance and advanced with an oblique view towards the intersection of the transverse process and superior articular process L4-5 facet joint where the L3 medial branch runs  levels on the right side, where the medial branch runs. The needle/probe tips were then verified by AP, oblique, and lateral views.       Motor stimulation is used as an extra precaution to ensure the needle tips are off the lumbar nerve roots prior to each lesion. Following negative aspiration, a syringe was prepared with 10 mL of 1% lidocaine.  2mL of this syringe was injected at each level.  The needles are not moved, but fluoroscope guidance is used to ensure the needles have not moved. After a wait period of approximately 2 minutes, a radiofrequency lesion was then created at each level with a temperature of 80 degrees centigrade for 90 seconds.     The probes were adjusted to a 2nd location and images were saved in 2+ views views. Motor testing was done which confirmed no twitching in the leg.  And another radiofrequency lesion was made of 80 °C for 90 seconds. A 2nd radiofrequency lesion was made with 80°C for 90  seconds.      The cannulas were restyletted, and were then removed intact.     A 18 guage, 14.5 cm RFN cannula with a 10 mm active tip was then placed into the skin using fluoroscopic guidance and advanced with an oblique view towards the intersection of the transverse process and superior articular process L2-3 facet joint where the L1 medial branch runs  levels on the left side, where the medial branch runs. The needle/probe tips were then verified by AP, oblique, and lateral views.     A 18 guage, 14.5 cm RFN cannula with a 10 mm active tip was then placed into the skin using fluoroscopic guidance and advanced with an oblique view towards the intersection of the transverse process and superior articular process L3-4 facet joint where the L2 medial branch runs levels on the left side, where the medial branch runs. The needle/probe tips were then verified by AP, oblique, and lateral views.     Then A 18 guage, 14.5 cm RFN cannula with a 10 mm active tip was then placed into the skin using fluoroscopic guidance and advanced with an oblique view towards the intersection of the transverse process and superior articular process L4-5 facet joint where the L3 medial branch runs  levels on the left side, where the medial branch runs. The needle/probe tips were then verified by AP, oblique, and lateral views.       Motor stimulation is used as an extra precaution to ensure the needle tips are off the lumbar nerve roots prior to each lesion. Following negative aspiration, a syringe was prepared with 10 mL of 1% lidocaine.  2mL of this syringe was injected at each level.  The needles are not moved, but fluoroscope guidance is used to ensure the needles have not moved. After a wait period of approximately 2 minutes, a radiofrequency lesion was then created at each level with a temperature of 80 degrees centigrade for 90 seconds.     The probes were adjusted to a 2nd location and images were saved in 2+ views views. Motor  testing was done which confirmed no twitching in the leg.  And another radiofrequency lesion was made of 80 °C for 90 seconds. A 2nd radiofrequency lesion was made with 80°C for 90 seconds.      The cannulas were restyletted, and were then removed intact.     Fluoroscopic images in AP and lateral view were saved prior to each radiofrequency neurotomy.    The patient's back was covered with a 4x4 gauze, the area was cleansed with sterile normal saline, and a dressing was applied. There were no complications noted, the patient remained hemodynamically stable, and the patient tolerated the procedure well. The patient was examined in the postoperative area and her strength exam was identical as prior to the procedure.    The patient had 100 percent pain relief postprocedure  Preprocedure pain 7/10 NRS  Postprocedure pain 0 /10 NRS     Follow-up as scheduled    Marco Rios MD  Interventional Spine and Pain  Physical Medicine and Rehabilitation  [facility]            CPT  Radiofrequency ablation (RFA) - lumbar or sacral (1st joint):  45530-76  Radiofrequency ablation (RFA) - lumbar or sacral (each additional joint):  88969-44   moderate procedural sedation first 15 minutes: [id number]

## 2025-04-08 NOTE — OR SURGEON
Immediate Post OP Note    Pre-Op Diagnosis Codes:      * Lumbar spondylosis [M47.816]      Post-Op Diagnosis Codes:     * Lumbar spondylosis [M47.816]      Procedure(s):  RIGHT and LEFT  radiofrequency neurotomies medial branch targeting the L2-3 and L3-4 facet joints with fluoroscopic guidance and sedation    sedation with 0.5mg versed and 25 mcg fentanyl.    Plan for 80 °C for 90 seconds for each neurotomy - Wound Class: Clean    Surgeon(s):  Marco Rios M.D.    Anesthesiologist/Type of Anesthesia:  No anesthesia staff entered./Local    Surgical Staff:  Circulator: Sri Alfaro R.N.  Scrub Person: Cecy Rod  Radiology Technologist: Shelly Baptiste    Specimens removed if any:  * No specimens in log *    Estimated Blood Loss: None    Findings: None    Complications: None        4/8/2025 11:04 AM Marco Rios M.D.

## 2025-04-08 NOTE — INTERVAL H&P NOTE
Consented Procedure: RIGHT and LEFT  radiofrequency neurotomies medial branch targeting the L2-3 and L3-4 facet joints with fluoroscopic guidance and sedation…Note: plan for sedation with 0.5mg versed and 25 mcg fentanyl. Plan for 80 °C for 90 seconds for each neurotomy  I have examined the patient, provided the risks, benefits, and alternatives to the procedure(s) indicated on the signed consent form, and the patient wishes to proceed.    H&P reviewed. The patient was examined and there are no changes to the H&P      Marco Rios M.D.  04/08/25 10:25 AM

## 2025-04-09 ENCOUNTER — TELEPHONE (OUTPATIENT)
Dept: PHYSICAL MEDICINE AND REHAB | Facility: MEDICAL CENTER | Age: 70
End: 2025-04-09
Payer: MEDICARE

## 2025-04-09 NOTE — TELEPHONE ENCOUNTER
Called for post-sp check-up. Pt reported the following regarding the procedure site: right and left radiofrequency neurotomies medial branch targeting the L2-3 and L3-4 facet joints with fluoroscopic guidance and sedation     Change in pain?: LVM    Concerns?:     Confirmed [facility] appt?:

## 2025-05-22 ENCOUNTER — APPOINTMENT (OUTPATIENT)
Dept: PHYSICAL MEDICINE AND REHAB | Facility: MEDICAL CENTER | Age: 70
End: 2025-05-22
Payer: MEDICARE

## 2025-06-02 ENCOUNTER — APPOINTMENT (OUTPATIENT)
Dept: PHYSICAL MEDICINE AND REHAB | Facility: MEDICAL CENTER | Age: 70
End: 2025-06-02
Payer: MEDICARE

## 2025-06-02 VITALS
SYSTOLIC BLOOD PRESSURE: 113 MMHG | HEIGHT: 63 IN | OXYGEN SATURATION: 100 % | BODY MASS INDEX: 24.95 KG/M2 | TEMPERATURE: 98 F | DIASTOLIC BLOOD PRESSURE: 75 MMHG | HEART RATE: 71 BPM | WEIGHT: 140.8 LBS

## 2025-06-02 DIAGNOSIS — M54.16 LUMBAR RADICULOPATHY: ICD-10-CM

## 2025-06-02 DIAGNOSIS — M53.3 SACROILIAC JOINT DYSFUNCTION OF RIGHT SIDE: ICD-10-CM

## 2025-06-02 DIAGNOSIS — M48.02 CERVICAL SPINAL STENOSIS: ICD-10-CM

## 2025-06-02 DIAGNOSIS — M54.12 CERVICAL RADICULOPATHY: Primary | ICD-10-CM

## 2025-06-02 DIAGNOSIS — M47.812 CERVICAL SPONDYLOSIS: ICD-10-CM

## 2025-06-02 DIAGNOSIS — M47.816 LUMBAR SPONDYLOSIS: ICD-10-CM

## 2025-06-02 PROCEDURE — 99214 OFFICE O/P EST MOD 30 MIN: CPT | Performed by: PHYSICAL MEDICINE & REHABILITATION

## 2025-06-02 PROCEDURE — 3074F SYST BP LT 130 MM HG: CPT | Performed by: PHYSICAL MEDICINE & REHABILITATION

## 2025-06-02 PROCEDURE — 1125F AMNT PAIN NOTED PAIN PRSNT: CPT | Performed by: PHYSICAL MEDICINE & REHABILITATION

## 2025-06-02 PROCEDURE — 3078F DIAST BP <80 MM HG: CPT | Performed by: PHYSICAL MEDICINE & REHABILITATION

## 2025-06-02 ASSESSMENT — PAIN SCALES - GENERAL: PAINLEVEL_OUTOF10: 7=MODERATE-SEVERE PAIN

## 2025-06-02 ASSESSMENT — FIBROSIS 4 INDEX: FIB4 SCORE: 1.67

## 2025-06-02 ASSESSMENT — PATIENT HEALTH QUESTIONNAIRE - PHQ9: CLINICAL INTERPRETATION OF PHQ2 SCORE: 0

## 2025-06-02 NOTE — PROGRESS NOTES
Follow up patient note  Interventional spine and sports physiatry, Physical medicine rehabilitation      Chief complaint:   Chief Complaint   Patient presents with    Follow-Up     4 Wk post SP             HISTORY    Please see new patient note by Dr Rios,  for more details.     HPI  Patient identification: Shawn Westbrook  1955,   female  With The primary encounter diagnosis was Cervical radiculopathy. Diagnoses of Cervical spinal stenosis, Cervical spondylosis, Lumbar spondylosis, Lumbar radiculopathy, and Sacroiliac joint dysfunction of right side were also pertinent to this visit.     Procedures:  2020 C7-T1 interlaminar epidural steroid injection with 100% improvement in pain and function in regards to the neck and arm pain after the injection follow-up visit on 2020 C7-T1 cervical interlaminar epidural steroid injection initially with 60% improvement but this only lasted for a few weeks  11/10/2022 right long digit trigger finger injection 100% relief of triggering  11/10/2022 right carpal tunnel injection 100% pain relief  1/10/2023 C7-T1 cervical interlaminar epidural steroid injection 100% pain relief  2024 C7-T1 left cervical interlaminar epidural steroid injection 90% pain relief postprocedure  2024 bilateral L5-S1 transforaminal epidural steroid injection 80% improvement in radiating pain.  4/15/2024 right carpal tunnel injection  2024 bilateral sacroiliac joint injection 70% improvement in pain in the sacroiliac joint.  Preprocedure pain 6/ 10 NRS postprocedure pain 1/10 NRS dislocation  10/2/2024 bilateral L3-4 transforaminal epidural steroid injection 100% improvement in the radicular component of her pain.  2025 diagnostic medial branch block targeting the bilateral lumbar L2-3 and L3-4 facet joints.  80% pain relief preprocedure pain 6/10 numeric rating scale postprocedure pain 1/10 numeric rating scale.  2025 diagnostic medial branch block  targeting the bilateral lumbar L2-3 and L3-4 facet joints.  80% pain relief preprocedure pain 7/10 numeric rating scale postprocedure pain 1/10 numeric rating scale.  04/08/2025 medial branch radiofrequency neurotomy targeting the bilateral lumbar L2-3 and L3-4 facet joints    History of Present Illness  The patient is a 69-year-old female presenting for a follow-up evaluation.    Chronic Axial Low Back Pain  She reports chronic axial low back pain, which is nonradiating. Her lumbar radiculopathy showed improvement following an epidural steroid injection. She is status post bilateral medial branch radiofrequency neurotomy performed on 04/08/2025, targeting the lumbar L2-L3 and L3-L4 facet joints. The pain is described as aching and can radiate down the bilateral legs, with an intensity rated at 7/10. The ablation provided significant relief for a duration of 6-8 weeks; however, the pain has recurred and is severe, extending across the lower back and intensifying when lying down. She occasionally experiences pain in the right leg and thigh, with more pronounced sciatic symptoms on the left side.  - Onset: Chronic, with recent recurrence after initial relief from ablation.  - Location: Axial low back, occasionally right leg and thigh, more pronounced sciatic symptoms on the left side.  - Duration: Chronic, with significant relief for 6-8 weeks post-ablation where the patient reported 100% relief of pain during this time..  - Character: Axial and aching, intensity rated at 7/10.  - Alleviating/Aggravating Factors: Ablation provided relief; pain intensifies when lying down.  - Severity: Severe, with intensity rated at 7/10.    Neck Pain  Additionally, she reports that her neck pain is more bothersome than her low back pain. The patient received a cervical epidural injection which provided 14 months of relief, but the pain has since returned and now extends to the right shoulder and arm. She is considering a bicipital  injection and inquires about the possibility of simultaneous neck and back injections.  - Onset: Pain returned after 14 months of relief from cervical epidural injection.  - Location: Neck, extending to the right shoulder and arm.  - Duration: 14 months of relief followed by recurrence.  - Character: More bothersome than low back pain.  - Severity: Significant enough to consider further injections.    Sacroiliac Joint Pain  She is focusing on balance exercises and activities. The sacroiliac joint pain is reported as not severe.    MEDICATIONS  - Current: meloxicam        The patient has done a provider driven home exercise program including the past 6 months.     Medications tried include gabapentin which had to be stopped because of side effects.  Patient is on Lyrica now. She is also use meloxicam and Flexeril.           ROS Red Flags :   Fever, Chills, Sweats: Denies  Involuntary Weight Loss: Denies  Bowel/Bladder Incontinence: Denies  Saddle Anesthesia: Denies        PMHx:   Past Medical History:   Diagnosis Date    Chronic low back pain     Hypertension     MVP (mitral valve prolapse)     Pericarditis 2010       PSHx:   Past Surgical History:   Procedure Laterality Date    RADIO FREQUENCY ABLATION ADDITIONAL LEVEL Bilateral 4/8/2025    Procedure: RIGHT and LEFT  radiofrequency neurotomies medial branch targeting the L2-3 and L3-4 facet joints with fluoroscopic guidance and sedation…Note: plan for sedation with 0.5mg versed and 25 mcg fentanyl. Plan for 80 °C for 90 seconds for each neurotomy;  Surgeon: Marco Rios M.D.;  [facility];  Service: Pain Management    OK NEUROPLASTY & OR TRANSPOS MEDIAN NRV CARPAL ALBERTO Left 3/21/2025    Procedure: LEFT HAND OPEN CARPAL TUNNEL RELEASE;  Surgeon: Tony Young M.D.;  Location: [facility];  Service: Orthopedics    LUMBAR MEDIAL BRANCH BLOCKS Bilateral 2/11/2025    Procedure: Diagnostic medial branch blocks targeting  the BILATERAL L2-3 and L3-4 facet joints with fluoroscopic guidance #2…..Note: Diagnostic medial branch block #2 is only be done if procedure #1 is a positive block.  This will be on a separate date from procedure #1.;  Surgeon: Marco Rios M.D.;  Location: [facility];  Service: Pain Management    LUMBAR MEDIAL BRANCH BLOCKS Bilateral 1/28/2025    Procedure: Diagnostic medial branch blocks targeting the BILATERAL L2-3 and L3-4 facet joints with fluoroscopic guidance #1;  Surgeon: Marco Rios M.D.;  [facility]: [facility];  Service: Pain Management    ND NJX AA&/STRD TFRML EPI LUMBAR/SACRAL 1 LEVEL Bilateral 10/2/2024    Procedure: BILATERAL L3-4 transforaminal epidural steroid injection with fluoroscopic guidance;  Surgeon: Marco Rios M.D.;  [facility];  Service: Pain Management    ND INJECTION,SACROILIAC JOINT Bilateral 5/14/2024    Procedure: RIGHT and Left sacroiliac joint injection with fluoroscopic guidance;  Surgeon: Marco Rios M.D.;  Location: [facility];  Service: Pain Management    ND NJX AA&/STRD TFRML EPI LUMBAR/SACRAL 1 LEVEL Bilateral 2/13/2024    Procedure: BILATERAL L5-S1 versus L4-5 transforaminal epidural steroid injection with fluoroscopic guidance.    Note: cervical injection approximately 1 month prior to the lumbar;  Surgeon: Marco Rios M.D.;  [facility]: [facility];  Service: Pain Management    ND INJ CERV/THORAC,W/ IMAGING Left 1/16/2024    Procedure: LEFT cervical C7-T1 interlaminar epidural steroid injection.    Note: cervical approximately 1 month prior to the lumbar;  Surgeon: Marco Rios M.D.;  [facility]: [facility];  Service: Pain Management    ND INJ CERV/THORAC,W/ IMAGING N/A 1/10/2023    Procedure: C7-T1 interlaminar epidural steroid injection;  Surgeon: Marco Rios M.D.;  [facility]: [facility];  Service: Pain  Management    LA INJ CERV/THORAC,W/ IMAGING N/A 9/27/2022    Procedure: C7-T1 interlaminar epidural steroid injection;  Surgeon: Marco Rios M.D.;  [facility]: [facility];  Service: Pain Management    ABDOMINAL HYSTERECTOMY TOTAL      cervix remains and one ovary.     ARTHROPLASTY Bilateral     Total knee replacements    LAMINOTOMY      L4-5, L5-S1 fusion    OTHER ORTHOPEDIC SURGERY      spinal fusion    OTHER ORTHOPEDIC SURGERY      right knee replacement       Family history   Family History   Problem Relation Age of Onset    Heart Disease Mother     Heart Disease Father     Cancer Father     Alcohol abuse Father     Diabetes Sister     Diabetes Sister     Breast Cancer Maternal Grandmother     Dementia Paternal Grandmother          Medications:   Outpatient Medications Marked as Taking for the 6/2/25 encounter (Office Visit) with Marco Rios M.D.   Medication Sig Dispense Refill    methylPREDNISolone (MEDROL DOSEPAK) 4 MG Tablet Therapy Pack Follow schedule on package instructions. 21 Tablet 0    lidocaine (LIDODERM) 5 % Patch Place 1 Patch on the skin every 24 hours. Apply for 12 hours maximum and then take off for 12 hours. 30 Patch 5    DULoxetine (CYMBALTA) 30 MG Cap DR Particles Take 1 Capsule by mouth every day. 90 Capsule 1    omeprazole (PRILOSEC) 40 MG delayed-release capsule Take 1 Capsule by mouth every day. 90 Capsule 1    pregabalin (LYRICA) 100 MG Cap 1 tablet p.o. in the morning and 2 tablets p.o. in the evening 270 Capsule 0    simvastatin (ZOCOR) 20 MG Tab TAKE 1 TABLET BY MOUTH EVERY EVENING 90 Tablet 1    meloxicam (MOBIC) 15 MG tablet TAKE 1 TABLET BY MOUTH EVERY DAY WITH FOOD. NO ADVIL/ALEVE/MOTRIN/IBUPROFEN ON THE SAME DAY. 90 Tablet 1    Melatonin 1 MG Cap Take  by mouth.      tretinoin (RETIN-A) 0.025 % cream APPLY PEA SIZED AMOUNT TOPICALLY TO FACE EVERY 3 NIGHTS. INCREASE TO EVERY NIGHT AS IRRITATION ALLOWS          Current Outpatient Medications on File Prior  to Visit   Medication Sig Dispense Refill    methylPREDNISolone (MEDROL DOSEPAK) 4 MG Tablet Therapy Pack Follow schedule on package instructions. 21 Tablet 0    lidocaine (LIDODERM) 5 % Patch Place 1 Patch on the skin every 24 hours. Apply for 12 hours maximum and then take off for 12 hours. 30 Patch 5    DULoxetine (CYMBALTA) 30 MG Cap DR Particles Take 1 Capsule by mouth every day. 90 Capsule 1    omeprazole (PRILOSEC) 40 MG delayed-release capsule Take 1 Capsule by mouth every day. 90 Capsule 1    pregabalin (LYRICA) 100 MG Cap 1 tablet p.o. in the morning and 2 tablets p.o. in the evening 270 Capsule 0    simvastatin (ZOCOR) 20 MG Tab TAKE 1 TABLET BY MOUTH EVERY EVENING 90 Tablet 1    meloxicam (MOBIC) 15 MG tablet TAKE 1 TABLET BY MOUTH EVERY DAY WITH FOOD. NO ADVIL/ALEVE/MOTRIN/IBUPROFEN ON THE SAME DAY. 90 Tablet 1    Melatonin 1 MG Cap Take  by mouth.      tretinoin (RETIN-A) 0.025 % cream APPLY PEA SIZED AMOUNT TOPICALLY TO FACE EVERY 3 NIGHTS. INCREASE TO EVERY NIGHT AS IRRITATION ALLOWS      Semaglutide, 1 MG/DOSE, 4 MG/3ML Solution Pen-injector Inject  under the skin.       No current facility-administered medications on file prior to visit.         Allergies:   No Known Allergies      Social Hx:   Social History     Socioeconomic History    Marital status:      Spouse name: Not on file    Number of children: Not on file    Years of education: Not on file    Highest education level: Not on file   Occupational History     Employer: OTHER     Comment: retired   Tobacco Use    Smoking status: Former     Current packs/day: 0.00     Types: Cigarettes     Start date: 1980     Quit date: 1988     Years since quittin.0     Passive exposure: Past    Smokeless tobacco: Never   Vaping Use    Vaping status: Never Used   Substance and Sexual Activity    Alcohol use: Yes     Alcohol/week: 0.0 oz     Comment: rare    Drug use: No    Sexual activity: Not Currently     Comment:    Other  "Topics Concern     Service No    Blood Transfusions No    Caffeine Concern No    Occupational Exposure No    Hobby Hazards No    Sleep Concern Yes     Comment: due to pain    Stress Concern Yes    Weight Concern No    Special Diet No    Back Care No    Exercise Yes    Bike Helmet No    Seat Belt Yes    Self-Exams Yes   Social History Narrative    Not on file     Social Drivers of Health     Financial Resource Strain: Not on file   Food Insecurity: Not on file   Transportation Needs: Not on file   Physical Activity: Not on file   Stress: Not on file   Social Connections: Not on file   Intimate Partner Violence: Not on file   Housing Stability: Not on file         EXAMINATION     Physical Exam:   Vitals: /75 (BP Location: Left arm, Patient Position: Sitting, BP Cuff Size: Adult)   Pulse 71   Temp 36.7 °C (98 °F) (Temporal)   Ht 1.588 m (5' 2.5\")   Wt 63.9 kg (140 lb 12.8 oz)   SpO2 100%     Constitutional:   Body Habitus: Body mass index is 25.34 kg/m².  Cooperation: Fully cooperates with exam  Appearance: Well-groomed no disheveled    Respiratory-  breathing comfortable on room air, no audible wheezing  Cardiovascular- capillary refills less than 2 seconds. No lower extremity edema is noted.   Psychiatric- alert and oriented ×3. Normal affect.    MSK and Neuro: -    Physical Exam  Positive cervical facet loading maneuver bilaterally. Decreased cervical spine ROM. Positive Spurling's maneuver on right. Positive straight leg raise, slump test, femoral stretch test bilaterally. Improved lumbar extension ROM. Negative extension rotation maneuver bilaterally. No signs of infection at procedure sites.        MEDICAL DECISION MAKING    DATA    Labs:   Lab Results   Component Value Date/Time    SODIUM 140 07/05/2024 06:58 AM    POTASSIUM 3.9 07/05/2024 06:58 AM    CHLORIDE 104 07/05/2024 06:58 AM    CO2 24 07/05/2024 06:58 AM    GLUCOSE 94 07/05/2024 06:58 AM    BUN 20 07/05/2024 06:58 AM    CREATININE " "0.66 07/05/2024 06:58 AM        No results found for: \"PROTHROMBTM\", \"INR\"     Lab Results   Component Value Date/Time    WBC 5.7 07/05/2024 06:58 AM    RBC 4.49 07/05/2024 06:58 AM    HEMOGLOBIN 13.7 07/05/2024 06:58 AM    HEMATOCRIT 42.3 07/05/2024 06:58 AM    MCV 94.2 07/05/2024 06:58 AM    MCH 30.5 07/05/2024 06:58 AM    MCHC 32.4 07/05/2024 06:58 AM    MPV 12.7 07/05/2024 06:58 AM    NEUTSPOLYS 61.20 07/05/2024 06:58 AM    LYMPHOCYTES 27.00 07/05/2024 06:58 AM    MONOCYTES 8.40 07/05/2024 06:58 AM    EOSINOPHILS 2.10 07/05/2024 06:58 AM    BASOPHILS 1.10 07/05/2024 06:58 AM        Lab Results   Component Value Date/Time    HBA1C 5.6 06/25/2014 11:27 AM          Imaging:   I personally reviewed following images    MRI cervical spine 6/ 17/2020  There is mild to moderate central canal stenosis at C5-6 with severe left and moderate right neuroforaminal stenosis.  There is mild central canal stenosis at C6-7 with moderate bilateral neuroforaminal stenosis.  There is facet arthropathy right at C2-3.  Also facet arthropathy right worse than left at C3-4, C4-5, C5-6.    4/15/2024 XR hip  Mild degenerative changes in the hip.    Results  I reviewed the images from 04/08/2025 showing successful bilateral medial branch radiofrequency neurotomy targeting the bilateral lumbar L2-3 and L3-4 facet joints       I reviewed the following radiology reports                                 Xr HIP 4/15/2024  IMPRESSION:   1.  No RIGHT hip or pelvic fracture.  2.  Mild degenerative change of both hips.  3.  Degenerative and postoperative changes of lower lumbar spine.    MRI lumbar spine 5/3/2022        Results for orders placed during the hospital encounter of 06/17/20   MR-CERVICAL SPINE-W/O    Impression 1.  Mild spinal canal narrowing at C5-6 and C6-7    2.  Foraminal stenoses at C5-6 and C6-7    3.  Multilevel disc bulge, endplate spurring, and facet arthropathy                     Results for orders placed during the hospital " encounter of 20   DX-CERVICAL SPINE-WITH FLEX-EXT   7+    Impression 1.  No compression deformity or acute fracture.    2.  There is degenerative disc disease and facet arthropathy with bilateral osseous neural foraminal narrowing.    3.  No focal instability is noted on flexion extension views.                            ELECTRODIAGNOSTICS  EMG/nerve conduction study performed on  22  This is an abnormal study.  There is electrophysiologic evidence of mild right median neuropathy at the wrist (carpal tunnel syndrome).  There is no evidence of right cervical (C5-T1) radiculopathy.  Clinical correlation is recommended.                                                               DIAGNOSIS   Visit Diagnoses     ICD-10-CM   1. Cervical radiculopathy  M54.12   2. Cervical spinal stenosis  M48.02   3. Cervical spondylosis  M47.812   4. Lumbar spondylosis  M47.816   5. Lumbar radiculopathy  M54.16   6. Sacroiliac joint dysfunction of right side  M53.3         ASSESSMENT and PLAN:     Shawn Westbrook  1955,   female      Shawn was seen today for follow-up.    Diagnoses and all orders for this visit:    Cervical radiculopathy  -     Pain Hospital Procedure; Future    Cervical spinal stenosis    Cervical spondylosis    Lumbar spondylosis    Lumbar radiculopathy  -     Pain Hospital Procedure; Future    Sacroiliac joint dysfunction of right side      Assessment & Plan  1. Cervical radiculopathy: Acute on chronic. Recurrence post cervical epidural steroid injection.  - C7-T1 cervical interlaminar epidural steroid injection  - The risks benefits and alternatives to this procedure were discussed and the patient wishes to proceed with the procedure. Risks include but are not limited to damage to surrounding structures, infection, bleeding, worsening of pain which can be permanent, weakness which can be permanent. Benefits include pain relief, improved function. Alternatives includes not doing the procedure.           2. Cervical spondylosis.  - Continue home exercise program  - Consider medial branch blocks if conservative treatments fail    3. Lumbar spondylosis: Stable. Improved post medial branch radiofrequency neurotomy on 04/08/2025. Improved ROM, negative facet loading maneuver.  - Continue home exercise program    4. Lumbar radiculopathy. Causing back and radiating pain. Recurrence noted.  - Bilateral lumbar L3-L4 transforaminal epidural steroid injections one month post neck injection  - The risks benefits and alternatives to this procedure were discussed and the patient wishes to proceed with the procedure. Risks include but are not limited to damage to surrounding structures, infection, bleeding, worsening of pain which can be permanent, weakness which can be permanent. Benefits include pain relief, improved function. Alternatives includes not doing the procedure.      5.  Medication management  -Discontinue meloxicam 5 days prior to procedures  - Resume meloxicam post-procedure    Follow-up  - Follow-up 6 weeks post injections for medication management    PROCEDURE  Bilateral medial branch radiofrequency neurotomy targeting lumbar L2-3 and L3-4 facet joints on 04/08/2025.      Thank you for allowing me to participate in the care of this patient. If you have any questions please not hesitate to contact me.             Please note that this dictation was created using voice recognition software. I have made every reasonable attempt to correct obvious errors but there may be errors of grammar and content that I may have overlooked prior to finalization of this note.      Marco Rios MD  Interventional Spine and Sports Physiatry  Physical Medicine and Rehabilitation  [facility]

## 2025-06-02 NOTE — H&P (VIEW-ONLY)
Follow up patient note  Interventional spine and sports physiatry, Physical medicine rehabilitation      Chief complaint:   Chief Complaint   Patient presents with    Follow-Up     4 Wk post SP             HISTORY    Please see new patient note by Dr Rios,  for more details.     HPI  Patient identification: Shawn Westbrook  1955,   female  With The primary encounter diagnosis was Cervical radiculopathy. Diagnoses of Cervical spinal stenosis, Cervical spondylosis, Lumbar spondylosis, Lumbar radiculopathy, and Sacroiliac joint dysfunction of right side were also pertinent to this visit.     Procedures:  2020 C7-T1 interlaminar epidural steroid injection with 100% improvement in pain and function in regards to the neck and arm pain after the injection follow-up visit on 2020 C7-T1 cervical interlaminar epidural steroid injection initially with 60% improvement but this only lasted for a few weeks  11/10/2022 right long digit trigger finger injection 100% relief of triggering  11/10/2022 right carpal tunnel injection 100% pain relief  1/10/2023 C7-T1 cervical interlaminar epidural steroid injection 100% pain relief  2024 C7-T1 left cervical interlaminar epidural steroid injection 90% pain relief postprocedure  2024 bilateral L5-S1 transforaminal epidural steroid injection 80% improvement in radiating pain.  4/15/2024 right carpal tunnel injection  2024 bilateral sacroiliac joint injection 70% improvement in pain in the sacroiliac joint.  Preprocedure pain 6/ 10 NRS postprocedure pain 1/10 NRS dislocation  10/2/2024 bilateral L3-4 transforaminal epidural steroid injection 100% improvement in the radicular component of her pain.  2025 diagnostic medial branch block targeting the bilateral lumbar L2-3 and L3-4 facet joints.  80% pain relief preprocedure pain 6/10 numeric rating scale postprocedure pain 1/10 numeric rating scale.  2025 diagnostic medial branch block  targeting the bilateral lumbar L2-3 and L3-4 facet joints.  80% pain relief preprocedure pain 7/10 numeric rating scale postprocedure pain 1/10 numeric rating scale.  04/08/2025 medial branch radiofrequency neurotomy targeting the bilateral lumbar L2-3 and L3-4 facet joints    History of Present Illness  The patient is a 69-year-old female presenting for a follow-up evaluation.    Chronic Axial Low Back Pain  She reports chronic axial low back pain, which is nonradiating. Her lumbar radiculopathy showed improvement following an epidural steroid injection. She is status post bilateral medial branch radiofrequency neurotomy performed on 04/08/2025, targeting the lumbar L2-L3 and L3-L4 facet joints. The pain is described as aching and can radiate down the bilateral legs, with an intensity rated at 7/10. The ablation provided significant relief for a duration of 6-8 weeks; however, the pain has recurred and is severe, extending across the lower back and intensifying when lying down. She occasionally experiences pain in the right leg and thigh, with more pronounced sciatic symptoms on the left side.  - Onset: Chronic, with recent recurrence after initial relief from ablation.  - Location: Axial low back, occasionally right leg and thigh, more pronounced sciatic symptoms on the left side.  - Duration: Chronic, with significant relief for 6-8 weeks post-ablation where the patient reported 100% relief of pain during this time..  - Character: Axial and aching, intensity rated at 7/10.  - Alleviating/Aggravating Factors: Ablation provided relief; pain intensifies when lying down.  - Severity: Severe, with intensity rated at 7/10.    Neck Pain  Additionally, she reports that her neck pain is more bothersome than her low back pain. The patient received a cervical epidural injection which provided 14 months of relief, but the pain has since returned and now extends to the right shoulder and arm. She is considering a bicipital  injection and inquires about the possibility of simultaneous neck and back injections.  - Onset: Pain returned after 14 months of relief from cervical epidural injection.  - Location: Neck, extending to the right shoulder and arm.  - Duration: 14 months of relief followed by recurrence.  - Character: More bothersome than low back pain.  - Severity: Significant enough to consider further injections.    Sacroiliac Joint Pain  She is focusing on balance exercises and activities. The sacroiliac joint pain is reported as not severe.    MEDICATIONS  - Current: meloxicam        The patient has done a provider driven home exercise program including the past 6 months.     Medications tried include gabapentin which had to be stopped because of side effects.  Patient is on Lyrica now. She is also use meloxicam and Flexeril.           ROS Red Flags :   Fever, Chills, Sweats: Denies  Involuntary Weight Loss: Denies  Bowel/Bladder Incontinence: Denies  Saddle Anesthesia: Denies        PMHx:   Past Medical History:   Diagnosis Date    Chronic low back pain     Hypertension     MVP (mitral valve prolapse)     Pericarditis 2010       PSHx:   Past Surgical History:   Procedure Laterality Date    RADIO FREQUENCY ABLATION ADDITIONAL LEVEL Bilateral 4/8/2025    Procedure: RIGHT and LEFT  radiofrequency neurotomies medial branch targeting the L2-3 and L3-4 facet joints with fluoroscopic guidance and sedation…Note: plan for sedation with 0.5mg versed and 25 mcg fentanyl. Plan for 80 °C for 90 seconds for each neurotomy;  Surgeon: Marco Rios M.D.;  [facility];  Service: Pain Management    AR NEUROPLASTY & OR TRANSPOS MEDIAN NRV CARPAL ALBERTO Left 3/21/2025    Procedure: LEFT HAND OPEN CARPAL TUNNEL RELEASE;  Surgeon: Tony Young M.D.;  Location: [facility];  Service: Orthopedics    LUMBAR MEDIAL BRANCH BLOCKS Bilateral 2/11/2025    Procedure: Diagnostic medial branch blocks targeting  the BILATERAL L2-3 and L3-4 facet joints with fluoroscopic guidance #2…..Note: Diagnostic medial branch block #2 is only be done if procedure #1 is a positive block.  This will be on a separate date from procedure #1.;  Surgeon: Marco Rios M.D.;  [facility]: [facility];  Service: Pain Management    LUMBAR MEDIAL BRANCH BLOCKS Bilateral 1/28/2025    Procedure: Diagnostic medial branch blocks targeting the BILATERAL L2-3 and L3-4 facet joints with fluoroscopic guidance #1;  Surgeon: Marco Rios M.D.;  [facility];  Service: Pain Management    KS NJX AA&/STRD TFRML EPI LUMBAR/SACRAL 1 LEVEL Bilateral 10/2/2024    Procedure: BILATERAL L3-4 transforaminal epidural steroid injection with fluoroscopic guidance;  Surgeon: Marco Rios M.D.;  [facility];  Service: Pain Management    KS INJECTION,SACROILIAC JOINT Bilateral 5/14/2024    Procedure: RIGHT and Left sacroiliac joint injection with fluoroscopic guidance;  Surgeon: Marco Rios M.D.;  [facility]: [facility];  Service: Pain Management    KS NJX AA&/STRD TFRML EPI LUMBAR/SACRAL 1 LEVEL Bilateral 2/13/2024    Procedure: BILATERAL L5-S1 versus L4-5 transforaminal epidural steroid injection with fluoroscopic guidance.    Note: cervical injection approximately 1 month prior to the lumbar;  Surgeon: Marco Rios M.D.;  [facility];  Service: Pain Management    KS INJ CERV/THORAC,W/ IMAGING Left 1/16/2024    Procedure: LEFT cervical C7-T1 interlaminar epidural steroid injection.    Note: cervical approximately 1 month prior to the lumbar;  Surgeon: Marco Rios M.D.;  [facility];  Service: Pain Management    KS INJ CERV/THORAC,W/ IMAGING N/A 1/10/2023    Procedure: C7-T1 interlaminar epidural steroid injection;  Surgeon: Marco Rios M.D.;  [facility];  Service: Pain  Management    KS INJ CERV/THORAC,W/ IMAGING N/A 9/27/2022    Procedure: C7-T1 interlaminar epidural steroid injection;  Surgeon: Marco Rios M.D.;  [facility];  Service: Pain Management    ABDOMINAL HYSTERECTOMY TOTAL      cervix remains and one ovary.     ARTHROPLASTY Bilateral     Total knee replacements    LAMINOTOMY      L4-5, L5-S1 fusion    OTHER ORTHOPEDIC SURGERY      spinal fusion    OTHER ORTHOPEDIC SURGERY      right knee replacement       Family history   Family History   Problem Relation Age of Onset    Heart Disease Mother     Heart Disease Father     Cancer Father     Alcohol abuse Father     Diabetes Sister     Diabetes Sister     Breast Cancer Maternal Grandmother     Dementia Paternal Grandmother          Medications:   Outpatient Medications Marked as Taking for the 6/2/25 encounter (Office Visit) with Marco Rios M.D.   Medication Sig Dispense Refill    methylPREDNISolone (MEDROL DOSEPAK) 4 MG Tablet Therapy Pack Follow schedule on package instructions. 21 Tablet 0    lidocaine (LIDODERM) 5 % Patch Place 1 Patch on the skin every 24 hours. Apply for 12 hours maximum and then take off for 12 hours. 30 Patch 5    DULoxetine (CYMBALTA) 30 MG Cap DR Particles Take 1 Capsule by mouth every day. 90 Capsule 1    omeprazole (PRILOSEC) 40 MG delayed-release capsule Take 1 Capsule by mouth every day. 90 Capsule 1    pregabalin (LYRICA) 100 MG Cap 1 tablet p.o. in the morning and 2 tablets p.o. in the evening 270 Capsule 0    simvastatin (ZOCOR) 20 MG Tab TAKE 1 TABLET BY MOUTH EVERY EVENING 90 Tablet 1    meloxicam (MOBIC) 15 MG tablet TAKE 1 TABLET BY MOUTH EVERY DAY WITH FOOD. NO ADVIL/ALEVE/MOTRIN/IBUPROFEN ON THE SAME DAY. 90 Tablet 1    Melatonin 1 MG Cap Take  by mouth.      tretinoin (RETIN-A) 0.025 % cream APPLY PEA SIZED AMOUNT TOPICALLY TO FACE EVERY 3 NIGHTS. INCREASE TO EVERY NIGHT AS IRRITATION ALLOWS          Current Outpatient Medications on File Prior  to Visit   Medication Sig Dispense Refill    methylPREDNISolone (MEDROL DOSEPAK) 4 MG Tablet Therapy Pack Follow schedule on package instructions. 21 Tablet 0    lidocaine (LIDODERM) 5 % Patch Place 1 Patch on the skin every 24 hours. Apply for 12 hours maximum and then take off for 12 hours. 30 Patch 5    DULoxetine (CYMBALTA) 30 MG Cap DR Particles Take 1 Capsule by mouth every day. 90 Capsule 1    omeprazole (PRILOSEC) 40 MG delayed-release capsule Take 1 Capsule by mouth every day. 90 Capsule 1    pregabalin (LYRICA) 100 MG Cap 1 tablet p.o. in the morning and 2 tablets p.o. in the evening 270 Capsule 0    simvastatin (ZOCOR) 20 MG Tab TAKE 1 TABLET BY MOUTH EVERY EVENING 90 Tablet 1    meloxicam (MOBIC) 15 MG tablet TAKE 1 TABLET BY MOUTH EVERY DAY WITH FOOD. NO ADVIL/ALEVE/MOTRIN/IBUPROFEN ON THE SAME DAY. 90 Tablet 1    Melatonin 1 MG Cap Take  by mouth.      tretinoin (RETIN-A) 0.025 % cream APPLY PEA SIZED AMOUNT TOPICALLY TO FACE EVERY 3 NIGHTS. INCREASE TO EVERY NIGHT AS IRRITATION ALLOWS      Semaglutide, 1 MG/DOSE, 4 MG/3ML Solution Pen-injector Inject  under the skin.       No current facility-administered medications on file prior to visit.         Allergies:   No Known Allergies      Social Hx:   Social History     Socioeconomic History    Marital status:      Spouse name: Not on file    Number of children: Not on file    Years of education: Not on file    Highest education level: Not on file   Occupational History     Employer: OTHER     Comment: retired   Tobacco Use    Smoking status: Former     Current packs/day: 0.00     Types: Cigarettes     Start date: 1980     Quit date: 1988     Years since quittin.0     Passive exposure: Past    Smokeless tobacco: Never   Vaping Use    Vaping status: Never Used   Substance and Sexual Activity    Alcohol use: Yes     Alcohol/week: 0.0 oz     Comment: rare    Drug use: No    Sexual activity: Not Currently     Comment:    Other  "Topics Concern     Service No    Blood Transfusions No    Caffeine Concern No    Occupational Exposure No    Hobby Hazards No    Sleep Concern Yes     Comment: due to pain    Stress Concern Yes    Weight Concern No    Special Diet No    Back Care No    Exercise Yes    Bike Helmet No    Seat Belt Yes    Self-Exams Yes   Social History Narrative    Not on file     Social Drivers of Health     Financial Resource Strain: Not on file   Food Insecurity: Not on file   Transportation Needs: Not on file   Physical Activity: Not on file   Stress: Not on file   Social Connections: Not on file   Intimate Partner Violence: Not on file   Housing Stability: Not on file         EXAMINATION     Physical Exam:   Vitals: /75 (BP Location: Left arm, Patient Position: Sitting, BP Cuff Size: Adult)   Pulse 71   Temp 36.7 °C (98 °F) (Temporal)   Ht 1.588 m (5' 2.5\")   Wt 63.9 kg (140 lb 12.8 oz)   SpO2 100%     Constitutional:   Body Habitus: Body mass index is 25.34 kg/m².  Cooperation: Fully cooperates with exam  Appearance: Well-groomed no disheveled    Respiratory-  breathing comfortable on room air, no audible wheezing  Cardiovascular- capillary refills less than 2 seconds. No lower extremity edema is noted.   Psychiatric- alert and oriented ×3. Normal affect.    MSK and Neuro: -    Physical Exam  Positive cervical facet loading maneuver bilaterally. Decreased cervical spine ROM. Positive Spurling's maneuver on right. Positive straight leg raise, slump test, femoral stretch test bilaterally. Improved lumbar extension ROM. Negative extension rotation maneuver bilaterally. No signs of infection at procedure sites.        MEDICAL DECISION MAKING    DATA    Labs:   Lab Results   Component Value Date/Time    SODIUM 140 07/05/2024 06:58 AM    POTASSIUM 3.9 07/05/2024 06:58 AM    CHLORIDE 104 07/05/2024 06:58 AM    CO2 24 07/05/2024 06:58 AM    GLUCOSE 94 07/05/2024 06:58 AM    BUN 20 07/05/2024 06:58 AM    CREATININE " "0.66 07/05/2024 06:58 AM        No results found for: \"PROTHROMBTM\", \"INR\"     Lab Results   Component Value Date/Time    WBC 5.7 07/05/2024 06:58 AM    RBC 4.49 07/05/2024 06:58 AM    HEMOGLOBIN 13.7 07/05/2024 06:58 AM    HEMATOCRIT 42.3 07/05/2024 06:58 AM    MCV 94.2 07/05/2024 06:58 AM    MCH 30.5 07/05/2024 06:58 AM    MCHC 32.4 07/05/2024 06:58 AM    MPV 12.7 07/05/2024 06:58 AM    NEUTSPOLYS 61.20 07/05/2024 06:58 AM    LYMPHOCYTES 27.00 07/05/2024 06:58 AM    MONOCYTES 8.40 07/05/2024 06:58 AM    EOSINOPHILS 2.10 07/05/2024 06:58 AM    BASOPHILS 1.10 07/05/2024 06:58 AM        Lab Results   Component Value Date/Time    HBA1C 5.6 06/25/2014 11:27 AM          Imaging:   I personally reviewed following images    MRI cervical spine 6/ 17/2020  There is mild to moderate central canal stenosis at C5-6 with severe left and moderate right neuroforaminal stenosis.  There is mild central canal stenosis at C6-7 with moderate bilateral neuroforaminal stenosis.  There is facet arthropathy right at C2-3.  Also facet arthropathy right worse than left at C3-4, C4-5, C5-6.    4/15/2024 XR hip  Mild degenerative changes in the hip.    Results  I reviewed the images from 04/08/2025 showing successful bilateral medial branch radiofrequency neurotomy targeting the bilateral lumbar L2-3 and L3-4 facet joints       I reviewed the following radiology reports                                 Xr HIP 4/15/2024  IMPRESSION:   1.  No RIGHT hip or pelvic fracture.  2.  Mild degenerative change of both hips.  3.  Degenerative and postoperative changes of lower lumbar spine.    MRI lumbar spine 5/3/2022        Results for orders placed during the hospital encounter of 06/17/20   MR-CERVICAL SPINE-W/O    Impression 1.  Mild spinal canal narrowing at C5-6 and C6-7    2.  Foraminal stenoses at C5-6 and C6-7    3.  Multilevel disc bulge, endplate spurring, and facet arthropathy                     Results for orders placed during the hospital " encounter of 20   DX-CERVICAL SPINE-WITH FLEX-EXT   7+    Impression 1.  No compression deformity or acute fracture.    2.  There is degenerative disc disease and facet arthropathy with bilateral osseous neural foraminal narrowing.    3.  No focal instability is noted on flexion extension views.                            ELECTRODIAGNOSTICS  EMG/nerve conduction study performed on  22  This is an abnormal study.  There is electrophysiologic evidence of mild right median neuropathy at the wrist (carpal tunnel syndrome).  There is no evidence of right cervical (C5-T1) radiculopathy.  Clinical correlation is recommended.                                                               DIAGNOSIS   Visit Diagnoses     ICD-10-CM   1. Cervical radiculopathy  M54.12   2. Cervical spinal stenosis  M48.02   3. Cervical spondylosis  M47.812   4. Lumbar spondylosis  M47.816   5. Lumbar radiculopathy  M54.16   6. Sacroiliac joint dysfunction of right side  M53.3         ASSESSMENT and PLAN:     Shawn Westbrook  1955,   female      Shawn was seen today for follow-up.    Diagnoses and all orders for this visit:    Cervical radiculopathy  -     Pain Hospital Procedure; Future    Cervical spinal stenosis    Cervical spondylosis    Lumbar spondylosis    Lumbar radiculopathy  -     Pain Hospital Procedure; Future    Sacroiliac joint dysfunction of right side      Assessment & Plan  1. Cervical radiculopathy: Acute on chronic. Recurrence post cervical epidural steroid injection.  - C7-T1 cervical interlaminar epidural steroid injection  - The risks benefits and alternatives to this procedure were discussed and the patient wishes to proceed with the procedure. Risks include but are not limited to damage to surrounding structures, infection, bleeding, worsening of pain which can be permanent, weakness which can be permanent. Benefits include pain relief, improved function. Alternatives includes not doing the procedure.           2. Cervical spondylosis.  - Continue home exercise program  - Consider medial branch blocks if conservative treatments fail    3. Lumbar spondylosis: Stable. Improved post medial branch radiofrequency neurotomy on 04/08/2025. Improved ROM, negative facet loading maneuver.  - Continue home exercise program    4. Lumbar radiculopathy. Causing back and radiating pain. Recurrence noted.  - Bilateral lumbar L3-L4 transforaminal epidural steroid injections one month post neck injection  - The risks benefits and alternatives to this procedure were discussed and the patient wishes to proceed with the procedure. Risks include but are not limited to damage to surrounding structures, infection, bleeding, worsening of pain which can be permanent, weakness which can be permanent. Benefits include pain relief, improved function. Alternatives includes not doing the procedure.      5.  Medication management  -Discontinue meloxicam 5 days prior to procedures  - Resume meloxicam post-procedure    Follow-up  - Follow-up 6 weeks post injections for medication management    PROCEDURE  Bilateral medial branch radiofrequency neurotomy targeting lumbar L2-3 and L3-4 facet joints on 04/08/2025.      Thank you for allowing me to participate in the care of this patient. If you have any questions please not hesitate to contact me.             Please note that this dictation was created using voice recognition software. I have made every reasonable attempt to correct obvious errors but there may be errors of grammar and content that I may have overlooked prior to finalization of this note.      Marco Rios MD  Interventional Spine and Sports Physiatry  Physical Medicine and Rehabilitation  [facility]

## 2025-06-04 NOTE — Clinical Note
REFERRAL APPROVAL NOTICE         Sent on June 4, 2025                   Shawn Westbrook  [address] [place] [address]                   Dear Ms. Westbrook,    After a careful review of the medical information and benefit coverage, [facility] has processed your referral. See below for additional details.    If applicable, you must be actively enrolled with your insurance for coverage of the authorized service. If you have any questions regarding your coverage, please contact your insurance directly.    REFERRAL INFORMATION   Referral #:  [id number]  Referred-To Department    Referred-By Provider:  Pain Management    Marco Rios M.D.   Pain Management       [address]  [place] [address]  [facility] [id number] [phone] [facility] [id number] [phone]    Referral Start Date:  06/02/2025  Referral End Date:   09/04/2025             SCHEDULING  If you do not already have an appointment, please call [phone] to make an appointment.     MORE INFORMATION  If you do not already have a [facility] account, sign up at: [email or web address]  You can access your medical information, make appointments, see lab results, billing information, and more.  If you have questions regarding this referral, please contact  the [facility] Referrals department at:             [phone]. Monday - Friday 8:00AM - 5:00PM.     Sincerely,    [facility]

## 2025-06-05 ENCOUNTER — OFFICE VISIT (OUTPATIENT)
Dept: MEDICAL GROUP | Facility: PHYSICIAN GROUP | Age: 70
End: 2025-06-05
Payer: MEDICARE

## 2025-06-05 VITALS
SYSTOLIC BLOOD PRESSURE: 110 MMHG | RESPIRATION RATE: 16 BRPM | WEIGHT: 137.7 LBS | HEART RATE: 68 BPM | TEMPERATURE: 98.2 F | DIASTOLIC BLOOD PRESSURE: 66 MMHG | HEIGHT: 63 IN | BODY MASS INDEX: 24.4 KG/M2 | OXYGEN SATURATION: 98 %

## 2025-06-05 DIAGNOSIS — M54.50 CHRONIC LOW BACK PAIN, UNSPECIFIED BACK PAIN LATERALITY, UNSPECIFIED WHETHER SCIATICA PRESENT: Primary | ICD-10-CM

## 2025-06-05 DIAGNOSIS — M54.50 LUMBAR SPINE PAIN: ICD-10-CM

## 2025-06-05 DIAGNOSIS — E78.2 MIXED HYPERLIPIDEMIA: ICD-10-CM

## 2025-06-05 DIAGNOSIS — G89.29 CHRONIC LOW BACK PAIN, UNSPECIFIED BACK PAIN LATERALITY, UNSPECIFIED WHETHER SCIATICA PRESENT: Primary | ICD-10-CM

## 2025-06-05 DIAGNOSIS — E55.9 VITAMIN D DEFICIENCY: ICD-10-CM

## 2025-06-05 PROCEDURE — 3074F SYST BP LT 130 MM HG: CPT | Performed by: FAMILY MEDICINE

## 2025-06-05 PROCEDURE — 99214 OFFICE O/P EST MOD 30 MIN: CPT | Performed by: FAMILY MEDICINE

## 2025-06-05 PROCEDURE — 3078F DIAST BP <80 MM HG: CPT | Performed by: FAMILY MEDICINE

## 2025-06-05 RX ORDER — TRAMADOL HYDROCHLORIDE 50 MG/1
50 TABLET ORAL EVERY 8 HOURS PRN
Qty: 21 TABLET | Refills: 0 | Status: SHIPPED | OUTPATIENT
Start: 2025-06-05 | End: 2025-06-12

## 2025-06-05 RX ORDER — PREGABALIN 100 MG/1
CAPSULE ORAL
Qty: 270 CAPSULE | Refills: 1 | Status: SHIPPED | OUTPATIENT
Start: 2025-06-05 | End: 2025-09-05

## 2025-06-05 ASSESSMENT — FIBROSIS 4 INDEX: FIB4 SCORE: 1.67

## 2025-06-05 NOTE — PROGRESS NOTES
"Subjective:     CC:   Chief Complaint   Patient presents with    Follow-Up       HPI:   Shawn presents today patient needs refills on her Lyrica and tramadol.  Patient is utilizing 21 tablets of tramadol every 3 months.  Patient does see Dr. Rios for injections but Dr. Rios is referring patient to me for the Lyrica prescription and tramadol prescription.  Patient is taking the tramadol due to the fact she has low back pain patient is not wanting to get low back surgery.  Reviewing patient's chart patient has not had lab work done by me in almost a year we will need to do lab work.  Also patient will need to sign a controlled substance agreement would also like to get a urine done for drug screen.  Patient is occasionally having some leg cramping and is wondering if she should to take a potassium pill.  I informed patient I need to get lab work done on her to better address whether a potassium pill is needed.    Past Medical History[1]    Social History[2]    Current Medications and Prescriptions Ordered in Epic[3]    Allergies:  Patient has no known allergies.    Health Maintenance: Completed    ROS:  Gen: no fevers/chills, no changes in weight  Eyes: no changes in vision  ENT: no sore throat, no hearing loss, no bloody nose  Pulm: no sob, no cough  CV: no chest pain, no palpitations  GI: no nausea/vomiting, no diarrhea  : no dysuria  Neuro: no headaches, no numbness/tingling  Heme/Lymph: no easy bruising    Objective:     Exam:  /66 (BP Location: Left arm, Patient Position: Sitting, BP Cuff Size: Adult)   Pulse 68   Temp 36.8 °C (98.2 °F)   Resp 16   Ht 1.6 m (5' 3\")   Wt 62.5 kg (137 lb 11.2 oz)   SpO2 98%   BMI 24.39 kg/m²  Body mass index is 24.39 kg/m².    Gen: Alert and oriented, No apparent distress.  Skin: Warm and dry.  No obvious lesions.  Eyes: Sclera wnl Pupils normal in size  Lungs: Normal effort, CTA bilaterally, no wheezes, rhonchi, or rales  CV: Regular rate and rhythm. No murmurs, " rubs, or gallops.  Musculoskeletal: Normal gait. No extremity cyanosis, clubbing, or edema.  Neuro: Oriented to person, place and time  Psych: Mood is wnl       Assessment & Plan:     69 y.o. female with the following -     1. Chronic low back pain, unspecified back pain laterality, unspecified whether sciatica present  Patient did sign a controlled substance agreement had no questions.  I did want her to submit a urine patient unable to pee today will get it done another time.  - Controlled Substance Treatment Agreement  - Pain Management Screen; Future    2. Lumbar spine pain  Patient feels the Lyrica is helping so I will continue this at this time  - pregabalin (LYRICA) 100 MG Cap; 1 tablet p.o. in the morning and 2 tablets p.o. in the evening  Dispense: 270 Capsule; Refill: 1  - CBC WITH DIFFERENTIAL; Future    3. Mixed hyperlipidemia  Rechecking the lipid panel  - Comp Metabolic Panel; Future  - Lipid Profile; Future    4. Vitamin D deficiency  - VITAMIN D,25 HYDROXY (DEFICIENCY); Future       Return in about 3 months (around 2025), or if symptoms worsen or fail to improve.    Please note that this dictation was created using voice recognition software. I have made every reasonable attempt to correct obvious errors, but I expect that there are errors of grammar and possibly content that I did not discover before finalizing the note.       [1]   Past Medical History:  Diagnosis Date    Chronic low back pain     Hypertension     MVP (mitral valve prolapse)     Pericarditis    [2]   Social History  Tobacco Use    Smoking status: Former     Current packs/day: 0.00     Types: Cigarettes     Start date: 1980     Quit date: 1988     Years since quittin.0     Passive exposure: Past    Smokeless tobacco: Never   Vaping Use    Vaping status: Never Used   Substance Use Topics    Alcohol use: Yes     Alcohol/week: 0.0 oz     Comment: rare    Drug use: No   [3]   Current Outpatient Medications Ordered  in Epic   Medication Sig Dispense Refill    pregabalin (LYRICA) 100 MG Cap 1 tablet p.o. in the morning and 2 tablets p.o. in the evening 270 Capsule 1    traMADol (ULTRAM) 50 MG Tab Take 1 Tablet by mouth every 8 hours as needed for Severe Pain for up to 7 days. 21 Tablet 0    lidocaine (LIDODERM) 5 % Patch Place 1 Patch on the skin every 24 hours. Apply for 12 hours maximum and then take off for 12 hours. 30 Patch 5    DULoxetine (CYMBALTA) 30 MG Cap DR Particles Take 1 Capsule by mouth every day. 90 Capsule 1    omeprazole (PRILOSEC) 40 MG delayed-release capsule Take 1 Capsule by mouth every day. 90 Capsule 1    simvastatin (ZOCOR) 20 MG Tab TAKE 1 TABLET BY MOUTH EVERY EVENING 90 Tablet 1    meloxicam (MOBIC) 15 MG tablet TAKE 1 TABLET BY MOUTH EVERY DAY WITH FOOD. NO ADVIL/ALEVE/MOTRIN/IBUPROFEN ON THE SAME DAY. 90 Tablet 1    Melatonin 1 MG Cap Take  by mouth.      tretinoin (RETIN-A) 0.025 % cream APPLY PEA SIZED AMOUNT TOPICALLY TO FACE EVERY 3 NIGHTS. INCREASE TO EVERY NIGHT AS IRRITATION ALLOWS       No current Epic-ordered facility-administered medications on file.

## 2025-06-09 NOTE — Clinical Note
REFERRAL APPROVAL NOTICE         Sent on June 9, 2025                   Shawn Westbrook  [address] [place] [address]                   Dear Ms. Westbrook,    After a careful review of the medical information and benefit coverage, [facility] has processed your referral. See below for additional details.    If applicable, you must be actively enrolled with your insurance for coverage of the authorized service. If you have any questions regarding your coverage, please contact your insurance directly.    REFERRAL INFORMATION   Referral #:  [id number]  Referred-To Department    Referred-By Provider:  Pain Management    Marco Rios M.D.   Pain Management       [address]  [address]  [facility] [id number] [phone] [facility] [facility] [id number] [phone]    Referral Start Date:  06/02/2025  Referral End Date:   06/02/2026             SCHEDULING  If you do not already have an appointment, please call [phone] to make an appointment.     MORE INFORMATION  If you do not already have a [facility] account, sign up at: [email or web address]  You can access your medical information, make appointments, see lab results, billing information, and more.  If you have questions regarding this referral, please contact  the [facility] Referrals department at:             [phone]. Monday - Friday 8:00AM - 5:00PM.     Sincerely,    [facility]

## 2025-06-11 PROBLEM — M18.11 PRIMARY OSTEOARTHRITIS OF FIRST CARPOMETACARPAL JOINT OF RIGHT HAND: Status: ACTIVE | Noted: 2025-06-11

## 2025-06-11 PROBLEM — G56.03 BILATERAL CARPAL TUNNEL SYNDROME: Status: ACTIVE | Noted: 2025-06-11

## 2025-06-24 ENCOUNTER — HOSPITAL ENCOUNTER (OUTPATIENT)
Facility: REHABILITATION | Age: 70
End: 2025-06-24
Attending: PHYSICAL MEDICINE & REHABILITATION | Admitting: PHYSICAL MEDICINE & REHABILITATION
Payer: MEDICARE

## 2025-06-24 ENCOUNTER — APPOINTMENT (OUTPATIENT)
Dept: RADIOLOGY | Facility: REHABILITATION | Age: 70
End: 2025-06-24
Attending: PHYSICAL MEDICINE & REHABILITATION
Payer: MEDICARE

## 2025-06-24 VITALS
WEIGHT: 139.11 LBS | RESPIRATION RATE: 18 BRPM | BODY MASS INDEX: 24.65 KG/M2 | HEART RATE: 70 BPM | OXYGEN SATURATION: 96 % | SYSTOLIC BLOOD PRESSURE: 134 MMHG | DIASTOLIC BLOOD PRESSURE: 78 MMHG | HEIGHT: 63 IN | TEMPERATURE: 97.8 F

## 2025-06-24 PROCEDURE — 700111 HCHG RX REV CODE 636 W/ 250 OVERRIDE (IP): Mod: JZ

## 2025-06-24 PROCEDURE — 700117 HCHG RX CONTRAST REV CODE 255

## 2025-06-24 PROCEDURE — 62321 NJX INTERLAMINAR CRV/THRC: CPT

## 2025-06-24 PROCEDURE — 700101 HCHG RX REV CODE 250

## 2025-06-24 RX ORDER — LIDOCAINE HYDROCHLORIDE 10 MG/ML
INJECTION, SOLUTION EPIDURAL; INFILTRATION; INTRACAUDAL; PERINEURAL
Status: COMPLETED
Start: 2025-06-24 | End: 2025-06-24

## 2025-06-24 RX ORDER — SODIUM CHLORIDE 9 MG/ML
INJECTION, SOLUTION INTRAMUSCULAR; INTRAVENOUS; SUBCUTANEOUS
Status: COMPLETED
Start: 2025-06-24 | End: 2025-06-24

## 2025-06-24 RX ORDER — DEXAMETHASONE SODIUM PHOSPHATE 10 MG/ML
INJECTION, SOLUTION INTRAMUSCULAR; INTRAVENOUS
Status: COMPLETED
Start: 2025-06-24 | End: 2025-06-24

## 2025-06-24 RX ADMIN — IOHEXOL 5 ML: 240 INJECTION, SOLUTION INTRATHECAL; INTRAVASCULAR; INTRAVENOUS; ORAL at 11:34

## 2025-06-24 RX ADMIN — DEXAMETHASONE SODIUM PHOSPHATE 10 MG: 10 INJECTION, SOLUTION INTRAMUSCULAR; INTRAVENOUS at 11:34

## 2025-06-24 RX ADMIN — LIDOCAINE HYDROCHLORIDE 10 ML: 10 INJECTION, SOLUTION EPIDURAL; INFILTRATION; INTRACAUDAL; PERINEURAL at 11:33

## 2025-06-24 RX ADMIN — SODIUM CHLORIDE 10 ML: 9 INJECTION, SOLUTION INTRAMUSCULAR; INTRAVENOUS; SUBCUTANEOUS at 11:34

## 2025-06-24 ASSESSMENT — FIBROSIS 4 INDEX: FIB4 SCORE: 1.67

## 2025-06-24 NOTE — OP REPORT
Date of Service: 6/24/2025    Physician/s: Marco Rios MD    Pre-operative Diagnosis: Cervical radiculopathy    Post-operative Diagnosis: Cervical radiculopathy    Procedure: C7-T1  Cervical interlaminar epidural steroid injection    Description of procedure:    The risks, benefits, and alternatives of the procedure were reviewed and discussed with the patient.  Written informed consent was freely obtained. A pre-procedural time-out was conducted by the physician verifying patient’s identity, procedure to be performed, procedure site and side, and allergy verification. Appropriate equipment was determined to be in place for the procedure.         The patient's vital signs were carefully monitored before, throughout, and after the procedure.     In the fluoroscopy suite the patient was placed in a prone position, a pillow placed underneath their chest. The skin was prepped and draped in the usual sterile fashion. The fluoroscope was placed over the cervical neck at the appropriate injection AP angle view, and the target for injection was marked. A 25g needle was placed into the marked site, and approx 2mL of 1% Lidocaine was injected subcutaneously into the epidermal and dermal layers. The needle was removed. A 20g Tuohy needle was then placed and advanced under fluoroscopic guidance into the  C7-T1 interlaminar space at both the initial position AP view and contralateral oblique at a lateral view to ensure proper location of the needle tip at all times.  The needle was advanced with fluoroscopic guidance to the superior aspect of the T1 lamina.  Then a contralateral oblique view was used to advance the needle slightly towards the epidural space, A loss-of-resistance technique was used to guide the needle into the epidural space in a lateral fluoroscopic view and confirmed with loss of resistance with sterile normal saline. contrast dye was used to highlight the epidural space spread while the fluoroscope was  running live. Following negative aspiration, 1mL of 10mg/mL of dexamethasone followed by 2 mL of sterile saline . The needle was removed intact after restyleted. The patient's back was covered with a 4x4 gauze, the area was cleansed with sterile normal saline, and a dressing was applied. There were no complications noted.     The patient was then evaluated post-procedure, and was hemodynamically stable prior to leaving the post-operative care unit.       The patient had 70 percent pain relief postprocedure  Preprocedure pain 8/10 NRS  Postprocedure pain 3 /10 NRS    Follow-up as scheduled    Marco Rios MD  Interventional Spine and Pain  Physical Medicine and Rehabilitation  [facility]        CPT  interlaminar cervical/thoracic epidural: 21649

## 2025-06-24 NOTE — OR SURGEON
Immediate Post OP Note    Pre-Op Diagnosis Codes:      * Cervical radiculopathy [M54.12]      Post-Op Diagnosis Codes:     * Cervical radiculopathy [M54.12]      Procedure(s):  cervical C7-T1 interlaminar epidural steroid injection      - Wound Class: Clean    Surgeon(s):  Marco Rios M.D.    Anesthesiologist/Type of Anesthesia:  No anesthesia staff entered./Local    Surgical Staff:  Circulator: Sri Alfaro R.N.  Scrub Person: Cecy Rod  Radiology Technologist: Shelly Baptiste    Specimens removed if any:  * No specimens in log *    Estimated Blood Loss: None    Findings: None    Complications: None        6/24/2025 11:53 AM Marco Rios M.D.

## 2025-06-24 NOTE — INTERVAL H&P NOTE
Consented Procedure: cervical C7-T1 interlaminar epidural steroid injection    I have examined the patient, provided the risks, benefits, and alternatives to the procedure(s) indicated on the signed consent form, and the patient wishes to proceed.    H&P reviewed. The patient was examined and there are no changes to the H&P      Marco Rios M.D.  06/24/25 11:03 AM

## 2025-06-25 ENCOUNTER — TELEPHONE (OUTPATIENT)
Dept: PHYSICAL MEDICINE AND REHAB | Facility: MEDICAL CENTER | Age: 70
End: 2025-06-25
Payer: MEDICARE

## 2025-06-25 NOTE — TELEPHONE ENCOUNTER
Called for post-sp check-up. Pt reported the following regarding the procedure site: cervical C7-T1 interlaminar epidural steroid injection     Change in pain?: LVM    Concerns?:     Confirmed [facility] appt?:

## 2025-07-22 ENCOUNTER — APPOINTMENT (OUTPATIENT)
Dept: RADIOLOGY | Facility: REHABILITATION | Age: 70
End: 2025-07-22
Attending: PHYSICAL MEDICINE & REHABILITATION
Payer: MEDICARE

## 2025-07-22 ENCOUNTER — HOSPITAL ENCOUNTER (OUTPATIENT)
Facility: REHABILITATION | Age: 70
End: 2025-07-22
Attending: PHYSICAL MEDICINE & REHABILITATION | Admitting: PHYSICAL MEDICINE & REHABILITATION
Payer: MEDICARE

## 2025-07-22 VITALS
BODY MASS INDEX: 25.43 KG/M2 | DIASTOLIC BLOOD PRESSURE: 72 MMHG | TEMPERATURE: 98.6 F | RESPIRATION RATE: 16 BRPM | SYSTOLIC BLOOD PRESSURE: 120 MMHG | WEIGHT: 143.52 LBS | OXYGEN SATURATION: 96 % | HEIGHT: 63 IN | HEART RATE: 69 BPM

## 2025-07-22 PROCEDURE — 700117 HCHG RX CONTRAST REV CODE 255

## 2025-07-22 PROCEDURE — 64483 NJX AA&/STRD TFRM EPI L/S 1: CPT

## 2025-07-22 PROCEDURE — 700111 HCHG RX REV CODE 636 W/ 250 OVERRIDE (IP): Mod: JZ

## 2025-07-22 RX ORDER — LIDOCAINE HYDROCHLORIDE 10 MG/ML
INJECTION, SOLUTION EPIDURAL; INFILTRATION; INTRACAUDAL; PERINEURAL
Status: COMPLETED
Start: 2025-07-22 | End: 2025-07-22

## 2025-07-22 RX ORDER — ROPIVACAINE HYDROCHLORIDE 5 MG/ML
INJECTION, SOLUTION EPIDURAL; INFILTRATION; PERINEURAL
Status: COMPLETED
Start: 2025-07-22 | End: 2025-07-22

## 2025-07-22 RX ORDER — MIDAZOLAM HYDROCHLORIDE 1 MG/ML
INJECTION INTRAMUSCULAR; INTRAVENOUS
Status: COMPLETED
Start: 2025-07-22 | End: 2025-07-22

## 2025-07-22 RX ORDER — DEXAMETHASONE SODIUM PHOSPHATE 10 MG/ML
INJECTION, SOLUTION INTRAMUSCULAR; INTRAVENOUS
Status: COMPLETED
Start: 2025-07-22 | End: 2025-07-22

## 2025-07-22 RX ADMIN — LIDOCAINE HYDROCHLORIDE 10 ML: 10 INJECTION, SOLUTION EPIDURAL; INFILTRATION; INTRACAUDAL; PERINEURAL at 11:34

## 2025-07-22 RX ADMIN — DEXAMETHASONE SODIUM PHOSPHATE 10 MG: 10 INJECTION, SOLUTION INTRAMUSCULAR; INTRAVENOUS at 11:32

## 2025-07-22 RX ADMIN — IOHEXOL 10 ML: 240 INJECTION, SOLUTION INTRATHECAL; INTRAVASCULAR; INTRAVENOUS; ORAL at 11:32

## 2025-07-22 ASSESSMENT — FIBROSIS 4 INDEX: FIB4 SCORE: 1.67

## 2025-07-22 ASSESSMENT — PAIN DESCRIPTION - PAIN TYPE: TYPE: CHRONIC PAIN

## 2025-07-22 NOTE — OP REPORT
Date of Service: 7/22/2025     Patient: Shawn Westrbook 69 y.o. female     MRN: [id number]     Physician/s: Marco Rios MD    Pre-operative Diagnosis: Lumbar radiculopathy    Post-operative Diagnosis: Lumbar radiculopathy    Procedure: bilateral  Lumbar Transforaminal Epidural Steroid  at the L3-4 levels.     Description of procedure:    The risks, benefits, and alternatives of the procedure were reviewed and discussed with the patient.  Written informed consent was freely obtained. A pre-procedural time-out was conducted by the physician verifying patient’s identity, procedure to be performed, procedure site and side, and allergy verification. Appropriate equipment was determined to be in place for the procedure.         The patient's vital signs were carefully monitored before, throughout, and after the procedure.     In the fluoroscopy suite the patient was placed in a prone position, a pillow placed underneath their umbilicus. The skin was prepped and draped in the usual sterile fashion.     The fluoroscope was placed over the lumbar spine and adjusted into the proper AP/Oblique view to enter the transforaminal space just adjacent to the pedicle at the levels below. The targets for injection were then marked at the left L3-4. A 27g 1.5 inch needle was placed into the marked site, and 1mL of 1% Lidocaine was injected subcutaneously into the epidermal and dermal layers. The needle was removed intact.  A 22g 5 inch spinal needle was then placed and advanced under fluoroscopic guidance in an oblique view towards the epidural space of the levels noted above. The needle position was confirmed to not be past the 6 o'clock position in the AP view and it was in the neuroforamen in the lateral view.        The fluoroscope was was then adjusted over the lumbar spine and adjusted into the proper AP/Oblique view to enter the transforaminal space adjacent to the pedicle at the RIGHT  L3-4. A 27g 1.5 inch needle was placed  into the marked site, and 1mL of 1% Lidocaine was injected subcutaneously into the epidermal and dermal layers. The needle was removed intact.  A 22g 5 inch spinal needle was then placed and advanced under fluoroscopic guidance in an oblique view towards the epidural space of the levels noted above. The needle position was confirmed to not be past the 6 o'clock position in the AP view and it was in the neuroforamen in the lateral view.       Under live fluoroscopic guidance in the AP view, contrast dye was used to highlight the epidural space spread of each level above. Final fluoroscopic images were saved.  Following negative aspiration, 1mL of 1% lidocaine preservative free with 5mg dexamethasone   was then injected at each level above, and the needles were removed intact after restyleted. The patient's back was covered with a 4x4 gauze, the area was cleansed with sterile normal saline, and a dressing was applied. There were no complications noted.     The patient was then evaluated post-procedure, and was hemodynamically stable prior to leaving the post-operative care unit.     The patient had 50% pain relief postprocedure  Preprocedural pain: 8/10 NRS  Postprocedural pain: 4/10 NRS    Follow-up as scheduled    Marco Rios MD  Interventional Spine and Pain  Physical Medicine and Rehabilitation  [facility]          CPT codes  Transforaminal epidural injection- lumbar or sacral (first level):  46160-99

## 2025-07-22 NOTE — H&P
Physical medicine and rehabilitation preprocedure history & Physical Note    Date  2025    Primary Care Physician  Princess Mena M.D.    CC  Pre-Op Diagnosis Codes:      * Lumbar radiculopathy [M54.16]     HPI  This is a 69 y.o. female who presented with low back pain rating down the leg    See previous notes of Dr. Rios    Past Medical History[1]    Past Surgical History[2]    Current Medications[3]    Social History     Socioeconomic History    Marital status:      Spouse name: Not on file    Number of children: Not on file    Years of education: Not on file    Highest education level: Not on file   Occupational History     Employer: OTHER     Comment: retired   Tobacco Use    Smoking status: Former     Current packs/day: 0.00     Types: Cigarettes     Start date: 1980     Quit date: 1988     Years since quittin.1     Passive exposure: Past    Smokeless tobacco: Never   Vaping Use    Vaping status: Never Used   Substance and Sexual Activity    Alcohol use: Yes     Alcohol/week: 0.0 oz     Comment: rare    Drug use: No    Sexual activity: Not Currently     Comment:    Other Topics Concern     Service No    Blood Transfusions No    Caffeine Concern No    Occupational Exposure No    Hobby Hazards No    Sleep Concern Yes     Comment: due to pain    Stress Concern Yes    Weight Concern No    Special Diet No    Back Care No    Exercise Yes    Bike Helmet No    Seat Belt Yes    Self-Exams Yes   Social History Narrative    Not on file     Social Drivers of Health     Financial Resource Strain: Not on file   Food Insecurity: Not on file   Transportation Needs: Not on file   Physical Activity: Not on file   Stress: Not on file   Social Connections: Not on file   Intimate Partner Violence: Not on file   Housing Stability: Not on file       Family History   Problem Relation Age of Onset    Heart Disease Mother     Heart Disease Father     Cancer Father     Alcohol abuse Father      Diabetes Sister     Diabetes Sister     Breast Cancer Maternal Grandmother     Dementia Paternal Grandmother        Allergies  Tape    Review of Systems  Comprehensive review of systems was negative       Physical Exam  Constitutional:       General: She is not in acute distress.     Appearance: Normal appearance. She is not ill-appearing, toxic-appearing or diaphoretic.   Cardiovascular:      Rate and Rhythm: Normal rate and regular rhythm.      Pulses: Normal pulses.      Heart sounds: Normal heart sounds.   Pulmonary:      Effort: Pulmonary effort is normal.      Breath sounds: Normal breath sounds.   Abdominal:      General: Abdomen is flat. Bowel sounds are normal. There is no distension.      Palpations: Abdomen is soft.      Tenderness: There is no abdominal tenderness. There is no guarding or rebound.   Skin:     General: Skin is warm and dry.      Capillary Refill: Capillary refill takes less than 2 seconds.   Neurological:      Mental Status: She is alert.          Vital Signs  Blood Pressure : 121/70   Temperature: 37 °C (98.6 °F)   Pulse: 65   Respiration: 16   Pulse Oximetry: 96 %     Vitals reviewed    Labs:                    Radiology:  DX-PORTABLE FLUOROSCOPY < 1 HOUR Reason For Exam: pain    (Results Pending)         Assessment/Plan:  Pre-Op Diagnosis Codes:      * Lumbar radiculopathy [M54.16]  Procedure(s):  BILATERAL L3-4 transforaminal epidural steroid injection with fluoroscopic guidance           [1]   Past Medical History:  Diagnosis Date    Chronic low back pain     Hypertension     MVP (mitral valve prolapse)     Pericarditis 2010   [2]   Past Surgical History:  Procedure Laterality Date    [place] INJ CERV/THORAC,W/ IMAGING N/A 6/24/2025    Procedure: cervical C7-T1 interlaminar epidural steroid injection ….Note: stop meloxicam 5 days prior to the procedure. Plan on cervical injection approximately 5 days prior to lumbar.;  Surgeon: Marco Rios M.D.;  [facility];  Service: Pain Management    RADIO FREQUENCY ABLATION ADDITIONAL LEVEL Bilateral 4/8/2025    Procedure: RIGHT and LEFT  radiofrequency neurotomies medial branch targeting the L2-3 and L3-4 facet joints with fluoroscopic guidance and sedation…Note: plan for sedation with 0.5mg versed and 25 mcg fentanyl. Plan for 80 °C for 90 seconds for each neurotomy;  Surgeon: Marco Rios M.D.;  [facility];  Service: Pain Management    CO NEUROPLASTY & OR TRANSPOS MEDIAN NRV CARPAL ALBERTO Left 3/21/2025    Procedure: LEFT HAND OPEN CARPAL TUNNEL RELEASE;  Surgeon: Tony Young M.D.;  Location: [facility];  Service: Orthopedics    LUMBAR MEDIAL BRANCH BLOCKS Bilateral 2/11/2025    Procedure: Diagnostic medial branch blocks targeting the BILATERAL L2-3 and L3-4 facet joints with fluoroscopic guidance #2…..Note: Diagnostic medial branch block #2 is only be done if procedure #1 is a positive block.  This will be on a separate date from procedure #1.;  Surgeon: Marco Rios M.D.;  Location: [facility];  Service: Pain Management    LUMBAR MEDIAL BRANCH BLOCKS Bilateral 1/28/2025    Procedure: Diagnostic medial branch blocks targeting the BILATERAL L2-3 and L3-4 facet joints with fluoroscopic guidance #1;  Surgeon: Marco Rios M.D.;  Location: [facility];  Service: Pain Management    CO NJX AA&/STRD TFRML EPI LUMBAR/SACRAL 1 LEVEL Bilateral 10/2/2024    Procedure: BILATERAL L3-4 transforaminal epidural steroid injection with fluoroscopic guidance;  Surgeon: Marco Rios M.D.;  [facility]: [facility];  Service: Pain Management    CO INJECTION,SACROILIAC JOINT Bilateral 5/14/2024    Procedure: RIGHT and Left sacroiliac joint injection with fluoroscopic guidance;  Surgeon: Marco Rios M.D.;  Location: [facility];  Service: Pain Management    CO NJX AA&/STRD TFRML EPI LUMBAR/SACRAL 1  LEVEL Bilateral 2/13/2024    Procedure: BILATERAL L5-S1 versus L4-5 transforaminal epidural steroid injection with fluoroscopic guidance.    Note: cervical injection approximately 1 month prior to the lumbar;  Surgeon: Marco Rios M.D.;  [facility]: [facility];  Service: Pain Management    [place] INJ CERV/THORAC,W/ IMAGING Left 1/16/2024    Procedure: LEFT cervical C7-T1 interlaminar epidural steroid injection.    Note: cervical approximately 1 month prior to the lumbar;  Surgeon: Marco Rios M.D.;  [facility];  Service: Pain Management    FL INJ CERV/THORAC,W/ IMAGING N/A 1/10/2023    Procedure: C7-T1 interlaminar epidural steroid injection;  Surgeon: Marco Rios M.D.;  [facility]: [facility];  Service: Pain Management    FL INJ CERV/THORAC,W/ IMAGING N/A 9/27/2022    Procedure: C7-T1 interlaminar epidural steroid injection;  Surgeon: Marco Rios M.D.;  Location: [facility];  Service: Pain Management    ABDOMINAL HYSTERECTOMY TOTAL      cervix remains and one ovary.     ARTHROPLASTY Bilateral     Total knee replacements    LAMINOTOMY      L4-5, L5-S1 fusion    OTHER ORTHOPEDIC SURGERY      spinal fusion    OTHER ORTHOPEDIC SURGERY      right knee replacement   [3]   Current Facility-Administered Medications   Medication Dose Route Frequency Provider Last Rate Last Admin    FENTANYL CITRATE (PF) 0.05 MG/ML INJ SOLN (WRAPPED)             MIDAZOLAM HCL 1 MG/ML INJ SOLN (WRAPPER)             ROPIVACAINE HCL 5 MG/ML INJ SOLN             LIDOCAINE HCL (PF) 1 % INJ SOLN

## 2025-07-22 NOTE — OR SURGEON
Immediate Post OP Note    Pre-Op Diagnosis Codes:      * Lumbar radiculopathy [M54.16]      Post-Op Diagnosis Codes:     * Lumbar radiculopathy [M54.16]      Procedure(s):  BILATERAL L3-4 transforaminal epidural steroid injection with fluoroscopic guidance    . - Wound Class: Clean    Surgeon(s):  Marco Rios M.D.    Anesthesiologist/Type of Anesthesia:  No anesthesia staff entered./Local    Surgical Staff:  Circulator: Patricia Condon R.N.  Scrub Person: Ryann Amato R.N.  Radiology Technologist: Hussain Mattson    Specimens removed if any:  * No specimens in log *    Estimated Blood Loss: None    Findings: None    Complications: None        7/22/2025 11:50 AM Marco Rios M.D.

## 2025-07-23 ENCOUNTER — TELEPHONE (OUTPATIENT)
Dept: PHYSICAL MEDICINE AND REHAB | Facility: MEDICAL CENTER | Age: 70
End: 2025-07-23
Payer: MEDICARE

## 2025-07-23 NOTE — TELEPHONE ENCOUNTER
Called for post-sp check-up. Pt reported the following regarding the procedure site: BILATERAL L3-4 transforaminal epidural steroid injection with fluoroscopic guidance     Change in pain?: Yes    Concerns?: No    Confirmed [facility] appt?: Yes

## 2025-08-06 ENCOUNTER — HOSPITAL ENCOUNTER (OUTPATIENT)
Dept: LAB | Facility: MEDICAL CENTER | Age: 70
End: 2025-08-06
Attending: FAMILY MEDICINE
Payer: MEDICARE

## 2025-08-06 DIAGNOSIS — M54.50 CHRONIC LOW BACK PAIN, UNSPECIFIED BACK PAIN LATERALITY, UNSPECIFIED WHETHER SCIATICA PRESENT: ICD-10-CM

## 2025-08-06 DIAGNOSIS — M54.50 LUMBAR SPINE PAIN: ICD-10-CM

## 2025-08-06 DIAGNOSIS — E55.9 VITAMIN D DEFICIENCY: ICD-10-CM

## 2025-08-06 DIAGNOSIS — E78.2 MIXED HYPERLIPIDEMIA: ICD-10-CM

## 2025-08-06 DIAGNOSIS — G89.29 CHRONIC LOW BACK PAIN, UNSPECIFIED BACK PAIN LATERALITY, UNSPECIFIED WHETHER SCIATICA PRESENT: ICD-10-CM

## 2025-08-06 LAB
25(OH)D3 SERPL-MCNC: 45 NG/ML (ref 30–100)
ALBUMIN SERPL BCP-MCNC: 3.8 G/DL (ref 3.2–4.9)
ALBUMIN/GLOB SERPL: 1.5 G/DL
ALP SERPL-CCNC: 73 U/L (ref 30–99)
ALT SERPL-CCNC: 42 U/L (ref 2–50)
ANION GAP SERPL CALC-SCNC: 7 MMOL/L (ref 7–16)
AST SERPL-CCNC: 35 U/L (ref 12–45)
BASOPHILS # BLD AUTO: 0.7 % (ref 0–1.8)
BASOPHILS # BLD: 0.05 K/UL (ref 0–0.12)
BILIRUB SERPL-MCNC: 0.3 MG/DL (ref 0.1–1.5)
BUN SERPL-MCNC: 23 MG/DL (ref 8–22)
CALCIUM ALBUM COR SERPL-MCNC: 9.6 MG/DL (ref 8.5–10.5)
CALCIUM SERPL-MCNC: 9.4 MG/DL (ref 8.5–10.5)
CHLORIDE SERPL-SCNC: 105 MMOL/L (ref 96–112)
CHOLEST SERPL-MCNC: 187 MG/DL (ref 100–199)
CO2 SERPL-SCNC: 28 MMOL/L (ref 20–33)
CREAT SERPL-MCNC: 0.72 MG/DL (ref 0.5–1.4)
EOSINOPHIL # BLD AUTO: 0.05 K/UL (ref 0–0.51)
EOSINOPHIL NFR BLD: 0.7 % (ref 0–6.9)
ERYTHROCYTE [DISTWIDTH] IN BLOOD BY AUTOMATED COUNT: 50.6 FL (ref 35.9–50)
GFR SERPLBLD CREATININE-BSD FMLA CKD-EPI: 90 ML/MIN/1.73 M 2
GLOBULIN SER CALC-MCNC: 2.6 G/DL (ref 1.9–3.5)
GLUCOSE SERPL-MCNC: 83 MG/DL (ref 65–99)
HCT VFR BLD AUTO: 41.2 % (ref 37–47)
HDLC SERPL-MCNC: 79 MG/DL
HGB BLD-MCNC: 13.2 G/DL (ref 12–16)
IMM GRANULOCYTES # BLD AUTO: 0.03 K/UL (ref 0–0.11)
IMM GRANULOCYTES NFR BLD AUTO: 0.4 % (ref 0–0.9)
LDLC SERPL CALC-MCNC: 91 MG/DL
LYMPHOCYTES # BLD AUTO: 2.18 K/UL (ref 1–4.8)
LYMPHOCYTES NFR BLD: 28.5 % (ref 22–41)
MCH RBC QN AUTO: 30.6 PG (ref 27–33)
MCHC RBC AUTO-ENTMCNC: 32 G/DL (ref 32.2–35.5)
MCV RBC AUTO: 95.6 FL (ref 81.4–97.8)
MONOCYTES # BLD AUTO: 0.63 K/UL (ref 0–0.85)
MONOCYTES NFR BLD AUTO: 8.2 % (ref 0–13.4)
NEUTROPHILS # BLD AUTO: 4.71 K/UL (ref 1.82–7.42)
NEUTROPHILS NFR BLD: 61.5 % (ref 44–72)
NRBC # BLD AUTO: 0 K/UL
NRBC BLD-RTO: 0 /100 WBC (ref 0–0.2)
PLATELET # BLD AUTO: 250 K/UL (ref 164–446)
PMV BLD AUTO: 12.6 FL (ref 9–12.9)
POTASSIUM SERPL-SCNC: 5 MMOL/L (ref 3.6–5.5)
PROT SERPL-MCNC: 6.4 G/DL (ref 6–8.2)
RBC # BLD AUTO: 4.31 M/UL (ref 4.2–5.4)
SODIUM SERPL-SCNC: 140 MMOL/L (ref 135–145)
TRIGL SERPL-MCNC: 84 MG/DL (ref 0–149)
WBC # BLD AUTO: 7.7 K/UL (ref 4.8–10.8)

## 2025-08-06 PROCEDURE — 82306 VITAMIN D 25 HYDROXY: CPT

## 2025-08-06 PROCEDURE — 85025 COMPLETE CBC W/AUTO DIFF WBC: CPT

## 2025-08-06 PROCEDURE — G0482 DRUG TEST DEF 15-21 CLASSES: HCPCS

## 2025-08-06 PROCEDURE — 36415 COLL VENOUS BLD VENIPUNCTURE: CPT

## 2025-08-06 PROCEDURE — 80053 COMPREHEN METABOLIC PANEL: CPT

## 2025-08-06 PROCEDURE — 80061 LIPID PANEL: CPT

## 2025-08-11 PROBLEM — G56.03 CARPAL TUNNEL SYNDROME ON BOTH SIDES: Status: ACTIVE | Noted: 2025-08-11

## 2025-08-11 PROBLEM — M79.641 RIGHT HAND PAIN: Status: ACTIVE | Noted: 2025-08-11

## 2025-08-11 LAB
1OH-MIDAZOLAM UR QL SCN: NOT DETECTED
6MAM UR QL: NOT DETECTED
7AMINOCLONAZEPAM UR QL: NOT DETECTED
A-OH ALPRAZ UR QL: NOT DETECTED
ALPRAZ UR QL: NOT DETECTED
AMPHET UR QL SCN: NOT DETECTED
ANNOTATION COMMENT IMP: ABNORMAL
BARBITURATES UR QL: NEGATIVE
BUPRENORPHINE UR QL: NOT DETECTED
BZE UR QL: NEGATIVE
CARBOXYTHC UR QL: NEGATIVE
CARISOPRODOL UR QL: NEGATIVE
CLONAZEPAM UR QL: NOT DETECTED
CODEINE UR QL: NOT DETECTED
CREAT UR-MCNC: 79.7 MG/DL (ref 20–400)
DIAZEPAM UR QL: NOT DETECTED
ETHYL GLUCURONIDE UR QL: NEGATIVE
FENTANYL UR QL: NOT DETECTED
GABAPENTIN UR QL CFM: NOT DETECTED
HYDROCODONE UR QL: NOT DETECTED
HYDROMORPHONE UR QL: NOT DETECTED
LORAZEPAM UR QL: NOT DETECTED
MDA UR QL: NOT DETECTED
MDEA UR QL: NOT DETECTED
MDMA UR QL: NOT DETECTED
ME-PHENIDATE UR QL: NOT DETECTED
METHADONE UR QL: NEGATIVE
METHAMPHET UR QL: NOT DETECTED
MIDAZOLAM UR QL SCN: NOT DETECTED
MORPHINE UR QL: NOT DETECTED
NALOXONE UR QL CFM: NOT DETECTED
NORBUPRENORPHINE UR QL CFM: NOT DETECTED
NORDIAZEPAM UR QL: NOT DETECTED
NORFENTANYL UR QL: NOT DETECTED
NORHYDROCODONE UR QL CFM: NOT DETECTED
NORMEPERIDINE UR QL CFM: NOT DETECTED
NOROXYCODONE UR QL CFM: NOT DETECTED
NOROXYMORPHONE UR QL SCN: NOT DETECTED
OXAZEPAM UR QL: NOT DETECTED
OXYCODONE UR QL: NOT DETECTED
OXYMORPHONE UR QL: NOT DETECTED
PATHOLOGY STUDY: ABNORMAL
PCP UR QL: NEGATIVE
PHENTERMINE UR QL: NOT DETECTED
PREGABALIN UR QL CFM: PRESENT
SERVICE CMNT-IMP: ABNORMAL
TAPENTADOL UR QL SCN: NOT DETECTED
TAPENTADOL UR QL SCN: NOT DETECTED
TEMAZEPAM UR QL: NOT DETECTED
TRAMADOL UR QL: NEGATIVE
ZOLPIDEM PHENYL-4-CARB UR QL SCN: NOT DETECTED
ZOLPIDEM UR QL: NOT DETECTED

## 2025-08-12 RX ORDER — DULOXETIN HYDROCHLORIDE 30 MG/1
30 CAPSULE, DELAYED RELEASE ORAL DAILY
Qty: 90 CAPSULE | Refills: 1 | Status: SHIPPED | OUTPATIENT
Start: 2025-08-12

## 2025-08-28 DIAGNOSIS — E78.2 MIXED HYPERLIPIDEMIA: ICD-10-CM

## 2025-08-28 RX ORDER — SIMVASTATIN 20 MG
20 TABLET ORAL EVERY EVENING
Qty: 90 TABLET | Refills: 1 | Status: SHIPPED | OUTPATIENT
Start: 2025-08-28

## 2025-08-29 ENCOUNTER — OFFICE VISIT (OUTPATIENT)
Dept: MEDICAL GROUP | Facility: PHYSICIAN GROUP | Age: 70
End: 2025-08-29
Payer: MEDICARE

## 2025-08-29 VITALS
OXYGEN SATURATION: 97 % | TEMPERATURE: 98.5 F | DIASTOLIC BLOOD PRESSURE: 64 MMHG | WEIGHT: 148.2 LBS | HEART RATE: 74 BPM | BODY MASS INDEX: 26.26 KG/M2 | HEIGHT: 63 IN | SYSTOLIC BLOOD PRESSURE: 106 MMHG | RESPIRATION RATE: 16 BRPM

## 2025-08-29 DIAGNOSIS — E78.2 MIXED HYPERLIPIDEMIA: ICD-10-CM

## 2025-08-29 DIAGNOSIS — Z12.31 ENCOUNTER FOR SCREENING MAMMOGRAM FOR MALIGNANT NEOPLASM OF BREAST: Primary | ICD-10-CM

## 2025-08-29 DIAGNOSIS — F32.A ANXIETY AND DEPRESSION: ICD-10-CM

## 2025-08-29 DIAGNOSIS — F41.9 ANXIETY AND DEPRESSION: ICD-10-CM

## 2025-08-29 ASSESSMENT — FIBROSIS 4 INDEX: FIB4 SCORE: 1.51

## 2025-09-08 ENCOUNTER — APPOINTMENT (OUTPATIENT)
Dept: PHYSICAL MEDICINE AND REHAB | Facility: MEDICAL CENTER | Age: 70
End: 2025-09-08
Payer: MEDICARE